# Patient Record
Sex: FEMALE | Race: WHITE | Employment: OTHER | URBAN - METROPOLITAN AREA
[De-identification: names, ages, dates, MRNs, and addresses within clinical notes are randomized per-mention and may not be internally consistent; named-entity substitution may affect disease eponyms.]

---

## 2019-09-29 ENCOUNTER — HOSPITAL ENCOUNTER (EMERGENCY)
Age: 79
Discharge: HOME | End: 2019-09-29
Attending: EMERGENCY MEDICINE
Payer: MEDICARE

## 2019-09-29 ENCOUNTER — APPOINTMENT (OUTPATIENT)
Dept: CT IMAGING | Age: 79
End: 2019-09-29
Attending: EMERGENCY MEDICINE
Payer: MEDICARE

## 2019-09-29 VITALS
HEIGHT: 66 IN | TEMPERATURE: 98.2 F | HEART RATE: 76 BPM | SYSTOLIC BLOOD PRESSURE: 144 MMHG | WEIGHT: 136 LBS | BODY MASS INDEX: 21.86 KG/M2 | DIASTOLIC BLOOD PRESSURE: 85 MMHG | OXYGEN SATURATION: 100 % | RESPIRATION RATE: 14 BRPM

## 2019-09-29 DIAGNOSIS — S00.83XA CONTUSION OF FACE, INITIAL ENCOUNTER: Primary | ICD-10-CM
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
## 2019-09-29 LAB
ATRIAL RATE: 277 BPM
CALCULATED R AXIS, ECG10: -6 DEGREES
CALCULATED T AXIS, ECG11: 33 DEGREES
DIAGNOSIS, 93000: NORMAL
Q-T INTERVAL, ECG07: 394 MS
QRS DURATION, ECG06: 76 MS
QTC CALCULATION (BEZET), ECG08: 460 MS
VENTRICULAR RATE, ECG03: 82 BPM

## 2019-09-29 PROCEDURE — 99283 EMERGENCY DEPT VISIT LOW MDM: CPT

## 2019-09-29 PROCEDURE — 93005 ELECTROCARDIOGRAM TRACING: CPT

## 2019-09-29 PROCEDURE — 70450 CT HEAD/BRAIN W/O DYE: CPT

## 2019-09-29 RX ORDER — FOLIC ACID 1 MG/1
TABLET ORAL DAILY
Status: ON HOLD | COMMUNITY
End: 2022-01-01 | Stop reason: CLARIF

## 2019-09-29 RX ORDER — FUROSEMIDE 20 MG/1
20 TABLET ORAL DAILY
Status: ON HOLD | COMMUNITY
End: 2022-01-01 | Stop reason: SDUPTHER

## 2019-09-29 RX ORDER — CLONAZEPAM 0.5 MG/1
0.5 TABLET ORAL
COMMUNITY

## 2019-09-29 RX ORDER — CLOPIDOGREL BISULFATE 75 MG/1
75 TABLET ORAL DAILY
COMMUNITY

## 2019-09-29 RX ORDER — ATORVASTATIN CALCIUM 80 MG/1
80 TABLET, FILM COATED ORAL
COMMUNITY

## 2019-09-29 RX ORDER — B-COMPLEX WITH VITAMIN C
1 TABLET ORAL DAILY
COMMUNITY
End: 2022-01-01

## 2019-09-29 RX ORDER — ESCITALOPRAM OXALATE 5 MG/1
15 TABLET ORAL DAILY
COMMUNITY

## 2019-09-29 RX ORDER — PANTOPRAZOLE SODIUM 40 MG/1
40 TABLET, DELAYED RELEASE ORAL DAILY
COMMUNITY

## 2019-09-29 NOTE — ED PROVIDER NOTES
53-year-old female history of atrial fibrillation and hypertension presents for evaluation of facial swelling post fall. Patient is here from [place] visiting her son. He heard a fall at around 0500 hrs. this morning. Found her sitting propped up beside the bed. She has bruising and whelped over the right side of the face adjacent to the right eye. No visual complaints at this time. Primary cause for concern is that the patient has chronic atrial fibrillation and is on anticoagulation for same. Past Medical History:   Diagnosis Date    A-fib [facility])     CAD (coronary artery disease)     Heart Failure    Hypertension     Osteoporosis        Past Surgical History:   Procedure Laterality Date    HX BACK SURGERY           History reviewed. No pertinent family history.     Social History     Socioeconomic History    Marital status:      Spouse name: Not on file    Number of children: Not on file    Years of education: Not on file    Highest education level: Not on file   Occupational History    Not on file   Social Needs    Financial resource strain: Not on file    Food insecurity:     Worry: Not on file     Inability: Not on file    Transportation needs:     Medical: Not on file     Non-medical: Not on file   Tobacco Use    Smoking status: Former Smoker    Smokeless tobacco: Never Used   Substance and Sexual Activity    Alcohol use: No    Drug use: No    Sexual activity: Not on file   Lifestyle    Physical activity:     Days per week: Not on file     Minutes per session: Not on file    Stress: Not on file   Relationships    Social connections:     Talks on phone: Not on file     Gets together: Not on file     Attends Buddhism service: Not on file     Active member of club or organization: Not on file     Attends meetings of clubs or organizations: Not on file     Relationship status: Not on file    Intimate partner violence:     Fear of current or ex partner: Not on file Emotionally abused: Not on file     Physically abused: Not on file     Forced sexual activity: Not on file   Other Topics Concern    Not on file   Social History Narrative    Not on file         ALLERGIES: Patient has no known allergies. Review of Systems   Musculoskeletal: Negative for neck pain. Neurological: Positive for headaches. Negative for syncope. All other systems reviewed and are negative. Vitals:    09/29/19 0926 09/29/19 0930 09/29/19 1121   BP:  145/89    Pulse:  86 82   Resp:  18    Temp: 98.2 °F (36.8 °C) 98.2 °F (36.8 °C)    SpO2:  100% 100%   Weight:  61.7 kg (136 lb)    Height:  5' 6\" (1.676 m)             Physical Exam   Constitutional: She is oriented to person, place, and time. She appears well-developed and well-nourished. No distress. HENT:   Head: Normocephalic. Mild soft tissue swelling noted adjacent to the right orbit. There is welt formation over this region. No bony deformity. Mild tenderness to palpation in the lateral orbital wall. Her pupils are midposition extraocular muscles are intact without evidence of entrapment. No epistaxis. No hemotympanum. No mandibular tenderness. Eyes: Pupils are equal, round, and reactive to light. EOM are normal. No scleral icterus. Neck:   No cervical spine tenderness. Cardiovascular: Normal rate. Pulmonary/Chest: Effort normal.   Musculoskeletal:   No thoracic or lumbar spine tenderness. No long bone deformities. No extremity bruising except for old appearing bruise over the right mid tibia. No associated point bony tenderness. Neurological: She is alert and oriented to person, place, and time. Skin: Skin is warm and dry. Psychiatric: She has a normal mood and affect. Nursing note and vitals reviewed. CT HEAD WO CONT   Final Result   IMPRESSION:       No acute intracranial abnormality. EKG per my interpretation at 0938 hrs. reveals atrial fibrillation moderate ventricular response, rate 82. Nonspecific mild ST-T wave changes without ST segment elevation. MDM  Number of Diagnoses or Management Options  Contusion of face, initial encounter:   Fall, initial encounter:   Diagnosis management comments: Impression: Evaluation post fall. Patient has facial contusion with negative CT for intracranial injury. Chronic A. fib. Procedures    Diagnosis:   1. Contusion of face, initial encounter    2. Fall, initial encounter      1. CT scan negative for acute brain injury. 2.  Apply ice to local swelling. 3.  Okay to take Tylenol for discomfort headache. 4.  Follow-up with primary care physician for recheck of any lingering symptoms in 7 to 10 days. 5.  Return to the emergency department for severe headache, acute change in mentation, vomiting, or acute change in vision. 6.  Continue current medications. Disposition: home    Follow-up Information     Follow up With Specialties Details Why Ronnie  EMERGENCY DEPT Emergency Medicine  As needed, If symptoms worsen [id number] [facility]  [phone]          Patient's Medications   Start Taking    No medications on file   Continue Taking    ASPIRIN DELAYED-RELEASE (ECOTRIN LOW STRENGTH) 81 MG TABLET    Take  by mouth daily. ATORVASTATIN (LIPITOR) 80 MG TABLET    Take 80 mg by mouth daily. B-COMPLEX WITH VITAMIN C TABLET    Take 1 Tab by mouth daily. CLONAZEPAM (KLONOPIN) 0.5 MG TABLET    Take  by mouth nightly as needed. CLOPIDOGREL (PLAVIX) 75 MG TAB    Take  by mouth. ESCITALOPRAM OXALATE (LEXAPRO) 5 MG TABLET    Take 15 mg by mouth daily. FAMOTIDINE (PEPCID) 20 MG TABLET    Take 20 mg by mouth two (2) times a day. FOLIC ACID (FOLVITE) 1 MG TABLET    Take  by mouth daily. FUROSEMIDE (LASIX) 80 MG TABLET    Take 80 mg by mouth. 1.5 tabs daily    HYDROMORPHONE (DILAUDID) 4 MG TABLET    Take  by mouth every three (3) hours as needed for Pain.     LISINOPRIL (PRINIVIL, ZESTRIL) 2.5 MG TABLET    Take 5 mg by mouth two (2) times a day. METOPROLOL (LOPRESSOR) 100 MG TABLET    Take 25 mg by mouth three (3) times daily. PANTOPRAZOLE (PROTONIX) 40 MG TABLET    Take 40 mg by mouth daily. TIZANIDINE (ZANAFLEX) 4 MG CAPSULE    Take 4 mg by mouth every twelve (12) hours as needed for Pain. These Medications have changed    No medications on file   Stop Taking    ALPRAZOLAM (XANAX) 0.5 MG TABLET    Take 0.5 mg by mouth. CALCIUM-CHOLECALCIFEROL, D3, 500 MG(1,250MG) -400 UNIT TAB    Take  by mouth.

## 2019-09-29 NOTE — ED TRIAGE NOTES
The pt was brought into the ER this morning by her son. He said around 5am this morning he heard a thump. He went in her room and found her on the floor. She stated that she fell on her face. He deyvi not know if there was LOC, or if she fell out of bed.

## 2022-01-01 ENCOUNTER — APPOINTMENT (OUTPATIENT)
Dept: CT IMAGING | Age: 82
DRG: 291 | End: 2022-01-01
Attending: STUDENT IN AN ORGANIZED HEALTH CARE EDUCATION/TRAINING PROGRAM
Payer: MEDICARE

## 2022-01-01 ENCOUNTER — APPOINTMENT (OUTPATIENT)
Dept: ULTRASOUND IMAGING | Age: 82
DRG: 291 | End: 2022-01-01
Attending: PHYSICIAN ASSISTANT
Payer: MEDICARE

## 2022-01-01 ENCOUNTER — APPOINTMENT (OUTPATIENT)
Dept: GENERAL RADIOLOGY | Age: 82
DRG: 291 | End: 2022-01-01
Attending: STUDENT IN AN ORGANIZED HEALTH CARE EDUCATION/TRAINING PROGRAM
Payer: MEDICARE

## 2022-01-01 ENCOUNTER — APPOINTMENT (OUTPATIENT)
Dept: GENERAL RADIOLOGY | Age: 82
DRG: 291 | End: 2022-01-01
Attending: INTERNAL MEDICINE
Payer: MEDICARE

## 2022-01-01 ENCOUNTER — APPOINTMENT (OUTPATIENT)
Dept: GENERAL RADIOLOGY | Age: 82
DRG: 291 | End: 2022-01-01
Attending: EMERGENCY MEDICINE
Payer: MEDICARE

## 2022-01-01 ENCOUNTER — APPOINTMENT (OUTPATIENT)
Dept: GENERAL RADIOLOGY | Age: 82
DRG: 291 | End: 2022-01-01
Payer: MEDICARE

## 2022-01-01 ENCOUNTER — APPOINTMENT (OUTPATIENT)
Dept: CT IMAGING | Age: 82
DRG: 291 | End: 2022-01-01
Attending: EMERGENCY MEDICINE
Payer: MEDICARE

## 2022-01-01 ENCOUNTER — ANESTHESIA EVENT (OUTPATIENT)
Dept: MEDSURG UNIT | Age: 82
DRG: 291 | End: 2022-01-01
Payer: MEDICARE

## 2022-01-01 ENCOUNTER — OFFICE VISIT (OUTPATIENT)
Dept: VASCULAR SURGERY | Age: 82
End: 2022-01-01
Payer: MEDICARE

## 2022-01-01 ENCOUNTER — HOSPITAL ENCOUNTER (INPATIENT)
Age: 82
LOS: 8 days | DRG: 291 | End: 2022-10-18
Attending: EMERGENCY MEDICINE | Admitting: INTERNAL MEDICINE
Payer: MEDICARE

## 2022-01-01 ENCOUNTER — APPOINTMENT (OUTPATIENT)
Dept: VASCULAR SURGERY | Age: 82
DRG: 291 | End: 2022-01-01
Attending: INTERNAL MEDICINE
Payer: MEDICARE

## 2022-01-01 ENCOUNTER — ANESTHESIA (OUTPATIENT)
Dept: MEDSURG UNIT | Age: 82
DRG: 291 | End: 2022-01-01
Payer: MEDICARE

## 2022-01-01 ENCOUNTER — APPOINTMENT (OUTPATIENT)
Dept: NON INVASIVE DIAGNOSTICS | Age: 82
DRG: 291 | End: 2022-01-01
Payer: MEDICARE

## 2022-01-01 VITALS
TEMPERATURE: 98.2 F | HEIGHT: 63 IN | OXYGEN SATURATION: 84 % | WEIGHT: 124.56 LBS | RESPIRATION RATE: 17 BRPM | BODY MASS INDEX: 22.07 KG/M2

## 2022-01-01 VITALS
WEIGHT: 111.99 LBS | BODY MASS INDEX: 19.84 KG/M2 | DIASTOLIC BLOOD PRESSURE: 80 MMHG | HEIGHT: 63 IN | HEART RATE: 66 BPM | SYSTOLIC BLOOD PRESSURE: 118 MMHG | OXYGEN SATURATION: 97 %

## 2022-01-01 DIAGNOSIS — R65.21 SEPTIC SHOCK (HCC): ICD-10-CM

## 2022-01-01 DIAGNOSIS — I50.43 ACUTE ON CHRONIC SYSTOLIC AND DIASTOLIC HEART FAILURE, NYHA CLASS 4 (HCC): ICD-10-CM

## 2022-01-01 DIAGNOSIS — I50.1 PULMONARY EDEMA CARDIAC CAUSE (HCC): ICD-10-CM

## 2022-01-01 DIAGNOSIS — J81.0 ACUTE PULMONARY EDEMA (HCC): ICD-10-CM

## 2022-01-01 DIAGNOSIS — R57.0 CARDIOGENIC SHOCK (HCC): ICD-10-CM

## 2022-01-01 DIAGNOSIS — I77.9 CAROTID ARTERY DISEASE, UNSPECIFIED LATERALITY, UNSPECIFIED TYPE (HCC): Primary | ICD-10-CM

## 2022-01-01 DIAGNOSIS — N17.9 AKI (ACUTE KIDNEY INJURY) (HCC): ICD-10-CM

## 2022-01-01 DIAGNOSIS — E87.70 HYPERVOLEMIA, UNSPECIFIED HYPERVOLEMIA TYPE: ICD-10-CM

## 2022-01-01 DIAGNOSIS — G93.40 ACUTE ENCEPHALOPATHY: ICD-10-CM

## 2022-01-01 DIAGNOSIS — E87.20 LACTIC ACIDOSIS: ICD-10-CM

## 2022-01-01 DIAGNOSIS — J96.22 ACUTE ON CHRONIC RESPIRATORY FAILURE WITH HYPOXIA AND HYPERCAPNIA (HCC): ICD-10-CM

## 2022-01-01 DIAGNOSIS — I48.0 PAROXYSMAL ATRIAL FIBRILLATION (HCC): ICD-10-CM

## 2022-01-01 DIAGNOSIS — J43.2 CENTRILOBULAR EMPHYSEMA (HCC): ICD-10-CM

## 2022-01-01 DIAGNOSIS — I50.23 ACUTE ON CHRONIC SYSTOLIC CONGESTIVE HEART FAILURE (HCC): ICD-10-CM

## 2022-01-01 DIAGNOSIS — J18.9 COMMUNITY ACQUIRED PNEUMONIA OF RIGHT LOWER LOBE OF LUNG: ICD-10-CM

## 2022-01-01 DIAGNOSIS — R53.81 DEBILITY: ICD-10-CM

## 2022-01-01 DIAGNOSIS — Z71.89 GOALS OF CARE, COUNSELING/DISCUSSION: ICD-10-CM

## 2022-01-01 DIAGNOSIS — R09.02 HYPOXIA: Primary | ICD-10-CM

## 2022-01-01 DIAGNOSIS — N30.01 ACUTE CYSTITIS WITH HEMATURIA: ICD-10-CM

## 2022-01-01 DIAGNOSIS — I48.91 ATRIAL FIBRILLATION WITH RVR (HCC): ICD-10-CM

## 2022-01-01 DIAGNOSIS — A41.9 SEPTIC SHOCK (HCC): ICD-10-CM

## 2022-01-01 DIAGNOSIS — J96.21 ACUTE ON CHRONIC RESPIRATORY FAILURE WITH HYPOXIA AND HYPERCAPNIA (HCC): ICD-10-CM

## 2022-01-01 LAB
ABO + RH BLD: NORMAL
ALBUMIN SERPL-MCNC: 2.3 G/DL (ref 3.4–5)
ALBUMIN SERPL-MCNC: 2.3 G/DL (ref 3.4–5)
ALBUMIN SERPL-MCNC: 2.5 G/DL (ref 3.4–5)
ALBUMIN SERPL-MCNC: 2.8 G/DL (ref 3.4–5)
ALBUMIN SERPL-MCNC: 2.9 G/DL (ref 3.4–5)
ALBUMIN SERPL-MCNC: 3 G/DL (ref 3.4–5)
ALBUMIN SERPL-MCNC: 3.1 G/DL (ref 3.4–5)
ALBUMIN SERPL-MCNC: 3.2 G/DL (ref 3.4–5)
ALBUMIN SERPL-MCNC: 3.3 G/DL (ref 3.4–5)
ALBUMIN SERPL-MCNC: 3.4 G/DL (ref 3.4–5)
ALBUMIN SERPL-MCNC: 3.4 G/DL (ref 3.4–5)
ALBUMIN/GLOB SERPL: 0.8 {RATIO} (ref 0.8–1.7)
ALBUMIN/GLOB SERPL: 1 {RATIO} (ref 0.8–1.7)
ALBUMIN/GLOB SERPL: 1 {RATIO} (ref 0.8–1.7)
ALBUMIN/GLOB SERPL: 1.1 {RATIO} (ref 0.8–1.7)
ALBUMIN/GLOB SERPL: 1.1 {RATIO} (ref 0.8–1.7)
ALP SERPL-CCNC: 134 U/L (ref 45–117)
ALP SERPL-CCNC: 306 U/L (ref 45–117)
ALP SERPL-CCNC: 306 U/L (ref 45–117)
ALP SERPL-CCNC: 370 U/L (ref 45–117)
ALP SERPL-CCNC: 99 U/L (ref 45–117)
ALT SERPL-CCNC: 249 U/L (ref 13–56)
ALT SERPL-CCNC: 251 U/L (ref 13–56)
ALT SERPL-CCNC: 265 U/L (ref 13–56)
ALT SERPL-CCNC: 287 U/L (ref 13–56)
ALT SERPL-CCNC: 34 U/L (ref 13–56)
AMORPH CRY URNS QL MICRO: ABNORMAL
ANION GAP BLD CALC-SCNC: 13 MMOL/L (ref 10–20)
ANION GAP BLD CALC-SCNC: 17 MMOL/L (ref 10–20)
ANION GAP SERPL CALC-SCNC: 10 MMOL/L (ref 3–18)
ANION GAP SERPL CALC-SCNC: 11 MMOL/L (ref 3–18)
ANION GAP SERPL CALC-SCNC: 12 MMOL/L (ref 3–18)
ANION GAP SERPL CALC-SCNC: 13 MMOL/L (ref 3–18)
ANION GAP SERPL CALC-SCNC: 14 MMOL/L (ref 3–18)
ANION GAP SERPL CALC-SCNC: 14 MMOL/L (ref 3–18)
ANION GAP SERPL CALC-SCNC: 17 MMOL/L (ref 3–18)
ANION GAP SERPL CALC-SCNC: 18 MMOL/L (ref 3–18)
ANION GAP SERPL CALC-SCNC: 5 MMOL/L (ref 3–18)
ANION GAP SERPL CALC-SCNC: 6 MMOL/L (ref 3–18)
ANION GAP SERPL CALC-SCNC: 7 MMOL/L (ref 3–18)
ANION GAP SERPL CALC-SCNC: 8 MMOL/L (ref 3–18)
ANION GAP SERPL CALC-SCNC: 9 MMOL/L (ref 3–18)
ANION GAP SERPL CALC-SCNC: 9 MMOL/L (ref 3–18)
APPEARANCE UR: ABNORMAL
APTT PPP: 103.3 SEC (ref 23–36.4)
APTT PPP: 111.4 SEC (ref 23–36.4)
APTT PPP: 120.1 SEC (ref 23–36.4)
APTT PPP: 146.4 SEC (ref 23–36.4)
APTT PPP: 29.9 SEC (ref 23–36.4)
APTT PPP: 38 SEC (ref 23–36.4)
ARTERIAL PATENCY WRIST A: ABNORMAL
ARTERIAL PATENCY WRIST A: NEGATIVE
ARTERIAL PATENCY WRIST A: NORMAL
ARTERIAL PATENCY WRIST A: POSITIVE
AST SERPL-CCNC: 302 U/L (ref 10–38)
AST SERPL-CCNC: 33 U/L (ref 10–38)
AST SERPL-CCNC: 390 U/L (ref 10–38)
AST SERPL-CCNC: 498 U/L (ref 10–38)
AST SERPL-CCNC: 559 U/L (ref 10–38)
ATRIAL RATE: 133 BPM
B PERT DNA SPEC QL NAA+PROBE: NOT DETECTED
BACTERIA SPEC CULT: NORMAL
BACTERIA URNS QL MICRO: ABNORMAL /HPF
BASE DEFICIT BLD-SCNC: 1.6 MMOL/L
BASE DEFICIT BLD-SCNC: 1.8 MMOL/L
BASE DEFICIT BLD-SCNC: 12.3 MMOL/L
BASE DEFICIT BLD-SCNC: 2 MMOL/L
BASE DEFICIT BLD-SCNC: 2.2 MMOL/L
BASE DEFICIT BLD-SCNC: 2.3 MMOL/L
BASE DEFICIT BLD-SCNC: 2.8 MMOL/L
BASE DEFICIT BLD-SCNC: 4 MMOL/L
BASE DEFICIT BLD-SCNC: 4.2 MMOL/L
BASE DEFICIT BLD-SCNC: 4.6 MMOL/L
BASE DEFICIT BLD-SCNC: 4.9 MMOL/L
BASE DEFICIT BLD-SCNC: 5 MMOL/L
BASE DEFICIT BLD-SCNC: 5.2 MMOL/L
BASE DEFICIT BLD-SCNC: 6.4 MMOL/L
BASE DEFICIT BLD-SCNC: 7 MMOL/L
BASOPHILS # BLD: 0 K/UL (ref 0–0.1)
BASOPHILS # BLD: 0.1 K/UL (ref 0–0.1)
BASOPHILS # BLD: 0.1 K/UL (ref 0–0.1)
BASOPHILS NFR BLD: 0 % (ref 0–2)
BASOPHILS NFR BLD: 1 % (ref 0–2)
BDY SITE: ABNORMAL
BDY SITE: NORMAL
BILIRUB DIRECT SERPL-MCNC: 0.7 MG/DL (ref 0–0.2)
BILIRUB SERPL-MCNC: 0.8 MG/DL (ref 0.2–1)
BILIRUB SERPL-MCNC: 1.4 MG/DL (ref 0.2–1)
BILIRUB SERPL-MCNC: 2 MG/DL (ref 0.2–1)
BILIRUB SERPL-MCNC: 2.4 MG/DL (ref 0.2–1)
BILIRUB SERPL-MCNC: 3.7 MG/DL (ref 0.2–1)
BILIRUB UR QL: NEGATIVE
BLD PROD TYP BPU: NORMAL
BLOOD GROUP ANTIBODIES SERPL: NORMAL
BNP SERPL-MCNC: ABNORMAL PG/ML (ref 0–1800)
BORDETELLA PARAPERTUSSIS PCR, BORPAR: NOT DETECTED
BPU ID: NORMAL
BUN SERPL-MCNC: 16 MG/DL (ref 7–18)
BUN SERPL-MCNC: 17 MG/DL (ref 7–18)
BUN SERPL-MCNC: 18 MG/DL (ref 7–18)
BUN SERPL-MCNC: 18 MG/DL (ref 7–18)
BUN SERPL-MCNC: 19 MG/DL (ref 7–18)
BUN SERPL-MCNC: 20 MG/DL (ref 7–18)
BUN SERPL-MCNC: 21 MG/DL (ref 7–18)
BUN SERPL-MCNC: 23 MG/DL (ref 7–18)
BUN SERPL-MCNC: 24 MG/DL (ref 7–18)
BUN SERPL-MCNC: 25 MG/DL (ref 7–18)
BUN SERPL-MCNC: 26 MG/DL (ref 7–18)
BUN SERPL-MCNC: 26 MG/DL (ref 7–18)
BUN SERPL-MCNC: 27 MG/DL (ref 7–18)
BUN SERPL-MCNC: 28 MG/DL (ref 7–18)
BUN SERPL-MCNC: 29 MG/DL (ref 7–18)
BUN SERPL-MCNC: 31 MG/DL (ref 7–18)
BUN SERPL-MCNC: 31 MG/DL (ref 7–18)
BUN SERPL-MCNC: 32 MG/DL (ref 7–18)
BUN SERPL-MCNC: 35 MG/DL (ref 7–18)
BUN SERPL-MCNC: 36 MG/DL (ref 7–18)
BUN/CREAT SERPL: 13 (ref 12–20)
BUN/CREAT SERPL: 14 (ref 12–20)
BUN/CREAT SERPL: 15 (ref 12–20)
BUN/CREAT SERPL: 16 (ref 12–20)
BUN/CREAT SERPL: 16 (ref 12–20)
BUN/CREAT SERPL: 17 (ref 12–20)
BUN/CREAT SERPL: 18 (ref 12–20)
BUN/CREAT SERPL: 18 (ref 12–20)
BUN/CREAT SERPL: 20 (ref 12–20)
BUN/CREAT SERPL: 20 (ref 12–20)
BUN/CREAT SERPL: 21 (ref 12–20)
BUN/CREAT SERPL: 22 (ref 12–20)
BUN/CREAT SERPL: 23 (ref 12–20)
BUN/CREAT SERPL: 24 (ref 12–20)
BUN/CREAT SERPL: 24 (ref 12–20)
BUN/CREAT SERPL: 25 (ref 12–20)
BUN/CREAT SERPL: 26 (ref 12–20)
C PNEUM DNA SPEC QL NAA+PROBE: NOT DETECTED
CA-I BLD-MCNC: 0.97 MMOL/L (ref 1.12–1.32)
CA-I BLD-MCNC: 1.06 MMOL/L (ref 1.12–1.32)
CA-I SERPL-SCNC: 0.95 MMOL/L (ref 1.15–1.33)
CA-I SERPL-SCNC: 1 MMOL/L (ref 1.15–1.33)
CA-I SERPL-SCNC: 1.02 MMOL/L (ref 1.15–1.33)
CA-I SERPL-SCNC: 1.03 MMOL/L (ref 1.15–1.33)
CA-I SERPL-SCNC: 1.04 MMOL/L (ref 1.15–1.33)
CA-I SERPL-SCNC: 1.05 MMOL/L (ref 1.15–1.33)
CA-I SERPL-SCNC: 1.06 MMOL/L (ref 1.15–1.33)
CA-I SERPL-SCNC: 1.07 MMOL/L (ref 1.15–1.33)
CA-I SERPL-SCNC: 1.08 MMOL/L (ref 1.15–1.33)
CA-I SERPL-SCNC: 1.08 MMOL/L (ref 1.15–1.33)
CA-I SERPL-SCNC: 1.1 MMOL/L (ref 1.15–1.33)
CA-I SERPL-SCNC: 1.11 MMOL/L (ref 1.15–1.33)
CA-I SERPL-SCNC: 1.11 MMOL/L (ref 1.15–1.33)
CA-I SERPL-SCNC: 1.12 MMOL/L (ref 1.15–1.33)
CA-I SERPL-SCNC: 1.12 MMOL/L (ref 1.15–1.33)
CA-I SERPL-SCNC: 1.13 MMOL/L (ref 1.15–1.33)
CA-I SERPL-SCNC: 1.14 MMOL/L (ref 1.15–1.33)
CA-I SERPL-SCNC: 1.17 MMOL/L (ref 1.15–1.33)
CA-I SERPL-SCNC: 1.18 MMOL/L (ref 1.15–1.33)
CA-I SERPL-SCNC: 1.19 MMOL/L (ref 1.15–1.33)
CA-I SERPL-SCNC: 1.21 MMOL/L (ref 1.15–1.33)
CA-I SERPL-SCNC: 1.23 MMOL/L (ref 1.15–1.33)
CA-I SERPL-SCNC: 1.25 MMOL/L (ref 1.15–1.33)
CA-I SERPL-SCNC: 1.35 MMOL/L (ref 1.15–1.33)
CALCIUM SERPL-MCNC: 7.4 MG/DL (ref 8.5–10.1)
CALCIUM SERPL-MCNC: 7.6 MG/DL (ref 8.5–10.1)
CALCIUM SERPL-MCNC: 7.8 MG/DL (ref 8.5–10.1)
CALCIUM SERPL-MCNC: 8 MG/DL (ref 8.5–10.1)
CALCIUM SERPL-MCNC: 8 MG/DL (ref 8.5–10.1)
CALCIUM SERPL-MCNC: 8.1 MG/DL (ref 8.5–10.1)
CALCIUM SERPL-MCNC: 8.2 MG/DL (ref 8.5–10.1)
CALCIUM SERPL-MCNC: 8.3 MG/DL (ref 8.5–10.1)
CALCIUM SERPL-MCNC: 8.4 MG/DL (ref 8.5–10.1)
CALCIUM SERPL-MCNC: 8.5 MG/DL (ref 8.5–10.1)
CALCIUM SERPL-MCNC: 8.6 MG/DL (ref 8.5–10.1)
CALCIUM SERPL-MCNC: 8.7 MG/DL (ref 8.5–10.1)
CALCIUM SERPL-MCNC: 8.8 MG/DL (ref 8.5–10.1)
CALCIUM SERPL-MCNC: 9.5 MG/DL (ref 8.5–10.1)
CALCIUM SERPL-MCNC: 9.8 MG/DL (ref 8.5–10.1)
CALCULATED R AXIS, ECG10: -15 DEGREES
CALCULATED R AXIS, ECG10: -25 DEGREES
CALCULATED T AXIS, ECG11: -102 DEGREES
CALCULATED T AXIS, ECG11: 119 DEGREES
CALLED TO:,BCALL1: NORMAL
CHLORIDE BLD-SCNC: 108 MMOL/L (ref 98–107)
CHLORIDE BLD-SCNC: 109 MMOL/L (ref 98–107)
CHLORIDE SERPL-SCNC: 101 MMOL/L (ref 100–111)
CHLORIDE SERPL-SCNC: 103 MMOL/L (ref 100–111)
CHLORIDE SERPL-SCNC: 104 MMOL/L (ref 100–111)
CHLORIDE SERPL-SCNC: 105 MMOL/L (ref 100–111)
CHLORIDE SERPL-SCNC: 106 MMOL/L (ref 100–111)
CHLORIDE SERPL-SCNC: 106 MMOL/L (ref 100–111)
CHLORIDE SERPL-SCNC: 107 MMOL/L (ref 100–111)
CHLORIDE SERPL-SCNC: 108 MMOL/L (ref 100–111)
CHLORIDE SERPL-SCNC: 109 MMOL/L (ref 100–111)
CHLORIDE SERPL-SCNC: 109 MMOL/L (ref 100–111)
CO2 BLD-SCNC: 17 MMOL/L (ref 19–24)
CO2 BLD-SCNC: 21 MMOL/L (ref 19–24)
CO2 SERPL-SCNC: 17 MMOL/L (ref 21–32)
CO2 SERPL-SCNC: 18 MMOL/L (ref 21–32)
CO2 SERPL-SCNC: 19 MMOL/L (ref 21–32)
CO2 SERPL-SCNC: 19 MMOL/L (ref 21–32)
CO2 SERPL-SCNC: 20 MMOL/L (ref 21–32)
CO2 SERPL-SCNC: 21 MMOL/L (ref 21–32)
CO2 SERPL-SCNC: 22 MMOL/L (ref 21–32)
CO2 SERPL-SCNC: 23 MMOL/L (ref 21–32)
CO2 SERPL-SCNC: 24 MMOL/L (ref 21–32)
CO2 SERPL-SCNC: 25 MMOL/L (ref 21–32)
CO2 SERPL-SCNC: 25 MMOL/L (ref 21–32)
CO2 SERPL-SCNC: 26 MMOL/L (ref 21–32)
COLOR UR: YELLOW
CREAT BLD-MCNC: 1.27 MG/DL (ref 0.6–1.3)
CREAT BLD-MCNC: 1.35 MG/DL (ref 0.6–1.3)
CREAT SERPL-MCNC: 1.14 MG/DL (ref 0.6–1.3)
CREAT SERPL-MCNC: 1.14 MG/DL (ref 0.6–1.3)
CREAT SERPL-MCNC: 1.15 MG/DL (ref 0.6–1.3)
CREAT SERPL-MCNC: 1.16 MG/DL (ref 0.6–1.3)
CREAT SERPL-MCNC: 1.16 MG/DL (ref 0.6–1.3)
CREAT SERPL-MCNC: 1.18 MG/DL (ref 0.6–1.3)
CREAT SERPL-MCNC: 1.19 MG/DL (ref 0.6–1.3)
CREAT SERPL-MCNC: 1.23 MG/DL (ref 0.6–1.3)
CREAT SERPL-MCNC: 1.24 MG/DL (ref 0.6–1.3)
CREAT SERPL-MCNC: 1.25 MG/DL (ref 0.6–1.3)
CREAT SERPL-MCNC: 1.26 MG/DL (ref 0.6–1.3)
CREAT SERPL-MCNC: 1.27 MG/DL (ref 0.6–1.3)
CREAT SERPL-MCNC: 1.27 MG/DL (ref 0.6–1.3)
CREAT SERPL-MCNC: 1.28 MG/DL (ref 0.6–1.3)
CREAT SERPL-MCNC: 1.29 MG/DL (ref 0.6–1.3)
CREAT SERPL-MCNC: 1.3 MG/DL (ref 0.6–1.3)
CREAT SERPL-MCNC: 1.3 MG/DL (ref 0.6–1.3)
CREAT SERPL-MCNC: 1.31 MG/DL (ref 0.6–1.3)
CREAT SERPL-MCNC: 1.32 MG/DL (ref 0.6–1.3)
CREAT SERPL-MCNC: 1.35 MG/DL (ref 0.6–1.3)
CREAT SERPL-MCNC: 1.36 MG/DL (ref 0.6–1.3)
CREAT SERPL-MCNC: 1.36 MG/DL (ref 0.6–1.3)
CREAT SERPL-MCNC: 1.37 MG/DL (ref 0.6–1.3)
CREAT SERPL-MCNC: 1.4 MG/DL (ref 0.6–1.3)
CREAT SERPL-MCNC: 1.42 MG/DL (ref 0.6–1.3)
CREAT SERPL-MCNC: 1.42 MG/DL (ref 0.6–1.3)
CREAT SERPL-MCNC: 1.46 MG/DL (ref 0.6–1.3)
CREAT SERPL-MCNC: 1.47 MG/DL (ref 0.6–1.3)
CREAT SERPL-MCNC: 1.48 MG/DL (ref 0.6–1.3)
CREAT SERPL-MCNC: 1.49 MG/DL (ref 0.6–1.3)
CREAT SERPL-MCNC: 1.56 MG/DL (ref 0.6–1.3)
CROSSMATCH RESULT,%XM: NORMAL
D DIMER PPP FEU-MCNC: 6.59 UG/ML(FEU)
DIAGNOSIS, 93000: NORMAL
DIAGNOSIS, 93000: NORMAL
DIFFERENTIAL METHOD BLD: ABNORMAL
ECHO LV FRACTIONAL SHORTENING: 10 % (ref 28–44)
ECHO LV INTERNAL DIMENSION DIASTOLE INDEX: 2.59 CM/M2
ECHO LV INTERNAL DIMENSION DIASTOLIC: 4.1 CM (ref 3.9–5.3)
ECHO LV INTERNAL DIMENSION SYSTOLIC INDEX: 2.34 CM/M2
ECHO LV INTERNAL DIMENSION SYSTOLIC: 3.7 CM
ECHO LV IVSD: 1.1 CM (ref 0.6–0.9)
ECHO LV MASS 2D: 151.3 G (ref 67–162)
ECHO LV MASS INDEX 2D: 95.8 G/M2 (ref 43–95)
ECHO LV POSTERIOR WALL DIASTOLIC: 1.1 CM (ref 0.6–0.9)
ECHO LV RELATIVE WALL THICKNESS RATIO: 0.54
ECHO PULMONARY ARTERY SYSTOLIC PRESSURE (PASP): 53 MMHG
ECHO RV FREE WALL PEAK S': 5 CM/S
ECHO RV TAPSE: 1.3 CM (ref 1.7–?)
ECHO TV REGURGITANT MAX VELOCITY: 3.37 M/S
ECHO TV REGURGITANT PEAK GRADIENT: 45 MMHG
EOSINOPHIL # BLD: 0 K/UL (ref 0–0.4)
EOSINOPHIL # BLD: 0.1 K/UL (ref 0–0.4)
EOSINOPHIL # BLD: 0.1 K/UL (ref 0–0.4)
EOSINOPHIL NFR BLD: 0 % (ref 0–5)
EOSINOPHIL NFR BLD: 1 % (ref 0–5)
EPITH CASTS URNS QL MICRO: ABNORMAL /LPF (ref 0–5)
ERYTHROCYTE [DISTWIDTH] IN BLOOD BY AUTOMATED COUNT: 21 % (ref 11.6–14.5)
ERYTHROCYTE [DISTWIDTH] IN BLOOD BY AUTOMATED COUNT: 21.2 % (ref 11.6–14.5)
ERYTHROCYTE [DISTWIDTH] IN BLOOD BY AUTOMATED COUNT: 21.5 % (ref 11.6–14.5)
ERYTHROCYTE [DISTWIDTH] IN BLOOD BY AUTOMATED COUNT: 21.6 % (ref 11.6–14.5)
ERYTHROCYTE [DISTWIDTH] IN BLOOD BY AUTOMATED COUNT: 21.7 % (ref 11.6–14.5)
ERYTHROCYTE [DISTWIDTH] IN BLOOD BY AUTOMATED COUNT: 21.7 % (ref 11.6–14.5)
ERYTHROCYTE [DISTWIDTH] IN BLOOD BY AUTOMATED COUNT: 22.5 % (ref 11.6–14.5)
ERYTHROCYTE [DISTWIDTH] IN BLOOD BY AUTOMATED COUNT: 24.8 % (ref 11.6–14.5)
EST. AVERAGE GLUCOSE BLD GHB EST-MCNC: 114 MG/DL
FDP BLD QL AGGL: NORMAL UG/ML
FIBRINOGEN PPP-MCNC: 548 MG/DL (ref 210–451)
FIO2 ON VENT: 80 %
FLUAV RNA SPEC QL NAA+PROBE: NOT DETECTED
FLUAV SUBTYP SPEC NAA+PROBE: NOT DETECTED
FLUBV RNA SPEC QL NAA+PROBE: NOT DETECTED
FLUBV RNA SPEC QL NAA+PROBE: NOT DETECTED
GAS FLOW.O2 O2 DELIVERY SYS: ABNORMAL L/MIN
GAS FLOW.O2 O2 DELIVERY SYS: NORMAL L/MIN
GAS FLOW.O2 SETTING OXYMISER: 10 BPM
GAS FLOW.O2 SETTING OXYMISER: 15 BPM
GAS FLOW.O2 SETTING OXYMISER: 15 BPM
GAS FLOW.O2 SETTING OXYMISER: 18 BPM
GAS FLOW.O2 SETTING OXYMISER: 22 BPM
GAS FLOW.O2 SETTING OXYMISER: 24 BPM
GAS FLOW.O2 SETTING OXYMISER: 26 BPM
GAS FLOW.O2 SETTING OXYMISER: 8 BPM
GLOBULIN SER CALC-MCNC: 2.8 G/DL (ref 2–4)
GLOBULIN SER CALC-MCNC: 2.8 G/DL (ref 2–4)
GLOBULIN SER CALC-MCNC: 2.9 G/DL (ref 2–4)
GLOBULIN SER CALC-MCNC: 3.1 G/DL (ref 2–4)
GLOBULIN SER CALC-MCNC: 3.3 G/DL (ref 2–4)
GLUCOSE BLD STRIP.AUTO-MCNC: 108 MG/DL (ref 70–110)
GLUCOSE BLD STRIP.AUTO-MCNC: 109 MG/DL (ref 70–110)
GLUCOSE BLD STRIP.AUTO-MCNC: 110 MG/DL (ref 70–110)
GLUCOSE BLD STRIP.AUTO-MCNC: 129 MG/DL (ref 70–110)
GLUCOSE BLD STRIP.AUTO-MCNC: 138 MG/DL (ref 70–110)
GLUCOSE BLD STRIP.AUTO-MCNC: 142 MG/DL (ref 70–110)
GLUCOSE BLD STRIP.AUTO-MCNC: 150 MG/DL (ref 70–110)
GLUCOSE BLD STRIP.AUTO-MCNC: 151 MG/DL (ref 70–110)
GLUCOSE BLD STRIP.AUTO-MCNC: 153 MG/DL (ref 70–110)
GLUCOSE BLD STRIP.AUTO-MCNC: 155 MG/DL (ref 70–110)
GLUCOSE BLD STRIP.AUTO-MCNC: 155 MG/DL (ref 70–110)
GLUCOSE BLD STRIP.AUTO-MCNC: 157 MG/DL (ref 70–110)
GLUCOSE BLD STRIP.AUTO-MCNC: 158 MG/DL (ref 70–110)
GLUCOSE BLD STRIP.AUTO-MCNC: 159 MG/DL (ref 70–110)
GLUCOSE BLD STRIP.AUTO-MCNC: 161 MG/DL (ref 70–110)
GLUCOSE BLD STRIP.AUTO-MCNC: 164 MG/DL (ref 70–110)
GLUCOSE BLD STRIP.AUTO-MCNC: 169 MG/DL (ref 70–110)
GLUCOSE BLD STRIP.AUTO-MCNC: 173 MG/DL (ref 70–110)
GLUCOSE BLD STRIP.AUTO-MCNC: 186 MG/DL (ref 70–110)
GLUCOSE BLD STRIP.AUTO-MCNC: 191 MG/DL (ref 70–110)
GLUCOSE BLD STRIP.AUTO-MCNC: 197 MG/DL (ref 70–110)
GLUCOSE BLD STRIP.AUTO-MCNC: 223 MG/DL (ref 70–110)
GLUCOSE BLD STRIP.AUTO-MCNC: 44 MG/DL (ref 70–110)
GLUCOSE BLD STRIP.AUTO-MCNC: 55 MG/DL (ref 70–110)
GLUCOSE BLD STRIP.AUTO-MCNC: 79 MG/DL (ref 70–110)
GLUCOSE BLD STRIP.AUTO-MCNC: 87 MG/DL (ref 70–110)
GLUCOSE BLD-MCNC: 161 MG/DL (ref 65–100)
GLUCOSE BLD-MCNC: 181 MG/DL (ref 65–100)
GLUCOSE SERPL-MCNC: 100 MG/DL (ref 74–99)
GLUCOSE SERPL-MCNC: 112 MG/DL (ref 74–99)
GLUCOSE SERPL-MCNC: 123 MG/DL (ref 74–99)
GLUCOSE SERPL-MCNC: 130 MG/DL (ref 74–99)
GLUCOSE SERPL-MCNC: 133 MG/DL (ref 74–99)
GLUCOSE SERPL-MCNC: 133 MG/DL (ref 74–99)
GLUCOSE SERPL-MCNC: 137 MG/DL (ref 74–99)
GLUCOSE SERPL-MCNC: 140 MG/DL (ref 74–99)
GLUCOSE SERPL-MCNC: 146 MG/DL (ref 74–99)
GLUCOSE SERPL-MCNC: 146 MG/DL (ref 74–99)
GLUCOSE SERPL-MCNC: 149 MG/DL (ref 74–99)
GLUCOSE SERPL-MCNC: 150 MG/DL (ref 74–99)
GLUCOSE SERPL-MCNC: 151 MG/DL (ref 74–99)
GLUCOSE SERPL-MCNC: 155 MG/DL (ref 74–99)
GLUCOSE SERPL-MCNC: 155 MG/DL (ref 74–99)
GLUCOSE SERPL-MCNC: 159 MG/DL (ref 74–99)
GLUCOSE SERPL-MCNC: 163 MG/DL (ref 74–99)
GLUCOSE SERPL-MCNC: 165 MG/DL (ref 74–99)
GLUCOSE SERPL-MCNC: 170 MG/DL (ref 74–99)
GLUCOSE SERPL-MCNC: 171 MG/DL (ref 74–99)
GLUCOSE SERPL-MCNC: 174 MG/DL (ref 74–99)
GLUCOSE SERPL-MCNC: 182 MG/DL (ref 74–99)
GLUCOSE SERPL-MCNC: 182 MG/DL (ref 74–99)
GLUCOSE SERPL-MCNC: 188 MG/DL (ref 74–99)
GLUCOSE SERPL-MCNC: 189 MG/DL (ref 74–99)
GLUCOSE SERPL-MCNC: 194 MG/DL (ref 74–99)
GLUCOSE SERPL-MCNC: 196 MG/DL (ref 74–99)
GLUCOSE SERPL-MCNC: 202 MG/DL (ref 74–99)
GLUCOSE SERPL-MCNC: 225 MG/DL (ref 74–99)
GLUCOSE SERPL-MCNC: 51 MG/DL (ref 74–99)
GLUCOSE SERPL-MCNC: 71 MG/DL (ref 74–99)
GLUCOSE SERPL-MCNC: 91 MG/DL (ref 74–99)
GLUCOSE SERPL-MCNC: 96 MG/DL (ref 74–99)
GLUCOSE UR STRIP.AUTO-MCNC: NEGATIVE MG/DL
GRAM STN SPEC: NORMAL
HADV DNA SPEC QL NAA+PROBE: NOT DETECTED
HBA1C MFR BLD: 5.6 % (ref 4.2–5.6)
HBV SURFACE AB SER QL IA: NEGATIVE
HBV SURFACE AB SERPL IA-ACNC: <3.1 MIU/ML
HBV SURFACE AG SER QL: <0.1 INDEX
HBV SURFACE AG SER QL: NEGATIVE
HCO3 BLD-SCNC: 15.3 MMOL/L (ref 22–26)
HCO3 BLD-SCNC: 17 MMOL/L (ref 22–26)
HCO3 BLD-SCNC: 17.4 MMOL/L (ref 22–26)
HCO3 BLD-SCNC: 17.5 MMOL/L (ref 22–26)
HCO3 BLD-SCNC: 18.3 MMOL/L (ref 22–26)
HCO3 BLD-SCNC: 19.4 MMOL/L (ref 22–26)
HCO3 BLD-SCNC: 19.5 MMOL/L (ref 22–26)
HCO3 BLD-SCNC: 19.9 MMOL/L (ref 22–26)
HCO3 BLD-SCNC: 20 MMOL/L (ref 22–26)
HCO3 BLD-SCNC: 20.7 MMOL/L (ref 22–26)
HCO3 BLD-SCNC: 20.8 MMOL/L (ref 22–26)
HCO3 BLD-SCNC: 22.2 MMOL/L (ref 22–26)
HCO3 BLD-SCNC: 23 MMOL/L (ref 22–26)
HCO3 BLD-SCNC: 23.3 MMOL/L (ref 22–26)
HCO3 BLD-SCNC: 23.6 MMOL/L (ref 22–26)
HCOV 229E RNA SPEC QL NAA+PROBE: NOT DETECTED
HCOV HKU1 RNA SPEC QL NAA+PROBE: NOT DETECTED
HCOV NL63 RNA SPEC QL NAA+PROBE: NOT DETECTED
HCOV OC43 RNA SPEC QL NAA+PROBE: NOT DETECTED
HCT VFR BLD AUTO: 19.4 % (ref 35–45)
HCT VFR BLD AUTO: 21.5 % (ref 35–45)
HCT VFR BLD AUTO: 23.1 % (ref 35–45)
HCT VFR BLD AUTO: 23.3 % (ref 35–45)
HCT VFR BLD AUTO: 23.5 % (ref 35–45)
HCT VFR BLD AUTO: 24 % (ref 35–45)
HCT VFR BLD AUTO: 24.6 % (ref 35–45)
HCT VFR BLD AUTO: 25.1 % (ref 35–45)
HCT VFR BLD AUTO: 27 % (ref 35–45)
HCT VFR BLD AUTO: 28 % (ref 35–45)
HCT VFR BLD AUTO: 29.5 % (ref 35–45)
HCT VFR BLD AUTO: 29.6 % (ref 35–45)
HCT VFR BLD AUTO: 30.4 % (ref 35–45)
HCT VFR BLD AUTO: 33.4 % (ref 35–45)
HCT VFR BLD AUTO: 34.3 % (ref 35–45)
HEP BS AB COMMENT,HBSAC: ABNORMAL
HEPARIN AGGREGATION,XHEAGT: NEGATIVE
HGB BLD-MCNC: 11 G/DL (ref 12–16)
HGB BLD-MCNC: 11 G/DL (ref 12–16)
HGB BLD-MCNC: 6.3 G/DL (ref 12–16)
HGB BLD-MCNC: 7.2 G/DL (ref 12–16)
HGB BLD-MCNC: 7.3 G/DL (ref 12–16)
HGB BLD-MCNC: 7.5 G/DL (ref 12–16)
HGB BLD-MCNC: 7.7 G/DL (ref 12–16)
HGB BLD-MCNC: 7.9 G/DL (ref 12–16)
HGB BLD-MCNC: 8.5 G/DL (ref 12–16)
HGB BLD-MCNC: 8.8 G/DL (ref 12–16)
HGB BLD-MCNC: 9.1 G/DL (ref 12–16)
HGB BLD-MCNC: 9.6 G/DL (ref 12–16)
HGB BLD-MCNC: 9.6 G/DL (ref 12–16)
HGB UR QL STRIP: NEGATIVE
HISTORY CHECKED?,CKHIST: NORMAL
HMPV RNA SPEC QL NAA+PROBE: NOT DETECTED
HPIV1 RNA SPEC QL NAA+PROBE: NOT DETECTED
HPIV2 RNA SPEC QL NAA+PROBE: NOT DETECTED
HPIV3 RNA SPEC QL NAA+PROBE: NOT DETECTED
HPIV4 RNA SPEC QL NAA+PROBE: NOT DETECTED
IMM GRANULOCYTES # BLD AUTO: 0 K/UL
IMM GRANULOCYTES # BLD AUTO: 0 K/UL
IMM GRANULOCYTES # BLD AUTO: 0 K/UL (ref 0–0.04)
IMM GRANULOCYTES # BLD AUTO: 0.1 K/UL (ref 0–0.04)
IMM GRANULOCYTES # BLD AUTO: 0.2 K/UL (ref 0–0.04)
IMM GRANULOCYTES NFR BLD AUTO: 0 %
IMM GRANULOCYTES NFR BLD AUTO: 0 %
IMM GRANULOCYTES NFR BLD AUTO: 0 % (ref 0–0.5)
IMM GRANULOCYTES NFR BLD AUTO: 1 % (ref 0–0.5)
INR PPP: 1.1 (ref 0.8–1.2)
INR PPP: 1.2 (ref 0.8–1.2)
INR PPP: 1.8 (ref 0.8–1.2)
INSPIRATION.DURATION SETTING TIME VENT: 0.65 SEC
INSPIRATION.DURATION SETTING TIME VENT: 0.65 SEC
KETONES UR QL STRIP.AUTO: NEGATIVE MG/DL
L PNEUMO AG UR QL IA: NEGATIVE
LACTATE BLD-SCNC: 2.13 MMOL/L (ref 0.4–2)
LACTATE BLD-SCNC: 2.24 MMOL/L (ref 0.4–2)
LACTATE BLD-SCNC: 3.48 MMOL/L (ref 0.4–2)
LACTATE BLD-SCNC: 3.67 MMOL/L (ref 0.4–2)
LACTATE BLD-SCNC: 4.43 MMOL/L (ref 0.4–2)
LACTATE SERPL-SCNC: 1.4 MMOL/L (ref 0.4–2)
LACTATE SERPL-SCNC: 1.6 MMOL/L (ref 0.4–2)
LACTATE SERPL-SCNC: 1.7 MMOL/L (ref 0.4–2)
LACTATE SERPL-SCNC: 1.8 MMOL/L (ref 0.4–2)
LACTATE SERPL-SCNC: 1.8 MMOL/L (ref 0.4–2)
LACTATE SERPL-SCNC: 11.6 MMOL/L (ref 0.4–2)
LACTATE SERPL-SCNC: 2 MMOL/L (ref 0.4–2)
LACTATE SERPL-SCNC: 2 MMOL/L (ref 0.4–2)
LACTATE SERPL-SCNC: 2.1 MMOL/L (ref 0.4–2)
LACTATE SERPL-SCNC: 2.1 MMOL/L (ref 0.4–2)
LACTATE SERPL-SCNC: 2.4 MMOL/L (ref 0.4–2)
LACTATE SERPL-SCNC: 2.4 MMOL/L (ref 0.4–2)
LACTATE SERPL-SCNC: 2.7 MMOL/L (ref 0.4–2)
LACTATE SERPL-SCNC: 2.8 MMOL/L (ref 0.4–2)
LACTATE SERPL-SCNC: 2.9 MMOL/L (ref 0.4–2)
LACTATE SERPL-SCNC: 3 MMOL/L (ref 0.4–2)
LACTATE SERPL-SCNC: 3.3 MMOL/L (ref 0.4–2)
LACTATE SERPL-SCNC: 3.5 MMOL/L (ref 0.4–2)
LACTATE SERPL-SCNC: 3.9 MMOL/L (ref 0.4–2)
LACTATE SERPL-SCNC: 3.9 MMOL/L (ref 0.4–2)
LACTATE SERPL-SCNC: 4.1 MMOL/L (ref 0.4–2)
LACTATE SERPL-SCNC: 4.2 MMOL/L (ref 0.4–2)
LACTATE SERPL-SCNC: 4.7 MMOL/L (ref 0.4–2)
LACTATE SERPL-SCNC: 5 MMOL/L (ref 0.4–2)
LACTATE SERPL-SCNC: 5.5 MMOL/L (ref 0.4–2)
LACTATE SERPL-SCNC: 5.6 MMOL/L (ref 0.4–2)
LACTATE SERPL-SCNC: 8 MMOL/L (ref 0.4–2)
LEUKOCYTE ESTERASE UR QL STRIP.AUTO: ABNORMAL
LYMPHOCYTES # BLD: 0.2 K/UL (ref 0.9–3.6)
LYMPHOCYTES # BLD: 0.4 K/UL (ref 0.9–3.6)
LYMPHOCYTES # BLD: 0.6 K/UL (ref 0.9–3.6)
LYMPHOCYTES # BLD: 0.9 K/UL (ref 0.9–3.6)
LYMPHOCYTES # BLD: 1.4 K/UL (ref 0.9–3.6)
LYMPHOCYTES # BLD: 1.5 K/UL (ref 0.9–3.6)
LYMPHOCYTES # BLD: 1.7 K/UL (ref 0.9–3.6)
LYMPHOCYTES NFR BLD: 1 % (ref 21–52)
LYMPHOCYTES NFR BLD: 10 % (ref 21–52)
LYMPHOCYTES NFR BLD: 16 % (ref 21–52)
LYMPHOCYTES NFR BLD: 2 % (ref 21–52)
LYMPHOCYTES NFR BLD: 3 % (ref 21–52)
LYMPHOCYTES NFR BLD: 3 % (ref 21–52)
LYMPHOCYTES NFR BLD: 4 % (ref 21–52)
LYMPHOCYTES NFR BLD: 5 % (ref 21–52)
LYMPHOCYTES NFR BLD: 8 % (ref 21–52)
LYMPHOCYTES NFR BLD: 9 % (ref 21–52)
LYMPHOCYTES NFR BLD: 9 % (ref 21–52)
M PNEUMO DNA SPEC QL NAA+PROBE: NOT DETECTED
MAGNESIUM SERPL-MCNC: 1.8 MG/DL (ref 1.6–2.6)
MAGNESIUM SERPL-MCNC: 1.8 MG/DL (ref 1.6–2.6)
MAGNESIUM SERPL-MCNC: 2.2 MG/DL (ref 1.6–2.6)
MAGNESIUM SERPL-MCNC: 2.2 MG/DL (ref 1.6–2.6)
MAGNESIUM SERPL-MCNC: 2.3 MG/DL (ref 1.6–2.6)
MAGNESIUM SERPL-MCNC: 2.4 MG/DL (ref 1.6–2.6)
MAGNESIUM SERPL-MCNC: 2.5 MG/DL (ref 1.6–2.6)
MAGNESIUM SERPL-MCNC: 2.6 MG/DL (ref 1.6–2.6)
MAGNESIUM SERPL-MCNC: 2.7 MG/DL (ref 1.6–2.6)
MAGNESIUM SERPL-MCNC: 2.7 MG/DL (ref 1.6–2.6)
MAGNESIUM SERPL-MCNC: 2.8 MG/DL (ref 1.6–2.6)
MAGNESIUM SERPL-MCNC: 2.9 MG/DL (ref 1.6–2.6)
MCH RBC QN AUTO: 31.2 PG (ref 24–34)
MCH RBC QN AUTO: 31.3 PG (ref 24–34)
MCH RBC QN AUTO: 31.3 PG (ref 24–34)
MCH RBC QN AUTO: 32.1 PG (ref 24–34)
MCH RBC QN AUTO: 32.3 PG (ref 24–34)
MCH RBC QN AUTO: 32.7 PG (ref 24–34)
MCH RBC QN AUTO: 32.8 PG (ref 24–34)
MCH RBC QN AUTO: 32.8 PG (ref 24–34)
MCH RBC QN AUTO: 32.9 PG (ref 24–34)
MCH RBC QN AUTO: 32.9 PG (ref 24–34)
MCH RBC QN AUTO: 33 PG (ref 24–34)
MCH RBC QN AUTO: 33.1 PG (ref 24–34)
MCH RBC QN AUTO: 33.2 PG (ref 24–34)
MCHC RBC AUTO-ENTMCNC: 30.7 G/DL (ref 31–37)
MCHC RBC AUTO-ENTMCNC: 30.8 G/DL (ref 31–37)
MCHC RBC AUTO-ENTMCNC: 31.1 G/DL (ref 31–37)
MCHC RBC AUTO-ENTMCNC: 31.3 G/DL (ref 31–37)
MCHC RBC AUTO-ENTMCNC: 31.4 G/DL (ref 31–37)
MCHC RBC AUTO-ENTMCNC: 31.5 G/DL (ref 31–37)
MCHC RBC AUTO-ENTMCNC: 31.6 G/DL (ref 31–37)
MCHC RBC AUTO-ENTMCNC: 32.1 G/DL (ref 31–37)
MCHC RBC AUTO-ENTMCNC: 32.4 G/DL (ref 31–37)
MCHC RBC AUTO-ENTMCNC: 32.5 G/DL (ref 31–37)
MCHC RBC AUTO-ENTMCNC: 32.9 G/DL (ref 31–37)
MCHC RBC AUTO-ENTMCNC: 33 G/DL (ref 31–37)
MCHC RBC AUTO-ENTMCNC: 33.5 G/DL (ref 31–37)
MCV RBC AUTO: 100 FL (ref 78–100)
MCV RBC AUTO: 102.4 FL (ref 78–100)
MCV RBC AUTO: 102.7 FL (ref 78–100)
MCV RBC AUTO: 104 FL (ref 78–100)
MCV RBC AUTO: 104.5 FL (ref 78–100)
MCV RBC AUTO: 104.5 FL (ref 78–100)
MCV RBC AUTO: 104.6 FL (ref 78–100)
MCV RBC AUTO: 104.7 FL (ref 78–100)
MCV RBC AUTO: 105.1 FL (ref 78–100)
MCV RBC AUTO: 106.1 FL (ref 78–100)
MCV RBC AUTO: 93.1 FL (ref 78–100)
MCV RBC AUTO: 94.7 FL (ref 78–100)
MCV RBC AUTO: 96.3 FL (ref 78–100)
METAMYELOCYTES NFR BLD MANUAL: 1 %
MONOCYTES # BLD: 0.2 K/UL (ref 0.05–1.2)
MONOCYTES # BLD: 0.4 K/UL (ref 0.05–1.2)
MONOCYTES # BLD: 0.6 K/UL (ref 0.05–1.2)
MONOCYTES # BLD: 0.6 K/UL (ref 0.05–1.2)
MONOCYTES # BLD: 0.7 K/UL (ref 0.05–1.2)
MONOCYTES # BLD: 0.7 K/UL (ref 0.05–1.2)
MONOCYTES # BLD: 0.8 K/UL (ref 0.05–1.2)
MONOCYTES # BLD: 0.8 K/UL (ref 0.05–1.2)
MONOCYTES # BLD: 0.9 K/UL (ref 0.05–1.2)
MONOCYTES # BLD: 1.1 K/UL (ref 0.05–1.2)
MONOCYTES # BLD: 1.5 K/UL (ref 0.05–1.2)
MONOCYTES NFR BLD: 1 % (ref 3–10)
MONOCYTES NFR BLD: 2 % (ref 3–10)
MONOCYTES NFR BLD: 4 % (ref 3–10)
MONOCYTES NFR BLD: 5 % (ref 3–10)
MONOCYTES NFR BLD: 6 % (ref 3–10)
MONOCYTES NFR BLD: 6 % (ref 3–10)
MONOCYTES NFR BLD: 7 % (ref 3–10)
MONOCYTES NFR BLD: 7 % (ref 3–10)
NEUTS BAND NFR BLD MANUAL: 2 %
NEUTS BAND NFR BLD MANUAL: 2 % (ref 0–5)
NEUTS BAND NFR BLD MANUAL: 5 % (ref 0–5)
NEUTS SEG # BLD: 11.8 K/UL (ref 1.8–8)
NEUTS SEG # BLD: 12.3 K/UL (ref 1.8–8)
NEUTS SEG # BLD: 12.6 K/UL (ref 1.8–8)
NEUTS SEG # BLD: 12.7 K/UL (ref 1.8–8)
NEUTS SEG # BLD: 13.1 K/UL (ref 1.8–8)
NEUTS SEG # BLD: 14.9 K/UL (ref 1.8–8)
NEUTS SEG # BLD: 15.3 K/UL (ref 1.8–8)
NEUTS SEG # BLD: 16.7 K/UL (ref 1.8–8)
NEUTS SEG # BLD: 17.4 K/UL (ref 1.8–8)
NEUTS SEG # BLD: 20 K/UL (ref 1.8–8)
NEUTS SEG # BLD: 6.8 K/UL (ref 1.8–8)
NEUTS SEG NFR BLD: 75 % (ref 40–73)
NEUTS SEG NFR BLD: 85 % (ref 40–73)
NEUTS SEG NFR BLD: 87 % (ref 40–73)
NEUTS SEG NFR BLD: 87 % (ref 40–73)
NEUTS SEG NFR BLD: 90 % (ref 40–73)
NEUTS SEG NFR BLD: 91 % (ref 40–73)
NEUTS SEG NFR BLD: 94 % (ref 40–73)
NITRITE UR QL STRIP.AUTO: NEGATIVE
NRBC # BLD: 0 K/UL (ref 0–0.01)
NRBC # BLD: 0.02 K/UL (ref 0–0.01)
NRBC # BLD: 0.03 K/UL (ref 0–0.01)
NRBC # BLD: 0.14 K/UL (ref 0–0.01)
NRBC # BLD: 0.46 K/UL (ref 0–0.01)
NRBC # BLD: 1.53 K/UL (ref 0–0.01)
NRBC BLD-RTO: 0 PER 100 WBC
NRBC BLD-RTO: 0.1 PER 100 WBC
NRBC BLD-RTO: 0.2 PER 100 WBC
NRBC BLD-RTO: 0.2 PER 100 WBC
NRBC BLD-RTO: 1.1 PER 100 WBC
NRBC BLD-RTO: 3.3 PER 100 WBC
NRBC BLD-RTO: 8.9 PER 100 WBC
O2/TOTAL GAS SETTING VFR VENT: 100 %
O2/TOTAL GAS SETTING VFR VENT: 15 %
O2/TOTAL GAS SETTING VFR VENT: 50 %
O2/TOTAL GAS SETTING VFR VENT: 60 %
O2/TOTAL GAS SETTING VFR VENT: 70 %
O2/TOTAL GAS SETTING VFR VENT: 80 %
O2/TOTAL GAS SETTING VFR VENT: 80 %
O2/TOTAL GAS SETTING VFR VENT: 90 %
PCO2 BLD: 19.8 MMHG (ref 35–45)
PCO2 BLD: 22.3 MMHG (ref 35–45)
PCO2 BLD: 23.5 MMHG (ref 35–45)
PCO2 BLD: 23.6 MMHG (ref 35–45)
PCO2 BLD: 30 MMHG (ref 35–45)
PCO2 BLD: 31.3 MMHG (ref 35–45)
PCO2 BLD: 32.7 MMHG (ref 35–45)
PCO2 BLD: 32.9 MMHG (ref 35–45)
PCO2 BLD: 36.5 MMHG (ref 35–45)
PCO2 BLD: 40.6 MMHG (ref 35–45)
PCO2 BLD: 41.2 MMHG (ref 35–45)
PCO2 BLD: 42.1 MMHG (ref 35–45)
PCO2 BLD: 53.3 MMHG (ref 35–45)
PCO2 BLD: 53.7 MMHG (ref 35–45)
PCO2 BLDV: 29.6 MMHG (ref 41–51)
PEEP RESPIRATORY: 10 CMH2O
PEEP RESPIRATORY: 12 CMH2O
PEEP RESPIRATORY: 12 CMH2O
PEEP RESPIRATORY: 14 CMH2O
PEEP RESPIRATORY: 14 CMH2O
PEEP RESPIRATORY: 8 CMH2O
PEEP RESPIRATORY: 8 CM[H2O]
PH BLD: 7.18 [PH] (ref 7.35–7.45)
PH BLD: 7.25 [PH] (ref 7.35–7.45)
PH BLD: 7.25 [PH] (ref 7.35–7.45)
PH BLD: 7.28 [PH] (ref 7.35–7.45)
PH BLD: 7.35 [PH] (ref 7.35–7.45)
PH BLD: 7.36 [PH] (ref 7.35–7.45)
PH BLD: 7.36 [PH] (ref 7.35–7.45)
PH BLD: 7.38 [PH] (ref 7.35–7.45)
PH BLD: 7.44 [PH] (ref 7.35–7.45)
PH BLD: 7.45 [PH] (ref 7.35–7.45)
PH BLD: 7.48 [PH] (ref 7.35–7.45)
PH BLD: 7.49 [PH] (ref 7.35–7.45)
PH BLD: 7.53 [PH] (ref 7.35–7.45)
PH BLD: 7.58 [PH] (ref 7.35–7.45)
PH BLDV: 7.46 [PH] (ref 7.32–7.42)
PH UR STRIP: 7 [PH] (ref 5–8)
PHOSPHATE SERPL-MCNC: 1.6 MG/DL (ref 2.5–4.9)
PHOSPHATE SERPL-MCNC: 1.7 MG/DL (ref 2.5–4.9)
PHOSPHATE SERPL-MCNC: 1.8 MG/DL (ref 2.5–4.9)
PHOSPHATE SERPL-MCNC: 1.9 MG/DL (ref 2.5–4.9)
PHOSPHATE SERPL-MCNC: 1.9 MG/DL (ref 2.5–4.9)
PHOSPHATE SERPL-MCNC: 2 MG/DL (ref 2.5–4.9)
PHOSPHATE SERPL-MCNC: 2.1 MG/DL (ref 2.5–4.9)
PHOSPHATE SERPL-MCNC: 2.2 MG/DL (ref 2.5–4.9)
PHOSPHATE SERPL-MCNC: 2.3 MG/DL (ref 2.5–4.9)
PHOSPHATE SERPL-MCNC: 2.4 MG/DL (ref 2.5–4.9)
PHOSPHATE SERPL-MCNC: 2.6 MG/DL (ref 2.5–4.9)
PHOSPHATE SERPL-MCNC: 2.7 MG/DL (ref 2.5–4.9)
PHOSPHATE SERPL-MCNC: 2.9 MG/DL (ref 2.5–4.9)
PHOSPHATE SERPL-MCNC: 2.9 MG/DL (ref 2.5–4.9)
PHOSPHATE SERPL-MCNC: 3.2 MG/DL (ref 2.5–4.9)
PHOSPHATE SERPL-MCNC: 3.2 MG/DL (ref 2.5–4.9)
PHOSPHATE SERPL-MCNC: 3.3 MG/DL (ref 2.5–4.9)
PHOSPHATE SERPL-MCNC: 3.4 MG/DL (ref 2.5–4.9)
PHOSPHATE SERPL-MCNC: 3.6 MG/DL (ref 2.5–4.9)
PHOSPHATE SERPL-MCNC: 3.6 MG/DL (ref 2.5–4.9)
PHOSPHATE SERPL-MCNC: 3.7 MG/DL (ref 2.5–4.9)
PHOSPHATE SERPL-MCNC: 3.8 MG/DL (ref 2.5–4.9)
PHOSPHATE SERPL-MCNC: 5.2 MG/DL (ref 2.5–4.9)
PHOSPHATE SERPL-MCNC: 5.6 MG/DL (ref 2.5–4.9)
PIP ISTAT,IPIP: 16
PIP ISTAT,IPIP: 17
PIP ISTAT,IPIP: 18
PIP ISTAT,IPIP: 20
PLATELET # BLD AUTO: 106 K/UL (ref 135–420)
PLATELET # BLD AUTO: 129 K/UL (ref 135–420)
PLATELET # BLD AUTO: 137 K/UL (ref 135–420)
PLATELET # BLD AUTO: 164 K/UL (ref 135–420)
PLATELET # BLD AUTO: 166 K/UL (ref 135–420)
PLATELET # BLD AUTO: 173 K/UL (ref 135–420)
PLATELET # BLD AUTO: 177 K/UL (ref 135–420)
PLATELET # BLD AUTO: 193 K/UL (ref 135–420)
PLATELET # BLD AUTO: 229 K/UL (ref 135–420)
PLATELET # BLD AUTO: 23 K/UL (ref 135–420)
PLATELET # BLD AUTO: 255 K/UL (ref 135–420)
PLATELET # BLD AUTO: 41 K/UL (ref 135–420)
PLATELET # BLD AUTO: 55 K/UL (ref 135–420)
PLATELET COMMENTS,PCOM: ABNORMAL
PLATELET FACTOR 4,XPF4T: NEGATIVE
PMV BLD AUTO: 10.4 FL (ref 9.2–11.8)
PMV BLD AUTO: 10.5 FL (ref 9.2–11.8)
PMV BLD AUTO: 10.6 FL (ref 9.2–11.8)
PMV BLD AUTO: 10.7 FL (ref 9.2–11.8)
PMV BLD AUTO: 10.7 FL (ref 9.2–11.8)
PMV BLD AUTO: 12.5 FL (ref 9.2–11.8)
PMV BLD AUTO: 9.3 FL (ref 9.2–11.8)
PMV BLD AUTO: 9.5 FL (ref 9.2–11.8)
PMV BLD AUTO: 9.5 FL (ref 9.2–11.8)
PMV BLD AUTO: 9.8 FL (ref 9.2–11.8)
PMV BLD AUTO: 9.9 FL (ref 9.2–11.8)
PO2 BLD: 146 MMHG (ref 80–100)
PO2 BLD: 209 MMHG (ref 80–100)
PO2 BLD: 326 MMHG (ref 80–100)
PO2 BLD: 54 MMHG (ref 80–100)
PO2 BLD: 58 MMHG (ref 80–100)
PO2 BLD: 62 MMHG (ref 80–100)
PO2 BLD: 65 MMHG (ref 80–100)
PO2 BLD: 65 MMHG (ref 80–100)
PO2 BLD: 67 MMHG (ref 80–100)
PO2 BLD: 70 MMHG (ref 80–100)
PO2 BLD: 74 MMHG (ref 80–100)
PO2 BLD: 82 MMHG (ref 80–100)
PO2 BLD: 91 MMHG (ref 80–100)
PO2 BLD: 91 MMHG (ref 80–100)
PO2 BLDV: 36 MMHG (ref 25–40)
POTASSIUM BLD-SCNC: 3.1 MMOL/L (ref 3.5–5.1)
POTASSIUM BLD-SCNC: 5.5 MMOL/L (ref 3.5–5.1)
POTASSIUM SERPL-SCNC: 3.5 MMOL/L (ref 3.5–5.5)
POTASSIUM SERPL-SCNC: 3.6 MMOL/L (ref 3.5–5.5)
POTASSIUM SERPL-SCNC: 3.7 MMOL/L (ref 3.5–5.5)
POTASSIUM SERPL-SCNC: 3.9 MMOL/L (ref 3.5–5.5)
POTASSIUM SERPL-SCNC: 4.1 MMOL/L (ref 3.5–5.5)
POTASSIUM SERPL-SCNC: 4.2 MMOL/L (ref 3.5–5.5)
POTASSIUM SERPL-SCNC: 4.3 MMOL/L (ref 3.5–5.5)
POTASSIUM SERPL-SCNC: 4.3 MMOL/L (ref 3.5–5.5)
POTASSIUM SERPL-SCNC: 4.4 MMOL/L (ref 3.5–5.5)
POTASSIUM SERPL-SCNC: 4.5 MMOL/L (ref 3.5–5.5)
POTASSIUM SERPL-SCNC: 4.6 MMOL/L (ref 3.5–5.5)
POTASSIUM SERPL-SCNC: 4.6 MMOL/L (ref 3.5–5.5)
POTASSIUM SERPL-SCNC: 4.7 MMOL/L (ref 3.5–5.5)
POTASSIUM SERPL-SCNC: 4.8 MMOL/L (ref 3.5–5.5)
POTASSIUM SERPL-SCNC: 4.8 MMOL/L (ref 3.5–5.5)
POTASSIUM SERPL-SCNC: 4.9 MMOL/L (ref 3.5–5.5)
POTASSIUM SERPL-SCNC: 4.9 MMOL/L (ref 3.5–5.5)
POTASSIUM SERPL-SCNC: 5 MMOL/L (ref 3.5–5.5)
POTASSIUM SERPL-SCNC: 5 MMOL/L (ref 3.5–5.5)
POTASSIUM SERPL-SCNC: 5.3 MMOL/L (ref 3.5–5.5)
PRESSURE SUPPORT SETTING VENT: 6 CMH2O
PROCALCITONIN SERPL-MCNC: 11.15 NG/ML
PROCALCITONIN SERPL-MCNC: 14.14 NG/ML
PROCALCITONIN SERPL-MCNC: 18.63 NG/ML
PROCALCITONIN SERPL-MCNC: 6.46 NG/ML
PROT SERPL-MCNC: 5.6 G/DL (ref 6.4–8.2)
PROT SERPL-MCNC: 5.8 G/DL (ref 6.4–8.2)
PROT SERPL-MCNC: 6.1 G/DL (ref 6.4–8.2)
PROT SERPL-MCNC: 6.1 G/DL (ref 6.4–8.2)
PROT SERPL-MCNC: 6.2 G/DL (ref 6.4–8.2)
PROT UR STRIP-MCNC: NEGATIVE MG/DL
PROTHROMBIN TIME: 14.7 SEC (ref 11.5–15.2)
PROTHROMBIN TIME: 15.4 SEC (ref 11.5–15.2)
PROTHROMBIN TIME: 15.7 SEC (ref 11.5–15.2)
PROTHROMBIN TIME: 15.9 SEC (ref 11.5–15.2)
PROTHROMBIN TIME: 21.3 SEC (ref 11.5–15.2)
Q-T INTERVAL, ECG07: 380 MS
Q-T INTERVAL, ECG07: 388 MS
QRS DURATION, ECG06: 68 MS
QRS DURATION, ECG06: 70 MS
QTC CALCULATION (BEZET), ECG08: 529 MS
QTC CALCULATION (BEZET), ECG08: 534 MS
RBC # BLD AUTO: 2.26 M/UL (ref 4.2–5.3)
RBC # BLD AUTO: 2.31 M/UL (ref 4.2–5.3)
RBC # BLD AUTO: 2.35 M/UL (ref 4.2–5.3)
RBC # BLD AUTO: 2.4 M/UL (ref 4.2–5.3)
RBC # BLD AUTO: 2.4 M/UL (ref 4.2–5.3)
RBC # BLD AUTO: 2.46 M/UL (ref 4.2–5.3)
RBC # BLD AUTO: 2.57 M/UL (ref 4.2–5.3)
RBC # BLD AUTO: 2.68 M/UL (ref 4.2–5.3)
RBC # BLD AUTO: 2.78 M/UL (ref 4.2–5.3)
RBC # BLD AUTO: 2.89 M/UL (ref 4.2–5.3)
RBC # BLD AUTO: 2.91 M/UL (ref 4.2–5.3)
RBC # BLD AUTO: 3.34 M/UL (ref 4.2–5.3)
RBC # BLD AUTO: 3.34 M/UL (ref 4.2–5.3)
RBC #/AREA URNS HPF: ABNORMAL /HPF (ref 0–5)
RBC MORPH BLD: ABNORMAL
RSV RNA SPEC QL NAA+PROBE: NOT DETECTED
RV+EV RNA SPEC QL NAA+PROBE: NOT DETECTED
S PNEUM AG UR QL: NEGATIVE
SAO2 % BLD: 73 %
SAO2 % BLD: 86.6 % (ref 92–97)
SAO2 % BLD: 87.7 % (ref 92–97)
SAO2 % BLD: 92.7 % (ref 92–97)
SAO2 % BLD: 93.7 % (ref 92–97)
SAO2 % BLD: 94 %
SAO2 % BLD: 95.1 % (ref 92–97)
SAO2 % BLD: 95.3 % (ref 92–97)
SAO2 % BLD: 95.4 % (ref 92–97)
SAO2 % BLD: 95.6 % (ref 92–97)
SAO2 % BLD: 96.6 % (ref 92–97)
SAO2 % BLD: 97 % (ref 92–97)
SAO2 % BLD: 99.1 % (ref 92–97)
SAO2 % BLD: 99.6 % (ref 92–97)
SAO2 % BLD: 99.9 % (ref 92–97)
SARS-COV-2 PCR, COVPCR: NOT DETECTED
SARS-COV-2, COV2: NOT DETECTED
SERVICE CMNT-IMP: ABNORMAL
SERVICE CMNT-IMP: NORMAL
SODIUM BLD-SCNC: 141 MMOL/L (ref 136–145)
SODIUM BLD-SCNC: 142 MMOL/L (ref 136–145)
SODIUM SERPL-SCNC: 134 MMOL/L (ref 136–145)
SODIUM SERPL-SCNC: 135 MMOL/L (ref 136–145)
SODIUM SERPL-SCNC: 136 MMOL/L (ref 136–145)
SODIUM SERPL-SCNC: 137 MMOL/L (ref 136–145)
SODIUM SERPL-SCNC: 138 MMOL/L (ref 136–145)
SODIUM SERPL-SCNC: 139 MMOL/L (ref 136–145)
SODIUM SERPL-SCNC: 140 MMOL/L (ref 136–145)
SODIUM SERPL-SCNC: 142 MMOL/L (ref 136–145)
SODIUM SERPL-SCNC: 143 MMOL/L (ref 136–145)
SP GR UR REFRACTOMETRY: 1.01 (ref 1–1.03)
SPECIMEN EXP DATE BLD: NORMAL
SPECIMEN SITE: ABNORMAL
SPECIMEN SITE: ABNORMAL
SPECIMEN TYPE: ABNORMAL
SPECIMEN TYPE: NORMAL
STATUS OF UNIT,%ST: NORMAL
TOTAL RESP. RATE, ITRR: 17
TOTAL RESP. RATE, ITRR: 18
TOTAL RESP. RATE, ITRR: 22
TOTAL RESP. RATE, ITRR: 26
TOTAL RESP. RATE, ITRR: 26
TOTAL RESP. RATE, ITRR: 33
TOTAL RESP. RATE, ITRR: 50
TROPONIN-HIGH SENSITIVITY: 14 NG/L (ref 0–54)
TROPONIN-HIGH SENSITIVITY: 18 NG/L (ref 0–54)
TROPONIN-HIGH SENSITIVITY: 32 NG/L (ref 0–54)
TROPONIN-HIGH SENSITIVITY: 44 NG/L (ref 0–54)
UNIT DIVISION, %UDIV: 0
UROBILINOGEN UR QL STRIP.AUTO: 2 EU/DL (ref 0.2–1)
VANCOMYCIN SERPL-MCNC: 12.1 UG/ML (ref 5–40)
VANCOMYCIN SERPL-MCNC: 14 UG/ML (ref 5–40)
VANCOMYCIN SERPL-MCNC: 19 UG/ML (ref 5–40)
VANCOMYCIN SERPL-MCNC: 21.1 UG/ML (ref 5–40)
VANCOMYCIN SERPL-MCNC: 7.4 UG/ML (ref 5–40)
VENTILATION MODE VENT: ABNORMAL
VENTILATION MODE VENT: NORMAL
VENTRICULAR RATE, ECG03: 112 BPM
VENTRICULAR RATE, ECG03: 119 BPM
VT SETTING VENT: 400 ML
VT SETTING VENT: 690 ML
WBC # BLD AUTO: 11.6 K/UL (ref 4.6–13.2)
WBC # BLD AUTO: 13.1 K/UL (ref 4.6–13.2)
WBC # BLD AUTO: 13.7 K/UL (ref 4.6–13.2)
WBC # BLD AUTO: 13.9 K/UL (ref 4.6–13.2)
WBC # BLD AUTO: 14.9 K/UL (ref 4.6–13.2)
WBC # BLD AUTO: 14.9 K/UL (ref 4.6–13.2)
WBC # BLD AUTO: 15.5 K/UL (ref 4.6–13.2)
WBC # BLD AUTO: 17.2 K/UL (ref 4.6–13.2)
WBC # BLD AUTO: 17.5 K/UL (ref 4.6–13.2)
WBC # BLD AUTO: 17.6 K/UL (ref 4.6–13.2)
WBC # BLD AUTO: 19.3 K/UL (ref 4.6–13.2)
WBC # BLD AUTO: 21.7 K/UL (ref 4.6–13.2)
WBC # BLD AUTO: 9 K/UL (ref 4.6–13.2)
WBC URNS QL MICRO: ABNORMAL /HPF (ref 0–4)

## 2022-01-01 PROCEDURE — 82962 GLUCOSE BLOOD TEST: CPT

## 2022-01-01 PROCEDURE — 83605 ASSAY OF LACTIC ACID: CPT

## 2022-01-01 PROCEDURE — 94660 CPAP INITIATION&MGMT: CPT

## 2022-01-01 PROCEDURE — 03HY32Z INSERTION OF MONITORING DEVICE INTO UPPER ARTERY, PERCUTANEOUS APPROACH: ICD-10-PCS | Performed by: PHYSICIAN ASSISTANT

## 2022-01-01 PROCEDURE — 74011000258 HC RX REV CODE- 258: Performed by: NURSE PRACTITIONER

## 2022-01-01 PROCEDURE — 80069 RENAL FUNCTION PANEL: CPT

## 2022-01-01 PROCEDURE — 99291 CRITICAL CARE FIRST HOUR: CPT | Performed by: INTERNAL MEDICINE

## 2022-01-01 PROCEDURE — 85610 PROTHROMBIN TIME: CPT

## 2022-01-01 PROCEDURE — 94762 N-INVAS EAR/PLS OXIMTRY CONT: CPT

## 2022-01-01 PROCEDURE — 82330 ASSAY OF CALCIUM: CPT

## 2022-01-01 PROCEDURE — 36415 COLL VENOUS BLD VENIPUNCTURE: CPT

## 2022-01-01 PROCEDURE — 74011000250 HC RX REV CODE- 250: Performed by: INTERNAL MEDICINE

## 2022-01-01 PROCEDURE — 2709999900 HC NON-CHARGEABLE SUPPLY

## 2022-01-01 PROCEDURE — 36556 INSERT NON-TUNNEL CV CATH: CPT

## 2022-01-01 PROCEDURE — 99233 SBSQ HOSP IP/OBS HIGH 50: CPT | Performed by: INTERNAL MEDICINE

## 2022-01-01 PROCEDURE — 77030009814 HC LDWRE ECG PHIL -B

## 2022-01-01 PROCEDURE — 82947 ASSAY GLUCOSE BLOOD QUANT: CPT

## 2022-01-01 PROCEDURE — 80048 BASIC METABOLIC PNL TOTAL CA: CPT

## 2022-01-01 PROCEDURE — 74011250636 HC RX REV CODE- 250/636

## 2022-01-01 PROCEDURE — 77030018842 HC SOL IRR SOD CL 9% BAXT -A

## 2022-01-01 PROCEDURE — 74011250636 HC RX REV CODE- 250/636: Performed by: INTERNAL MEDICINE

## 2022-01-01 PROCEDURE — C9113 INJ PANTOPRAZOLE SODIUM, VIA: HCPCS | Performed by: NURSE PRACTITIONER

## 2022-01-01 PROCEDURE — 71045 X-RAY EXAM CHEST 1 VIEW: CPT

## 2022-01-01 PROCEDURE — 94003 VENT MGMT INPAT SUBQ DAY: CPT

## 2022-01-01 PROCEDURE — 74011000258 HC RX REV CODE- 258: Performed by: PHYSICIAN ASSISTANT

## 2022-01-01 PROCEDURE — 83735 ASSAY OF MAGNESIUM: CPT

## 2022-01-01 PROCEDURE — 90945 DIALYSIS ONE EVALUATION: CPT

## 2022-01-01 PROCEDURE — 74011000250 HC RX REV CODE- 250: Performed by: NURSE PRACTITIONER

## 2022-01-01 PROCEDURE — 30233N1 TRANSFUSION OF NONAUTOLOGOUS RED BLOOD CELLS INTO PERIPHERAL VEIN, PERCUTANEOUS APPROACH: ICD-10-PCS | Performed by: INTERNAL MEDICINE

## 2022-01-01 PROCEDURE — 74011250637 HC RX REV CODE- 250/637: Performed by: HOSPITALIST

## 2022-01-01 PROCEDURE — 74011000258 HC RX REV CODE- 258: Performed by: EMERGENCY MEDICINE

## 2022-01-01 PROCEDURE — 36600 WITHDRAWAL OF ARTERIAL BLOOD: CPT

## 2022-01-01 PROCEDURE — 87636 SARSCOV2 & INF A&B AMP PRB: CPT

## 2022-01-01 PROCEDURE — 74011000250 HC RX REV CODE- 250: Performed by: HOSPITALIST

## 2022-01-01 PROCEDURE — 83036 HEMOGLOBIN GLYCOSYLATED A1C: CPT

## 2022-01-01 PROCEDURE — 74011250637 HC RX REV CODE- 250/637: Performed by: INTERNAL MEDICINE

## 2022-01-01 PROCEDURE — 93005 ELECTROCARDIOGRAM TRACING: CPT

## 2022-01-01 PROCEDURE — 5A1D90Z PERFORMANCE OF URINARY FILTRATION, CONTINUOUS, GREATER THAN 18 HOURS PER DAY: ICD-10-PCS | Performed by: INTERNAL MEDICINE

## 2022-01-01 PROCEDURE — 77030040922 HC BLNKT HYPOTHRM STRY -A

## 2022-01-01 PROCEDURE — 82803 BLOOD GASES ANY COMBINATION: CPT

## 2022-01-01 PROCEDURE — 87040 BLOOD CULTURE FOR BACTERIA: CPT

## 2022-01-01 PROCEDURE — 74011636637 HC RX REV CODE- 636/637

## 2022-01-01 PROCEDURE — 85025 COMPLETE CBC W/AUTO DIFF WBC: CPT

## 2022-01-01 PROCEDURE — 74011000258 HC RX REV CODE- 258: Performed by: HOSPITALIST

## 2022-01-01 PROCEDURE — 87070 CULTURE OTHR SPECIMN AEROBIC: CPT

## 2022-01-01 PROCEDURE — 93970 EXTREMITY STUDY: CPT

## 2022-01-01 PROCEDURE — 74011636637 HC RX REV CODE- 636/637: Performed by: PHYSICIAN ASSISTANT

## 2022-01-01 PROCEDURE — 74011250637 HC RX REV CODE- 250/637: Performed by: PHYSICIAN ASSISTANT

## 2022-01-01 PROCEDURE — 65610000006 HC RM INTENSIVE CARE

## 2022-01-01 PROCEDURE — 1101F PT FALLS ASSESS-DOCD LE1/YR: CPT | Performed by: SURGERY

## 2022-01-01 PROCEDURE — 76705 ECHO EXAM OF ABDOMEN: CPT

## 2022-01-01 PROCEDURE — 74011000258 HC RX REV CODE- 258: Performed by: INTERNAL MEDICINE

## 2022-01-01 PROCEDURE — 84484 ASSAY OF TROPONIN QUANT: CPT

## 2022-01-01 PROCEDURE — 99238 HOSP IP/OBS DSCHRG MGMT 30/<: CPT | Performed by: PHYSICIAN ASSISTANT

## 2022-01-01 PROCEDURE — 74018 RADEX ABDOMEN 1 VIEW: CPT

## 2022-01-01 PROCEDURE — 74011000250 HC RX REV CODE- 250

## 2022-01-01 PROCEDURE — 74011250636 HC RX REV CODE- 250/636: Performed by: PHYSICIAN ASSISTANT

## 2022-01-01 PROCEDURE — 36592 COLLECT BLOOD FROM PICC: CPT

## 2022-01-01 PROCEDURE — 74011000250 HC RX REV CODE- 250: Performed by: PHYSICIAN ASSISTANT

## 2022-01-01 PROCEDURE — 85730 THROMBOPLASTIN TIME PARTIAL: CPT

## 2022-01-01 PROCEDURE — 80053 COMPREHEN METABOLIC PANEL: CPT

## 2022-01-01 PROCEDURE — 77030040830 HC CATH URETH FOL MDII -A

## 2022-01-01 PROCEDURE — 94640 AIRWAY INHALATION TREATMENT: CPT

## 2022-01-01 PROCEDURE — P9016 RBC LEUKOCYTES REDUCED: HCPCS

## 2022-01-01 PROCEDURE — 87086 URINE CULTURE/COLONY COUNT: CPT

## 2022-01-01 PROCEDURE — 80202 ASSAY OF VANCOMYCIN: CPT

## 2022-01-01 PROCEDURE — 70450 CT HEAD/BRAIN W/O DYE: CPT

## 2022-01-01 PROCEDURE — 99204 OFFICE O/P NEW MOD 45 MIN: CPT | Performed by: SURGERY

## 2022-01-01 PROCEDURE — G8400 PT W/DXA NO RESULTS DOC: HCPCS | Performed by: SURGERY

## 2022-01-01 PROCEDURE — 87449 NOS EACH ORGANISM AG IA: CPT

## 2022-01-01 PROCEDURE — 80076 HEPATIC FUNCTION PANEL: CPT

## 2022-01-01 PROCEDURE — 74011250636 HC RX REV CODE- 250/636: Performed by: NURSE PRACTITIONER

## 2022-01-01 PROCEDURE — 77030018798 HC PMP KT ENTRL FED COVD -A

## 2022-01-01 PROCEDURE — G8510 SCR DEP NEG, NO PLAN REQD: HCPCS | Performed by: SURGERY

## 2022-01-01 PROCEDURE — P9047 ALBUMIN (HUMAN), 25%, 50ML: HCPCS | Performed by: PHYSICIAN ASSISTANT

## 2022-01-01 PROCEDURE — G8420 CALC BMI NORM PARAMETERS: HCPCS | Performed by: SURGERY

## 2022-01-01 PROCEDURE — 36430 TRANSFUSION BLD/BLD COMPNT: CPT

## 2022-01-01 PROCEDURE — 99292 CRITICAL CARE ADDL 30 MIN: CPT | Performed by: INTERNAL MEDICINE

## 2022-01-01 PROCEDURE — 0202U NFCT DS 22 TRGT SARS-COV-2: CPT

## 2022-01-01 PROCEDURE — 99223 1ST HOSP IP/OBS HIGH 75: CPT | Performed by: HOSPITALIST

## 2022-01-01 PROCEDURE — G8427 DOCREV CUR MEDS BY ELIG CLIN: HCPCS | Performed by: SURGERY

## 2022-01-01 PROCEDURE — 5A09357 ASSISTANCE WITH RESPIRATORY VENTILATION, LESS THAN 24 CONSECUTIVE HOURS, CONTINUOUS POSITIVE AIRWAY PRESSURE: ICD-10-PCS | Performed by: HOSPITALIST

## 2022-01-01 PROCEDURE — 96375 TX/PRO/DX INJ NEW DRUG ADDON: CPT

## 2022-01-01 PROCEDURE — 77030038269 HC DRN EXT URIN PURWCK BARD -A

## 2022-01-01 PROCEDURE — 74011250636 HC RX REV CODE- 250/636: Performed by: HOSPITALIST

## 2022-01-01 PROCEDURE — 85362 FIBRIN DEGRADATION PRODUCTS: CPT

## 2022-01-01 PROCEDURE — 74011250636 HC RX REV CODE- 250/636: Performed by: NURSE ANESTHETIST, CERTIFIED REGISTERED

## 2022-01-01 PROCEDURE — 74011250637 HC RX REV CODE- 250/637: Performed by: NURSE PRACTITIONER

## 2022-01-01 PROCEDURE — 85018 HEMOGLOBIN: CPT

## 2022-01-01 PROCEDURE — 81001 URINALYSIS AUTO W/SCOPE: CPT

## 2022-01-01 PROCEDURE — P9047 ALBUMIN (HUMAN), 25%, 50ML: HCPCS | Performed by: INTERNAL MEDICINE

## 2022-01-01 PROCEDURE — 85027 COMPLETE CBC AUTOMATED: CPT

## 2022-01-01 PROCEDURE — G8536 NO DOC ELDER MAL SCRN: HCPCS | Performed by: SURGERY

## 2022-01-01 PROCEDURE — 36620 INSERTION CATHETER ARTERY: CPT

## 2022-01-01 PROCEDURE — 96374 THER/PROPH/DIAG INJ IV PUSH: CPT

## 2022-01-01 PROCEDURE — 02HV33Z INSERTION OF INFUSION DEVICE INTO SUPERIOR VENA CAVA, PERCUTANEOUS APPROACH: ICD-10-PCS | Performed by: INTERNAL MEDICINE

## 2022-01-01 PROCEDURE — 94002 VENT MGMT INPAT INIT DAY: CPT

## 2022-01-01 PROCEDURE — APPSS30 APP SPLIT SHARED TIME 16-30 MINUTES: Performed by: PHYSICIAN ASSISTANT

## 2022-01-01 PROCEDURE — 84145 PROCALCITONIN (PCT): CPT

## 2022-01-01 PROCEDURE — 74011250636 HC RX REV CODE- 250/636: Performed by: EMERGENCY MEDICINE

## 2022-01-01 PROCEDURE — 87340 HEPATITIS B SURFACE AG IA: CPT

## 2022-01-01 PROCEDURE — 84100 ASSAY OF PHOSPHORUS: CPT

## 2022-01-01 PROCEDURE — 5A1955Z RESPIRATORY VENTILATION, GREATER THAN 96 CONSECUTIVE HOURS: ICD-10-PCS | Performed by: HOSPITALIST

## 2022-01-01 PROCEDURE — 83880 ASSAY OF NATRIURETIC PEPTIDE: CPT

## 2022-01-01 PROCEDURE — 93308 TTE F-UP OR LMTD: CPT

## 2022-01-01 PROCEDURE — 85379 FIBRIN DEGRADATION QUANT: CPT

## 2022-01-01 PROCEDURE — 86022 PLATELET ANTIBODIES: CPT

## 2022-01-01 PROCEDURE — 74011250637 HC RX REV CODE- 250/637: Performed by: EMERGENCY MEDICINE

## 2022-01-01 PROCEDURE — 31500 INSERT EMERGENCY AIRWAY: CPT

## 2022-01-01 PROCEDURE — 65270000046 HC RM TELEMETRY

## 2022-01-01 PROCEDURE — APPNB30 APP NON BILLABLE TIME 0-30 MINS: Performed by: PHYSICIAN ASSISTANT

## 2022-01-01 PROCEDURE — 74011000250 HC RX REV CODE- 250: Performed by: EMERGENCY MEDICINE

## 2022-01-01 PROCEDURE — 86706 HEP B SURFACE ANTIBODY: CPT

## 2022-01-01 PROCEDURE — 99222 1ST HOSP IP/OBS MODERATE 55: CPT | Performed by: INTERNAL MEDICINE

## 2022-01-01 PROCEDURE — 77010033678 HC OXYGEN DAILY

## 2022-01-01 PROCEDURE — 65660000004 HC RM CVT STEPDOWN

## 2022-01-01 PROCEDURE — 0BH17EZ INSERTION OF ENDOTRACHEAL AIRWAY INTO TRACHEA, VIA NATURAL OR ARTIFICIAL OPENING: ICD-10-PCS | Performed by: HOSPITALIST

## 2022-01-01 PROCEDURE — 86923 COMPATIBILITY TEST ELECTRIC: CPT

## 2022-01-01 PROCEDURE — 36556 INSERT NON-TUNNEL CV CATH: CPT | Performed by: INTERNAL MEDICINE

## 2022-01-01 PROCEDURE — 99285 EMERGENCY DEPT VISIT HI MDM: CPT

## 2022-01-01 PROCEDURE — 85384 FIBRINOGEN ACTIVITY: CPT

## 2022-01-01 PROCEDURE — 71250 CT THORAX DX C-: CPT

## 2022-01-01 PROCEDURE — 74011000250 HC RX REV CODE- 250: Performed by: NURSE ANESTHETIST, CERTIFIED REGISTERED

## 2022-01-01 PROCEDURE — 86900 BLOOD TYPING SEROLOGIC ABO: CPT

## 2022-01-01 PROCEDURE — P9047 ALBUMIN (HUMAN), 25%, 50ML: HCPCS

## 2022-01-01 PROCEDURE — 1090F PRES/ABSN URINE INCON ASSESS: CPT | Performed by: SURGERY

## 2022-01-01 PROCEDURE — 99222 1ST HOSP IP/OBS MODERATE 55: CPT | Performed by: NURSE PRACTITIONER

## 2022-01-01 PROCEDURE — 1123F ACP DISCUSS/DSCN MKR DOCD: CPT | Performed by: SURGERY

## 2022-01-01 PROCEDURE — 76937 US GUIDE VASCULAR ACCESS: CPT | Performed by: INTERNAL MEDICINE

## 2022-01-01 RX ORDER — ESCITALOPRAM OXALATE 10 MG/1
15 TABLET ORAL DAILY
Status: DISCONTINUED | OUTPATIENT
Start: 2022-01-01 | End: 2022-01-01 | Stop reason: HOSPADM

## 2022-01-01 RX ORDER — GUAIFENESIN 100 MG/5ML
81 LIQUID (ML) ORAL DAILY
Status: DISCONTINUED | OUTPATIENT
Start: 2022-01-01 | End: 2022-01-01

## 2022-01-01 RX ORDER — MAGNESIUM SULFATE HEPTAHYDRATE 40 MG/ML
2 INJECTION, SOLUTION INTRAVENOUS ONCE
Status: COMPLETED | OUTPATIENT
Start: 2022-01-01 | End: 2022-01-01

## 2022-01-01 RX ORDER — CALCIUM GLUCONATE 20 MG/ML
1 INJECTION, SOLUTION INTRAVENOUS
Status: DISPENSED | OUTPATIENT
Start: 2022-01-01 | End: 2022-01-01

## 2022-01-01 RX ORDER — ALBUMIN HUMAN 250 G/1000ML
12.5 SOLUTION INTRAVENOUS ONCE
Status: COMPLETED | OUTPATIENT
Start: 2022-01-01 | End: 2022-01-01

## 2022-01-01 RX ORDER — LANOLIN ALCOHOL/MO/W.PET/CERES
1000 CREAM (GRAM) TOPICAL DAILY
Status: DISCONTINUED | OUTPATIENT
Start: 2022-01-01 | End: 2022-01-01 | Stop reason: HOSPADM

## 2022-01-01 RX ORDER — MIDAZOLAM HYDROCHLORIDE 1 MG/ML
INJECTION, SOLUTION INTRAMUSCULAR; INTRAVENOUS
Status: COMPLETED
Start: 2022-01-01 | End: 2022-01-01

## 2022-01-01 RX ORDER — BUMETANIDE 0.25 MG/ML
1.5 INJECTION INTRAMUSCULAR; INTRAVENOUS ONCE
Status: COMPLETED | OUTPATIENT
Start: 2022-01-01 | End: 2022-01-01

## 2022-01-01 RX ORDER — METOPROLOL TARTRATE 5 MG/5ML
5 INJECTION INTRAVENOUS ONCE
Status: COMPLETED | OUTPATIENT
Start: 2022-01-01 | End: 2022-01-01

## 2022-01-01 RX ORDER — INSULIN LISPRO 100 [IU]/ML
INJECTION, SOLUTION INTRAVENOUS; SUBCUTANEOUS EVERY 6 HOURS
Status: DISCONTINUED | OUTPATIENT
Start: 2022-01-01 | End: 2022-01-01 | Stop reason: HOSPADM

## 2022-01-01 RX ORDER — HEPARIN SODIUM 10000 [USP'U]/100ML
18-36 INJECTION, SOLUTION INTRAVENOUS
Status: DISCONTINUED | OUTPATIENT
Start: 2022-01-01 | End: 2022-01-01

## 2022-01-01 RX ORDER — ALBUMIN HUMAN 250 G/1000ML
SOLUTION INTRAVENOUS
Status: COMPLETED
Start: 2022-01-01 | End: 2022-01-01

## 2022-01-01 RX ORDER — PHENYLEPHRINE HCL IN 0.9% NACL 1 MG/10 ML
100-400 SYRINGE (ML) INTRAVENOUS ONCE
Status: COMPLETED | OUTPATIENT
Start: 2022-01-01 | End: 2022-01-01

## 2022-01-01 RX ORDER — MILRINONE LACTATE 0.2 MG/ML
0-.75 INJECTION, SOLUTION INTRAVENOUS CONTINUOUS
Status: DISCONTINUED | OUTPATIENT
Start: 2022-01-01 | End: 2022-01-01 | Stop reason: HOSPADM

## 2022-01-01 RX ORDER — MAGNESIUM SULFATE 100 %
4 CRYSTALS MISCELLANEOUS AS NEEDED
Status: DISCONTINUED | OUTPATIENT
Start: 2022-01-01 | End: 2022-01-01 | Stop reason: HOSPADM

## 2022-01-01 RX ORDER — LANOLIN ALCOHOL/MO/W.PET/CERES
325 CREAM (GRAM) TOPICAL
Status: DISCONTINUED | OUTPATIENT
Start: 2022-01-01 | End: 2022-01-01 | Stop reason: HOSPADM

## 2022-01-01 RX ORDER — CALCIUM GLUCONATE 20 MG/ML
1 INJECTION, SOLUTION INTRAVENOUS
Status: COMPLETED | OUTPATIENT
Start: 2022-01-01 | End: 2022-01-01

## 2022-01-01 RX ORDER — IPRATROPIUM BROMIDE AND ALBUTEROL SULFATE 2.5; .5 MG/3ML; MG/3ML
SOLUTION RESPIRATORY (INHALATION)
Status: DISPENSED
Start: 2022-01-01 | End: 2022-01-01

## 2022-01-01 RX ORDER — DIGOXIN 0.25 MG/ML
0.06 INJECTION INTRAMUSCULAR; INTRAVENOUS
Status: DISCONTINUED | OUTPATIENT
Start: 2022-01-01 | End: 2022-01-01 | Stop reason: HOSPADM

## 2022-01-01 RX ORDER — HEPARIN SODIUM 5000 [USP'U]/ML
5000 INJECTION, SOLUTION INTRAVENOUS; SUBCUTANEOUS EVERY 8 HOURS
Status: DISCONTINUED | OUTPATIENT
Start: 2022-01-01 | End: 2022-01-01 | Stop reason: HOSPADM

## 2022-01-01 RX ORDER — SODIUM CHLORIDE 0.9 % (FLUSH) 0.9 %
5-40 SYRINGE (ML) INJECTION AS NEEDED
Status: DISCONTINUED | OUTPATIENT
Start: 2022-01-01 | End: 2022-01-01 | Stop reason: HOSPADM

## 2022-01-01 RX ORDER — INSULIN GLARGINE 100 [IU]/ML
6 INJECTION, SOLUTION SUBCUTANEOUS DAILY
Status: DISCONTINUED | OUTPATIENT
Start: 2022-01-01 | End: 2022-01-01 | Stop reason: HOSPADM

## 2022-01-01 RX ORDER — IPRATROPIUM BROMIDE AND ALBUTEROL SULFATE 2.5; .5 MG/3ML; MG/3ML
3 SOLUTION RESPIRATORY (INHALATION)
Status: DISCONTINUED | OUTPATIENT
Start: 2022-01-01 | End: 2022-01-01 | Stop reason: HOSPADM

## 2022-01-01 RX ORDER — GUAIFENESIN 100 MG/5ML
81 LIQUID (ML) ORAL DAILY
Status: DISCONTINUED | OUTPATIENT
Start: 2022-01-01 | End: 2022-01-01 | Stop reason: HOSPADM

## 2022-01-01 RX ORDER — METOPROLOL TARTRATE 25 MG/1
25 TABLET, FILM COATED ORAL EVERY 8 HOURS
Status: DISCONTINUED | OUTPATIENT
Start: 2022-01-01 | End: 2022-01-01

## 2022-01-01 RX ORDER — ALBUMIN HUMAN 250 G/1000ML
25 SOLUTION INTRAVENOUS ONCE
Status: COMPLETED | OUTPATIENT
Start: 2022-01-01 | End: 2022-01-01

## 2022-01-01 RX ORDER — CALCIUM GLUCONATE 20 MG/ML
1 INJECTION, SOLUTION INTRAVENOUS ONCE
Status: COMPLETED | OUTPATIENT
Start: 2022-01-01 | End: 2022-01-01

## 2022-01-01 RX ORDER — DIGOXIN 0.25 MG/ML
INJECTION INTRAMUSCULAR; INTRAVENOUS
Status: COMPLETED
Start: 2022-01-01 | End: 2022-01-01

## 2022-01-01 RX ORDER — FUROSEMIDE 10 MG/ML
40 INJECTION INTRAMUSCULAR; INTRAVENOUS ONCE
Status: COMPLETED | OUTPATIENT
Start: 2022-01-01 | End: 2022-01-01

## 2022-01-01 RX ORDER — HYDROCORTISONE SODIUM SUCCINATE 100 MG/2ML
25 INJECTION, POWDER, FOR SOLUTION INTRAMUSCULAR; INTRAVENOUS EVERY 6 HOURS
Status: DISCONTINUED | OUTPATIENT
Start: 2022-01-01 | End: 2022-01-01 | Stop reason: HOSPADM

## 2022-01-01 RX ORDER — METOPROLOL TARTRATE 25 MG/1
25 TABLET, FILM COATED ORAL 3 TIMES DAILY
Status: DISCONTINUED | OUTPATIENT
Start: 2022-01-01 | End: 2022-01-01 | Stop reason: HOSPADM

## 2022-01-01 RX ORDER — ATORVASTATIN CALCIUM 40 MG/1
80 TABLET, FILM COATED ORAL
Status: DISCONTINUED | OUTPATIENT
Start: 2022-01-01 | End: 2022-01-01

## 2022-01-01 RX ORDER — DOCUSATE SODIUM 50 MG/5ML
100 LIQUID ORAL DAILY
Status: DISCONTINUED | OUTPATIENT
Start: 2022-01-01 | End: 2022-01-01 | Stop reason: HOSPADM

## 2022-01-01 RX ORDER — FENTANYL CITRATE 50 UG/ML
50 INJECTION, SOLUTION INTRAMUSCULAR; INTRAVENOUS ONCE
Status: COMPLETED | OUTPATIENT
Start: 2022-01-01 | End: 2022-01-01

## 2022-01-01 RX ORDER — SODIUM BICARBONATE 1 MEQ/ML
100 SYRINGE (ML) INTRAVENOUS ONCE
Status: COMPLETED | OUTPATIENT
Start: 2022-01-01 | End: 2022-01-01

## 2022-01-01 RX ORDER — EPINEPHRINE 1 MG/ML
INJECTION, SOLUTION, CONCENTRATE INTRAVENOUS
Status: DISCONTINUED
Start: 2022-01-01 | End: 2022-01-01 | Stop reason: HOSPADM

## 2022-01-01 RX ORDER — HYDROCORTISONE SODIUM SUCCINATE 100 MG/2ML
50 INJECTION, POWDER, FOR SOLUTION INTRAMUSCULAR; INTRAVENOUS EVERY 6 HOURS
Status: DISCONTINUED | OUTPATIENT
Start: 2022-01-01 | End: 2022-01-01

## 2022-01-01 RX ORDER — PANTOPRAZOLE SODIUM 40 MG/1
40 TABLET, DELAYED RELEASE ORAL DAILY
Status: DISCONTINUED | OUTPATIENT
Start: 2022-01-01 | End: 2022-01-01

## 2022-01-01 RX ORDER — MAGNESIUM SULFATE HEPTAHYDRATE 40 MG/ML
2 INJECTION, SOLUTION INTRAVENOUS ONCE
Status: DISCONTINUED | OUTPATIENT
Start: 2022-01-01 | End: 2022-01-01

## 2022-01-01 RX ORDER — DIGOXIN 0.25 MG/ML
INJECTION INTRAMUSCULAR; INTRAVENOUS
Status: DISPENSED
Start: 2022-01-01 | End: 2022-01-01

## 2022-01-01 RX ORDER — LORAZEPAM 2 MG/ML
0.5 INJECTION, SOLUTION INTRAMUSCULAR; INTRAVENOUS ONCE
Status: COMPLETED | OUTPATIENT
Start: 2022-01-01 | End: 2022-01-01

## 2022-01-01 RX ORDER — ETOMIDATE 2 MG/ML
INJECTION INTRAVENOUS AS NEEDED
Status: SHIPPED | OUTPATIENT
Start: 2022-01-01

## 2022-01-01 RX ORDER — MIDAZOLAM HYDROCHLORIDE 1 MG/ML
2 INJECTION, SOLUTION INTRAMUSCULAR; INTRAVENOUS ONCE
Status: COMPLETED | OUTPATIENT
Start: 2022-01-01 | End: 2022-01-01

## 2022-01-01 RX ORDER — PHENYLEPHRINE HCL IN 0.9% NACL 1 MG/10 ML
SYRINGE (ML) INTRAVENOUS
Status: COMPLETED
Start: 2022-01-01 | End: 2022-01-01

## 2022-01-01 RX ORDER — SUCCINYLCHOLINE CHLORIDE 100 MG/5ML
SYRINGE (ML) INTRAVENOUS AS NEEDED
Status: SHIPPED | OUTPATIENT
Start: 2022-01-01

## 2022-01-01 RX ORDER — POLYETHYLENE GLYCOL 3350 17 G/17G
17 POWDER, FOR SOLUTION ORAL
Status: DISCONTINUED | OUTPATIENT
Start: 2022-01-01 | End: 2022-01-01 | Stop reason: HOSPADM

## 2022-01-01 RX ORDER — LISINOPRIL 5 MG/1
5 TABLET ORAL DAILY
Status: DISCONTINUED | OUTPATIENT
Start: 2022-01-01 | End: 2022-01-01 | Stop reason: HOSPADM

## 2022-01-01 RX ORDER — CLOPIDOGREL BISULFATE 75 MG/1
75 TABLET ORAL DAILY
Status: DISCONTINUED | OUTPATIENT
Start: 2022-01-01 | End: 2022-01-01

## 2022-01-01 RX ORDER — DOCUSATE SODIUM 100 MG/1
100 CAPSULE, LIQUID FILLED ORAL DAILY
Status: DISCONTINUED | OUTPATIENT
Start: 2022-01-01 | End: 2022-01-01

## 2022-01-01 RX ORDER — MEMANTINE HYDROCHLORIDE 10 MG/1
10 TABLET ORAL 2 TIMES DAILY
Status: DISCONTINUED | OUTPATIENT
Start: 2022-01-01 | End: 2022-01-01

## 2022-01-01 RX ORDER — ACETAMINOPHEN 650 MG/1
650 SUPPOSITORY RECTAL
Status: DISCONTINUED | OUTPATIENT
Start: 2022-01-01 | End: 2022-01-01

## 2022-01-01 RX ORDER — CLOPIDOGREL BISULFATE 75 MG/1
75 TABLET ORAL DAILY
Status: DISCONTINUED | OUTPATIENT
Start: 2022-01-01 | End: 2022-01-01 | Stop reason: HOSPADM

## 2022-01-01 RX ORDER — ASPIRIN 81 MG/1
81 TABLET ORAL DAILY
Status: DISCONTINUED | OUTPATIENT
Start: 2022-01-01 | End: 2022-01-01

## 2022-01-01 RX ORDER — ATORVASTATIN CALCIUM 40 MG/1
80 TABLET, FILM COATED ORAL
Status: DISCONTINUED | OUTPATIENT
Start: 2022-01-01 | End: 2022-01-01 | Stop reason: HOSPADM

## 2022-01-01 RX ORDER — FUROSEMIDE 20 MG/1
40 TABLET ORAL DAILY
Status: DISCONTINUED | OUTPATIENT
Start: 2022-01-01 | End: 2022-01-01

## 2022-01-01 RX ORDER — SODIUM CHLORIDE 0.9 % (FLUSH) 0.9 %
5-40 SYRINGE (ML) INJECTION EVERY 8 HOURS
Status: DISCONTINUED | OUTPATIENT
Start: 2022-01-01 | End: 2022-01-01

## 2022-01-01 RX ORDER — DIGOXIN 0.25 MG/ML
250 INJECTION INTRAMUSCULAR; INTRAVENOUS EVERY 6 HOURS
Status: COMPLETED | OUTPATIENT
Start: 2022-01-01 | End: 2022-01-01

## 2022-01-01 RX ORDER — FENTANYL CITRATE 50 UG/ML
50 INJECTION, SOLUTION INTRAMUSCULAR; INTRAVENOUS CONTINUOUS
Status: DISCONTINUED | OUTPATIENT
Start: 2022-01-01 | End: 2022-01-01

## 2022-01-01 RX ORDER — FENTANYL CITRATE 50 UG/ML
INJECTION, SOLUTION INTRAMUSCULAR; INTRAVENOUS
Status: COMPLETED
Start: 2022-01-01 | End: 2022-01-01

## 2022-01-01 RX ORDER — ALBUMIN HUMAN 250 G/1000ML
12.5 SOLUTION INTRAVENOUS
Status: DISPENSED | OUTPATIENT
Start: 2022-01-01 | End: 2022-01-01

## 2022-01-01 RX ORDER — ACETAMINOPHEN 325 MG/1
650 TABLET ORAL ONCE
Status: COMPLETED | OUTPATIENT
Start: 2022-01-01 | End: 2022-01-01

## 2022-01-01 RX ORDER — MIDAZOLAM HYDROCHLORIDE 1 MG/ML
1 INJECTION, SOLUTION INTRAMUSCULAR; INTRAVENOUS
Status: DISCONTINUED | OUTPATIENT
Start: 2022-01-01 | End: 2022-01-01 | Stop reason: HOSPADM

## 2022-01-01 RX ORDER — ACETAMINOPHEN 325 MG/1
650 TABLET ORAL
Status: DISCONTINUED | OUTPATIENT
Start: 2022-01-01 | End: 2022-01-01 | Stop reason: HOSPADM

## 2022-01-01 RX ORDER — MIDODRINE HYDROCHLORIDE 2.5 MG/1
2.5 TABLET ORAL 2 TIMES DAILY
Status: DISCONTINUED | OUTPATIENT
Start: 2022-01-01 | End: 2022-01-01

## 2022-01-01 RX ORDER — CHLORHEXIDINE GLUCONATE 1.2 MG/ML
10 RINSE ORAL EVERY 12 HOURS
Status: DISCONTINUED | OUTPATIENT
Start: 2022-01-01 | End: 2022-01-01 | Stop reason: HOSPADM

## 2022-01-01 RX ORDER — SODIUM BICARBONATE 1 MEQ/ML
50 SYRINGE (ML) INTRAVENOUS ONCE
Status: COMPLETED | OUTPATIENT
Start: 2022-01-01 | End: 2022-01-01

## 2022-01-01 RX ORDER — FENTANYL CITRATE 50 UG/ML
100 INJECTION, SOLUTION INTRAMUSCULAR; INTRAVENOUS
Status: DISCONTINUED | OUTPATIENT
Start: 2022-01-01 | End: 2022-01-01 | Stop reason: HOSPADM

## 2022-01-01 RX ORDER — CLONAZEPAM 0.5 MG/1
0.5 TABLET ORAL
Status: DISCONTINUED | OUTPATIENT
Start: 2022-01-01 | End: 2022-01-01

## 2022-01-01 RX ORDER — RAMIPRIL 1.25 MG/1
1.25 CAPSULE ORAL DAILY
Status: DISCONTINUED | OUTPATIENT
Start: 2022-01-01 | End: 2022-01-01 | Stop reason: CLARIF

## 2022-01-01 RX ORDER — DEXMEDETOMIDINE HYDROCHLORIDE 4 UG/ML
.1-1.5 INJECTION, SOLUTION INTRAVENOUS
Status: DISCONTINUED | OUTPATIENT
Start: 2022-01-01 | End: 2022-01-01 | Stop reason: HOSPADM

## 2022-01-01 RX ORDER — SODIUM CHLORIDE 9 MG/ML
250 INJECTION, SOLUTION INTRAVENOUS AS NEEDED
Status: DISCONTINUED | OUTPATIENT
Start: 2022-01-01 | End: 2022-01-01 | Stop reason: HOSPADM

## 2022-01-01 RX ORDER — BUMETANIDE 0.25 MG/ML
2 INJECTION INTRAMUSCULAR; INTRAVENOUS ONCE
Status: COMPLETED | OUTPATIENT
Start: 2022-01-01 | End: 2022-01-01

## 2022-01-01 RX ORDER — HEPARIN 100 UNIT/ML
300 SYRINGE INTRAVENOUS AS NEEDED
Status: DISCONTINUED | OUTPATIENT
Start: 2022-01-01 | End: 2022-01-01

## 2022-01-01 RX ORDER — HEPARIN SODIUM (PORCINE) LOCK FLUSH IV SOLN 100 UNIT/ML 100 UNIT/ML
300 SOLUTION INTRAVENOUS AS NEEDED
Status: DISCONTINUED | OUTPATIENT
Start: 2022-01-01 | End: 2022-01-01 | Stop reason: HOSPADM

## 2022-01-01 RX ORDER — DEXTROSE MONOHYDRATE 100 MG/ML
0-250 INJECTION, SOLUTION INTRAVENOUS AS NEEDED
Status: DISCONTINUED | OUTPATIENT
Start: 2022-01-01 | End: 2022-01-01 | Stop reason: HOSPADM

## 2022-01-01 RX ORDER — INSULIN GLARGINE 100 [IU]/ML
2 INJECTION, SOLUTION SUBCUTANEOUS ONCE
Status: COMPLETED | OUTPATIENT
Start: 2022-01-01 | End: 2022-01-01

## 2022-01-01 RX ORDER — MEMANTINE HYDROCHLORIDE 10 MG/1
10 TABLET ORAL 2 TIMES DAILY
Status: DISCONTINUED | OUTPATIENT
Start: 2022-01-01 | End: 2022-01-01 | Stop reason: HOSPADM

## 2022-01-01 RX ORDER — MIDODRINE HYDROCHLORIDE 5 MG/1
10 TABLET ORAL 3 TIMES DAILY
Status: DISCONTINUED | OUTPATIENT
Start: 2022-01-01 | End: 2022-01-01 | Stop reason: HOSPADM

## 2022-01-01 RX ORDER — POTASSIUM CHLORIDE 7.45 MG/ML
10 INJECTION INTRAVENOUS
Status: COMPLETED | OUTPATIENT
Start: 2022-01-01 | End: 2022-01-01

## 2022-01-01 RX ORDER — LORAZEPAM 1 MG/1
0.5 TABLET ORAL
Status: COMPLETED | OUTPATIENT
Start: 2022-01-01 | End: 2022-01-01

## 2022-01-01 RX ORDER — INSULIN GLARGINE 100 [IU]/ML
4 INJECTION, SOLUTION SUBCUTANEOUS DAILY
Status: DISCONTINUED | OUTPATIENT
Start: 2022-01-01 | End: 2022-01-01

## 2022-01-01 RX ORDER — ESCITALOPRAM OXALATE 10 MG/1
15 TABLET ORAL DAILY
Status: DISCONTINUED | OUTPATIENT
Start: 2022-01-01 | End: 2022-01-01

## 2022-01-01 RX ORDER — LORAZEPAM 2 MG/ML
0.5 INJECTION INTRAMUSCULAR
Status: DISCONTINUED | OUTPATIENT
Start: 2022-01-01 | End: 2022-01-01

## 2022-01-01 RX ORDER — VANCOMYCIN HYDROCHLORIDE
1250 ONCE
Status: COMPLETED | OUTPATIENT
Start: 2022-01-01 | End: 2022-01-01

## 2022-01-01 RX ORDER — DIGOXIN 0.25 MG/ML
250 INJECTION INTRAMUSCULAR; INTRAVENOUS
Status: COMPLETED | OUTPATIENT
Start: 2022-01-01 | End: 2022-01-01

## 2022-01-01 RX ORDER — HEPARIN SODIUM 5000 [USP'U]/ML
5000 INJECTION, SOLUTION INTRAVENOUS; SUBCUTANEOUS EVERY 8 HOURS
Status: DISCONTINUED | OUTPATIENT
Start: 2022-01-01 | End: 2022-01-01

## 2022-01-01 RX ORDER — FUROSEMIDE 10 MG/ML
20 INJECTION INTRAMUSCULAR; INTRAVENOUS ONCE
Status: COMPLETED | OUTPATIENT
Start: 2022-01-01 | End: 2022-01-01

## 2022-01-01 RX ADMIN — IPRATROPIUM BROMIDE AND ALBUTEROL SULFATE 3 ML: 2.5; .5 SOLUTION RESPIRATORY (INHALATION) at 02:17

## 2022-01-01 RX ADMIN — IPRATROPIUM BROMIDE AND ALBUTEROL SULFATE 3 ML: 2.5; .5 SOLUTION RESPIRATORY (INHALATION) at 07:56

## 2022-01-01 RX ADMIN — IPRATROPIUM BROMIDE AND ALBUTEROL SULFATE 3 ML: 2.5; .5 SOLUTION RESPIRATORY (INHALATION) at 03:01

## 2022-01-01 RX ADMIN — ALBUMIN (HUMAN) 12.5 G: 0.25 INJECTION, SOLUTION INTRAVENOUS at 23:31

## 2022-01-01 RX ADMIN — IPRATROPIUM BROMIDE AND ALBUTEROL SULFATE 3 ML: 2.5; .5 SOLUTION RESPIRATORY (INHALATION) at 19:21

## 2022-01-01 RX ADMIN — MILRINONE LACTATE IN DEXTROSE 0.38 MCG/KG/MIN: 200 INJECTION, SOLUTION INTRAVENOUS at 04:43

## 2022-01-01 RX ADMIN — DIGOXIN 250 MCG: 0.25 INJECTION INTRAMUSCULAR; INTRAVENOUS at 02:38

## 2022-01-01 RX ADMIN — MIDODRINE HYDROCHLORIDE 2.5 MG: 2.5 TABLET ORAL at 17:09

## 2022-01-01 RX ADMIN — DEXMEDETOMIDINE HYDROCHLORIDE 1.2 MCG/KG/HR: 4 INJECTION, SOLUTION INTRAVENOUS at 13:27

## 2022-01-01 RX ADMIN — SODIUM CHLORIDE, PRESERVATIVE FREE 40 MG: 5 INJECTION INTRAVENOUS at 09:14

## 2022-01-01 RX ADMIN — HEPARIN SODIUM (PORCINE) LOCK FLUSH IV SOLN 100 UNIT/ML 300 UNITS: 100 SOLUTION at 02:57

## 2022-01-01 RX ADMIN — CALCIUM CHLORIDE, MAGNESIUM CHLORIDE, DEXTROSE MONOHYDRATE, LACTIC ACID, SODIUM CHLORIDE, SODIUM BICARBONATE AND POTASSIUM CHLORIDE: 3.68; 3.05; 22; 5.4; 6.46; 3.09; .314 INJECTION INTRAVENOUS at 20:25

## 2022-01-01 RX ADMIN — ASPIRIN 81 MG: 81 TABLET, COATED ORAL at 08:36

## 2022-01-01 RX ADMIN — PIPERACILLIN AND TAZOBACTAM 3.38 G: 3; .375 INJECTION, POWDER, FOR SOLUTION INTRAVENOUS at 17:56

## 2022-01-01 RX ADMIN — WATER 2 G: 1 INJECTION INTRAMUSCULAR; INTRAVENOUS; SUBCUTANEOUS at 18:43

## 2022-01-01 RX ADMIN — Medication 2 UNITS: at 13:00

## 2022-01-01 RX ADMIN — VASOPRESSIN 0.03 UNITS/MIN: 20 INJECTION INTRAVENOUS at 11:18

## 2022-01-01 RX ADMIN — CALCIUM CHLORIDE, MAGNESIUM CHLORIDE, DEXTROSE MONOHYDRATE, LACTIC ACID, SODIUM CHLORIDE, SODIUM BICARBONATE AND POTASSIUM CHLORIDE: 3.68; 3.05; 22; 5.4; 6.46; 3.09; .314 INJECTION INTRAVENOUS at 02:17

## 2022-01-01 RX ADMIN — DEXMEDETOMIDINE HYDROCHLORIDE 1.2 MCG/KG/HR: 4 INJECTION, SOLUTION INTRAVENOUS at 19:59

## 2022-01-01 RX ADMIN — MEMANTINE HYDROCHLORIDE 10 MG: 10 TABLET ORAL at 17:07

## 2022-01-01 RX ADMIN — POTASSIUM CHLORIDE 10 MEQ: 7.46 INJECTION, SOLUTION INTRAVENOUS at 19:20

## 2022-01-01 RX ADMIN — CALCIUM CHLORIDE, MAGNESIUM CHLORIDE, DEXTROSE MONOHYDRATE, LACTIC ACID, SODIUM CHLORIDE, SODIUM BICARBONATE AND POTASSIUM CHLORIDE: 3.68; 3.05; 22; 5.4; 6.46; 3.09; .314 INJECTION INTRAVENOUS at 20:37

## 2022-01-01 RX ADMIN — Medication 2 UNITS: at 13:40

## 2022-01-01 RX ADMIN — IPRATROPIUM BROMIDE AND ALBUTEROL SULFATE 3 ML: 2.5; .5 SOLUTION RESPIRATORY (INHALATION) at 19:36

## 2022-01-01 RX ADMIN — CALCIUM GLUCONATE 1000 MG: 20 INJECTION, SOLUTION INTRAVENOUS at 01:23

## 2022-01-01 RX ADMIN — IPRATROPIUM BROMIDE AND ALBUTEROL SULFATE 3 ML: 2.5; .5 SOLUTION RESPIRATORY (INHALATION) at 08:02

## 2022-01-01 RX ADMIN — ASPIRIN 81 MG 81 MG: 81 TABLET ORAL at 11:12

## 2022-01-01 RX ADMIN — FENTANYL CITRATE 150 MCG/HR: 0.05 INJECTION, SOLUTION INTRAMUSCULAR; INTRAVENOUS at 12:34

## 2022-01-01 RX ADMIN — FENTANYL CITRATE 150 MCG/HR: 0.05 INJECTION, SOLUTION INTRAMUSCULAR; INTRAVENOUS at 06:55

## 2022-01-01 RX ADMIN — IPRATROPIUM BROMIDE AND ALBUTEROL SULFATE 3 ML: 2.5; .5 SOLUTION RESPIRATORY (INHALATION) at 07:23

## 2022-01-01 RX ADMIN — IPRATROPIUM BROMIDE AND ALBUTEROL SULFATE 3 ML: 2.5; .5 SOLUTION RESPIRATORY (INHALATION) at 19:15

## 2022-01-01 RX ADMIN — SODIUM PHOSPHATE, MONOBASIC, MONOHYDRATE AND SODIUM PHOSPHATE, DIBASIC, ANHYDROUS: 276; 142 INJECTION, SOLUTION INTRAVENOUS at 02:53

## 2022-01-01 RX ADMIN — MIDAZOLAM HYDROCHLORIDE 2 MG: 1 INJECTION, SOLUTION INTRAMUSCULAR; INTRAVENOUS at 03:43

## 2022-01-01 RX ADMIN — PIPERACILLIN AND TAZOBACTAM 3.38 G: 3; .375 INJECTION, POWDER, FOR SOLUTION INTRAVENOUS at 09:56

## 2022-01-01 RX ADMIN — FENTANYL CITRATE 150 MCG/HR: 0.05 INJECTION, SOLUTION INTRAMUSCULAR; INTRAVENOUS at 08:15

## 2022-01-01 RX ADMIN — AMIODARONE HYDROCHLORIDE 1 MG/MIN: 1.8 INJECTION, SOLUTION INTRAVENOUS at 09:51

## 2022-01-01 RX ADMIN — CALCIUM CHLORIDE, MAGNESIUM CHLORIDE, DEXTROSE MONOHYDRATE, LACTIC ACID, SODIUM CHLORIDE, SODIUM BICARBONATE AND POTASSIUM CHLORIDE: 3.68; 3.05; 22; 5.4; 6.46; 3.09; .314 INJECTION INTRAVENOUS at 02:55

## 2022-01-01 RX ADMIN — FUROSEMIDE 40 MG: 10 INJECTION, SOLUTION INTRAMUSCULAR; INTRAVENOUS at 12:11

## 2022-01-01 RX ADMIN — MIDODRINE HYDROCHLORIDE 10 MG: 5 TABLET ORAL at 09:14

## 2022-01-01 RX ADMIN — HYDROCORTISONE SODIUM SUCCINATE 25 MG: 100 INJECTION, POWDER, FOR SOLUTION INTRAMUSCULAR; INTRAVENOUS at 11:12

## 2022-01-01 RX ADMIN — ATORVASTATIN CALCIUM 80 MG: 40 TABLET, FILM COATED ORAL at 21:10

## 2022-01-01 RX ADMIN — Medication 4 UNITS: at 11:03

## 2022-01-01 RX ADMIN — CALCIUM GLUCONATE 4 G: 98 INJECTION, SOLUTION INTRAVENOUS at 09:52

## 2022-01-01 RX ADMIN — VANCOMYCIN HYDROCHLORIDE 750 MG: 750 INJECTION, POWDER, LYOPHILIZED, FOR SOLUTION INTRAVENOUS at 05:30

## 2022-01-01 RX ADMIN — DEXTROSE MONOHYDRATE 250 ML: 100 INJECTION, SOLUTION INTRAVENOUS at 05:13

## 2022-01-01 RX ADMIN — PIPERACILLIN AND TAZOBACTAM 4.5 G: 4; .5 INJECTION, POWDER, FOR SOLUTION INTRAVENOUS at 21:14

## 2022-01-01 RX ADMIN — Medication 6 UNITS: at 09:06

## 2022-01-01 RX ADMIN — LORAZEPAM 0.5 MG: 2 INJECTION INTRAMUSCULAR; INTRAVENOUS at 04:16

## 2022-01-01 RX ADMIN — CALCIUM CHLORIDE, MAGNESIUM CHLORIDE, DEXTROSE MONOHYDRATE, LACTIC ACID, SODIUM CHLORIDE, SODIUM BICARBONATE AND POTASSIUM CHLORIDE: 3.68; 3.05; 22; 5.4; 6.46; 3.09; .314 INJECTION INTRAVENOUS at 08:00

## 2022-01-01 RX ADMIN — FENTANYL CITRATE 150 MCG/HR: 0.05 INJECTION, SOLUTION INTRAMUSCULAR; INTRAVENOUS at 21:09

## 2022-01-01 RX ADMIN — DEXMEDETOMIDINE HYDROCHLORIDE 1.2 MCG/KG/HR: 4 INJECTION, SOLUTION INTRAVENOUS at 22:55

## 2022-01-01 RX ADMIN — ATORVASTATIN CALCIUM 80 MG: 40 TABLET, FILM COATED ORAL at 21:25

## 2022-01-01 RX ADMIN — FUROSEMIDE 40 MG: 10 INJECTION INTRAMUSCULAR; INTRAVENOUS at 18:36

## 2022-01-01 RX ADMIN — Medication 4 UNITS: at 09:13

## 2022-01-01 RX ADMIN — FERROUS SULFATE TAB 325 MG (65 MG ELEMENTAL FE) 325 MG: 325 (65 FE) TAB at 08:24

## 2022-01-01 RX ADMIN — MEMANTINE HYDROCHLORIDE 10 MG: 10 TABLET ORAL at 11:54

## 2022-01-01 RX ADMIN — MIDODRINE HYDROCHLORIDE 10 MG: 5 TABLET ORAL at 09:29

## 2022-01-01 RX ADMIN — PIPERACILLIN AND TAZOBACTAM 3.38 G: 3; .375 INJECTION, POWDER, FOR SOLUTION INTRAVENOUS at 02:00

## 2022-01-01 RX ADMIN — CALCIUM GLUCONATE 1000 MG: 20 INJECTION, SOLUTION INTRAVENOUS at 03:06

## 2022-01-01 RX ADMIN — PIPERACILLIN AND TAZOBACTAM 3.38 G: 3; .375 INJECTION, POWDER, FOR SOLUTION INTRAVENOUS at 09:14

## 2022-01-01 RX ADMIN — MEMANTINE HYDROCHLORIDE 10 MG: 10 TABLET ORAL at 17:46

## 2022-01-01 RX ADMIN — IPRATROPIUM BROMIDE AND ALBUTEROL SULFATE 3 ML: 2.5; .5 SOLUTION RESPIRATORY (INHALATION) at 17:49

## 2022-01-01 RX ADMIN — DEXMEDETOMIDINE HYDROCHLORIDE 1.2 MCG/KG/HR: 4 INJECTION, SOLUTION INTRAVENOUS at 06:49

## 2022-01-01 RX ADMIN — CYANOCOBALAMIN TAB 1000 MCG 1000 MCG: 1000 TAB at 09:14

## 2022-01-01 RX ADMIN — METOPROLOL TARTRATE 25 MG: 25 TABLET, FILM COATED ORAL at 17:07

## 2022-01-01 RX ADMIN — HYDROCORTISONE SODIUM SUCCINATE 25 MG: 100 INJECTION, POWDER, FOR SOLUTION INTRAMUSCULAR; INTRAVENOUS at 23:07

## 2022-01-01 RX ADMIN — Medication 2 UNITS: at 17:57

## 2022-01-01 RX ADMIN — LORAZEPAM 0.5 MG: 2 INJECTION, SOLUTION INTRAMUSCULAR; INTRAVENOUS at 19:14

## 2022-01-01 RX ADMIN — DEXMEDETOMIDINE HYDROCHLORIDE 0.3 MCG/KG/HR: 4 INJECTION, SOLUTION INTRAVENOUS at 14:06

## 2022-01-01 RX ADMIN — FERROUS SULFATE TAB 325 MG (65 MG ELEMENTAL FE) 325 MG: 325 (65 FE) TAB at 08:36

## 2022-01-01 RX ADMIN — SODIUM CHLORIDE, PRESERVATIVE FREE 40 MG: 5 INJECTION INTRAVENOUS at 09:29

## 2022-01-01 RX ADMIN — Medication 2 UNITS: at 19:23

## 2022-01-01 RX ADMIN — DEXMEDETOMIDINE HYDROCHLORIDE 1.2 MCG/KG/HR: 4 INJECTION, SOLUTION INTRAVENOUS at 10:47

## 2022-01-01 RX ADMIN — ATORVASTATIN CALCIUM 80 MG: 40 TABLET, FILM COATED ORAL at 22:00

## 2022-01-01 RX ADMIN — VASOPRESSIN 0.03 UNITS/MIN: 20 INJECTION INTRAVENOUS at 08:35

## 2022-01-01 RX ADMIN — DOXYCYCLINE 100 MG: 100 INJECTION, POWDER, LYOPHILIZED, FOR SOLUTION INTRAVENOUS at 09:21

## 2022-01-01 RX ADMIN — SODIUM CHLORIDE 100 MG: 9 INJECTION, SOLUTION INTRAVENOUS at 12:36

## 2022-01-01 RX ADMIN — CALCIUM CHLORIDE, MAGNESIUM CHLORIDE, DEXTROSE MONOHYDRATE, LACTIC ACID, SODIUM CHLORIDE, SODIUM BICARBONATE AND POTASSIUM CHLORIDE: 3.68; 3.05; 22; 5.4; 6.46; 3.09; .314 INJECTION INTRAVENOUS at 06:53

## 2022-01-01 RX ADMIN — VANCOMYCIN HYDROCHLORIDE 500 MG: 500 INJECTION, POWDER, LYOPHILIZED, FOR SOLUTION INTRAVENOUS at 18:01

## 2022-01-01 RX ADMIN — Medication 2 UNITS: at 12:00

## 2022-01-01 RX ADMIN — CALCIUM GLUCONATE 1000 MG: 20 INJECTION, SOLUTION INTRAVENOUS at 02:56

## 2022-01-01 RX ADMIN — DEXMEDETOMIDINE HYDROCHLORIDE 0.3 MCG/KG/HR: 4 INJECTION, SOLUTION INTRAVENOUS at 04:37

## 2022-01-01 RX ADMIN — IPRATROPIUM BROMIDE AND ALBUTEROL SULFATE 3 ML: 2.5; .5 SOLUTION RESPIRATORY (INHALATION) at 12:00

## 2022-01-01 RX ADMIN — CALCIUM CHLORIDE, MAGNESIUM CHLORIDE, DEXTROSE MONOHYDRATE, LACTIC ACID, SODIUM CHLORIDE, SODIUM BICARBONATE AND POTASSIUM CHLORIDE: 3.68; 3.05; 22; 5.4; 6.46; 3.09; .314 INJECTION INTRAVENOUS at 22:36

## 2022-01-01 RX ADMIN — CALCIUM CHLORIDE, MAGNESIUM CHLORIDE, DEXTROSE MONOHYDRATE, LACTIC ACID, SODIUM CHLORIDE, SODIUM BICARBONATE AND POTASSIUM CHLORIDE: 3.68; 3.05; 22; 5.4; 6.46; 3.09; .314 INJECTION INTRAVENOUS at 10:20

## 2022-01-01 RX ADMIN — IPRATROPIUM BROMIDE AND ALBUTEROL SULFATE 3 ML: 2.5; .5 SOLUTION RESPIRATORY (INHALATION) at 20:16

## 2022-01-01 RX ADMIN — MIDODRINE HYDROCHLORIDE 10 MG: 5 TABLET ORAL at 17:27

## 2022-01-01 RX ADMIN — CHLORHEXIDINE GLUCONATE 0.12% ORAL RINSE 10 ML: 1.2 LIQUID ORAL at 21:10

## 2022-01-01 RX ADMIN — MIDODRINE HYDROCHLORIDE 10 MG: 5 TABLET ORAL at 08:36

## 2022-01-01 RX ADMIN — FUROSEMIDE 20 MG: 10 INJECTION, SOLUTION INTRAMUSCULAR; INTRAVENOUS at 18:49

## 2022-01-01 RX ADMIN — VASOPRESSIN 0.03 UNITS/MIN: 20 INJECTION INTRAVENOUS at 07:36

## 2022-01-01 RX ADMIN — CALCIUM GLUCONATE 2 G: 98 INJECTION, SOLUTION INTRAVENOUS at 20:08

## 2022-01-01 RX ADMIN — DEXMEDETOMIDINE HYDROCHLORIDE 1.2 MCG/KG/HR: 4 INJECTION, SOLUTION INTRAVENOUS at 09:15

## 2022-01-01 RX ADMIN — CALCIUM GLUCONATE 4 G: 98 INJECTION, SOLUTION INTRAVENOUS at 20:00

## 2022-01-01 RX ADMIN — ALBUMIN (HUMAN) 25 G: 0.25 INJECTION, SOLUTION INTRAVENOUS at 11:30

## 2022-01-01 RX ADMIN — Medication 2 UNITS: at 18:16

## 2022-01-01 RX ADMIN — CHLORHEXIDINE GLUCONATE 0.12% ORAL RINSE 10 ML: 1.2 LIQUID ORAL at 21:19

## 2022-01-01 RX ADMIN — DEXMEDETOMIDINE HYDROCHLORIDE 1.4 MCG/KG/HR: 4 INJECTION, SOLUTION INTRAVENOUS at 02:39

## 2022-01-01 RX ADMIN — FENTANYL CITRATE 50 MCG/HR: 0.05 INJECTION, SOLUTION INTRAMUSCULAR; INTRAVENOUS at 13:41

## 2022-01-01 RX ADMIN — ASPIRIN 81 MG CHEWABLE TABLET 81 MG: 81 TABLET CHEWABLE at 09:26

## 2022-01-01 RX ADMIN — IPRATROPIUM BROMIDE AND ALBUTEROL SULFATE 3 ML: 2.5; .5 SOLUTION RESPIRATORY (INHALATION) at 13:21

## 2022-01-01 RX ADMIN — IPRATROPIUM BROMIDE AND ALBUTEROL SULFATE 3 ML: 2.5; .5 SOLUTION RESPIRATORY (INHALATION) at 12:21

## 2022-01-01 RX ADMIN — CHLORHEXIDINE GLUCONATE 0.12% ORAL RINSE 10 ML: 1.2 LIQUID ORAL at 20:01

## 2022-01-01 RX ADMIN — IPRATROPIUM BROMIDE AND ALBUTEROL SULFATE 3 ML: 2.5; .5 SOLUTION RESPIRATORY (INHALATION) at 15:51

## 2022-01-01 RX ADMIN — NOREPINEPHRINE BITARTRATE 4 MCG/MIN: 1 INJECTION, SOLUTION, CONCENTRATE INTRAVENOUS at 18:17

## 2022-01-01 RX ADMIN — ATORVASTATIN CALCIUM 80 MG: 40 TABLET, FILM COATED ORAL at 06:14

## 2022-01-01 RX ADMIN — ACETAMINOPHEN 650 MG: 325 TABLET, FILM COATED ORAL at 05:24

## 2022-01-01 RX ADMIN — CALCIUM GLUCONATE 2 G: 98 INJECTION, SOLUTION INTRAVENOUS at 19:19

## 2022-01-01 RX ADMIN — SODIUM CHLORIDE 5 MG/HR: 900 INJECTION, SOLUTION INTRAVENOUS at 18:56

## 2022-01-01 RX ADMIN — MILRINONE LACTATE IN DEXTROSE 0.38 MCG/KG/MIN: 200 INJECTION, SOLUTION INTRAVENOUS at 16:06

## 2022-01-01 RX ADMIN — IPRATROPIUM BROMIDE AND ALBUTEROL SULFATE 3 ML: 2.5; .5 SOLUTION RESPIRATORY (INHALATION) at 05:37

## 2022-01-01 RX ADMIN — FENTANYL CITRATE 200 MCG/HR: 0.05 INJECTION, SOLUTION INTRAMUSCULAR; INTRAVENOUS at 19:52

## 2022-01-01 RX ADMIN — MEMANTINE HYDROCHLORIDE 10 MG: 10 TABLET ORAL at 17:56

## 2022-01-01 RX ADMIN — DEXMEDETOMIDINE HYDROCHLORIDE 1.4 MCG/KG/HR: 4 INJECTION, SOLUTION INTRAVENOUS at 02:36

## 2022-01-01 RX ADMIN — MAGNESIUM SULFATE HEPTAHYDRATE 2 G: 40 INJECTION, SOLUTION INTRAVENOUS at 12:16

## 2022-01-01 RX ADMIN — DIGOXIN 250 MCG: 0.25 INJECTION INTRAMUSCULAR; INTRAVENOUS at 05:32

## 2022-01-01 RX ADMIN — HYDROCORTISONE SODIUM SUCCINATE 50 MG: 100 INJECTION, POWDER, FOR SOLUTION INTRAMUSCULAR; INTRAVENOUS at 13:55

## 2022-01-01 RX ADMIN — CALCIUM CHLORIDE, MAGNESIUM CHLORIDE, DEXTROSE MONOHYDRATE, LACTIC ACID, SODIUM CHLORIDE, SODIUM BICARBONATE AND POTASSIUM CHLORIDE: 3.68; 3.05; 22; 5.4; 6.46; 3.09; .314 INJECTION INTRAVENOUS at 01:40

## 2022-01-01 RX ADMIN — AMIODARONE HYDROCHLORIDE 1 MG/MIN: 1.8 INJECTION, SOLUTION INTRAVENOUS at 04:37

## 2022-01-01 RX ADMIN — FERROUS SULFATE TAB 325 MG (65 MG ELEMENTAL FE) 325 MG: 325 (65 FE) TAB at 06:31

## 2022-01-01 RX ADMIN — HYDROCORTISONE SODIUM SUCCINATE 50 MG: 100 INJECTION, POWDER, FOR SOLUTION INTRAMUSCULAR; INTRAVENOUS at 16:04

## 2022-01-01 RX ADMIN — DOXYCYCLINE 100 MG: 100 INJECTION, POWDER, LYOPHILIZED, FOR SOLUTION INTRAVENOUS at 18:58

## 2022-01-01 RX ADMIN — ALBUMIN (HUMAN) 25 G: 0.25 INJECTION, SOLUTION INTRAVENOUS at 14:27

## 2022-01-01 RX ADMIN — CALCIUM CHLORIDE, MAGNESIUM CHLORIDE, DEXTROSE MONOHYDRATE, LACTIC ACID, SODIUM CHLORIDE, SODIUM BICARBONATE AND POTASSIUM CHLORIDE: 3.68; 3.05; 22; 5.4; 6.46; 3.09; .314 INJECTION INTRAVENOUS at 15:30

## 2022-01-01 RX ADMIN — Medication 2 UNITS: at 00:00

## 2022-01-01 RX ADMIN — AMIODARONE HYDROCHLORIDE 0.5 MG/MIN: 1.8 INJECTION, SOLUTION INTRAVENOUS at 01:32

## 2022-01-01 RX ADMIN — VANCOMYCIN HYDROCHLORIDE 750 MG: 750 INJECTION, POWDER, LYOPHILIZED, FOR SOLUTION INTRAVENOUS at 12:11

## 2022-01-01 RX ADMIN — IPRATROPIUM BROMIDE AND ALBUTEROL SULFATE 3 ML: 2.5; .5 SOLUTION RESPIRATORY (INHALATION) at 00:12

## 2022-01-01 RX ADMIN — FENTANYL CITRATE 150 MCG/HR: 0.05 INJECTION, SOLUTION INTRAMUSCULAR; INTRAVENOUS at 11:01

## 2022-01-01 RX ADMIN — IPRATROPIUM BROMIDE AND ALBUTEROL SULFATE 3 ML: 2.5; .5 SOLUTION RESPIRATORY (INHALATION) at 03:52

## 2022-01-01 RX ADMIN — Medication 80 MG: at 07:38

## 2022-01-01 RX ADMIN — Medication 2 UNITS: at 06:19

## 2022-01-01 RX ADMIN — MIDODRINE HYDROCHLORIDE 10 MG: 5 TABLET ORAL at 17:55

## 2022-01-01 RX ADMIN — MEMANTINE HYDROCHLORIDE 10 MG: 10 TABLET ORAL at 08:36

## 2022-01-01 RX ADMIN — FENTANYL CITRATE 50 MCG: 50 INJECTION, SOLUTION INTRAMUSCULAR; INTRAVENOUS at 08:16

## 2022-01-01 RX ADMIN — HYDROCORTISONE SODIUM SUCCINATE 50 MG: 100 INJECTION, POWDER, FOR SOLUTION INTRAMUSCULAR; INTRAVENOUS at 05:32

## 2022-01-01 RX ADMIN — Medication 2 UNITS: at 11:04

## 2022-01-01 RX ADMIN — CALCIUM GLUCONATE 1000 MG: 20 INJECTION, SOLUTION INTRAVENOUS at 00:16

## 2022-01-01 RX ADMIN — NOREPINEPHRINE BITARTRATE 14 MCG/MIN: 1 SOLUTION INTRAVENOUS at 12:19

## 2022-01-01 RX ADMIN — DIGOXIN 250 MCG: 0.25 INJECTION INTRAMUSCULAR; INTRAVENOUS at 23:22

## 2022-01-01 RX ADMIN — CALCIUM GLUCONATE 1000 MG: 20 INJECTION, SOLUTION INTRAVENOUS at 06:29

## 2022-01-01 RX ADMIN — METOPROLOL TARTRATE 25 MG: 25 TABLET, FILM COATED ORAL at 14:36

## 2022-01-01 RX ADMIN — LORAZEPAM 0.5 MG: 1 TABLET ORAL at 05:23

## 2022-01-01 RX ADMIN — VANCOMYCIN HYDROCHLORIDE 1250 MG: 10 INJECTION, POWDER, LYOPHILIZED, FOR SOLUTION INTRAVENOUS at 12:00

## 2022-01-01 RX ADMIN — CALCIUM CHLORIDE, MAGNESIUM CHLORIDE, DEXTROSE MONOHYDRATE, LACTIC ACID, SODIUM CHLORIDE, SODIUM BICARBONATE AND POTASSIUM CHLORIDE: 3.68; 3.05; 22; 5.4; 6.46; 3.09; .314 INJECTION INTRAVENOUS at 05:10

## 2022-01-01 RX ADMIN — ALBUMIN (HUMAN) 12.5 G: 0.25 INJECTION, SOLUTION INTRAVENOUS at 11:02

## 2022-01-01 RX ADMIN — SODIUM CHLORIDE 2 G: 900 INJECTION, SOLUTION INTRAVENOUS at 20:05

## 2022-01-01 RX ADMIN — POLYVINYL ALCOHOL, POVIDONE 1 DROP: 14; 6 SOLUTION/ DROPS OPHTHALMIC at 21:20

## 2022-01-01 RX ADMIN — SODIUM CHLORIDE, POTASSIUM CHLORIDE, SODIUM LACTATE AND CALCIUM CHLORIDE 500 ML: 600; 310; 30; 20 INJECTION, SOLUTION INTRAVENOUS at 15:27

## 2022-01-01 RX ADMIN — HYDROCORTISONE SODIUM SUCCINATE 50 MG: 100 INJECTION, POWDER, FOR SOLUTION INTRAMUSCULAR; INTRAVENOUS at 23:46

## 2022-01-01 RX ADMIN — SODIUM CHLORIDE 2 G: 900 INJECTION, SOLUTION INTRAVENOUS at 22:07

## 2022-01-01 RX ADMIN — MEMANTINE HYDROCHLORIDE 10 MG: 10 TABLET ORAL at 17:27

## 2022-01-01 RX ADMIN — DEXMEDETOMIDINE HYDROCHLORIDE 1.2 MCG/KG/HR: 4 INJECTION, SOLUTION INTRAVENOUS at 18:09

## 2022-01-01 RX ADMIN — IPRATROPIUM BROMIDE AND ALBUTEROL SULFATE 3 ML: 2.5; .5 SOLUTION RESPIRATORY (INHALATION) at 03:18

## 2022-01-01 RX ADMIN — PANTOPRAZOLE SODIUM 40 MG: 40 TABLET, DELAYED RELEASE ORAL at 09:27

## 2022-01-01 RX ADMIN — MIDAZOLAM HYDROCHLORIDE 1 MG: 2 INJECTION, SOLUTION INTRAMUSCULAR; INTRAVENOUS at 05:37

## 2022-01-01 RX ADMIN — POLYVINYL ALCOHOL, POVIDONE 1 DROP: 14; 6 SOLUTION/ DROPS OPHTHALMIC at 05:30

## 2022-01-01 RX ADMIN — MEMANTINE HYDROCHLORIDE 10 MG: 10 TABLET ORAL at 09:14

## 2022-01-01 RX ADMIN — NOREPINEPHRINE BITARTRATE 10 MCG/MIN: 1 SOLUTION INTRAVENOUS at 05:30

## 2022-01-01 RX ADMIN — CALCIUM CHLORIDE, MAGNESIUM CHLORIDE, DEXTROSE MONOHYDRATE, LACTIC ACID, SODIUM CHLORIDE, SODIUM BICARBONATE AND POTASSIUM CHLORIDE: 3.68; 3.05; 22; 5.4; 6.46; 3.09; .314 INJECTION INTRAVENOUS at 23:51

## 2022-01-01 RX ADMIN — ACETAMINOPHEN 650 MG: 325 SUPPOSITORY RECTAL at 22:05

## 2022-01-01 RX ADMIN — CHLORHEXIDINE GLUCONATE 0.12% ORAL RINSE 10 ML: 1.2 LIQUID ORAL at 11:54

## 2022-01-01 RX ADMIN — SODIUM CHLORIDE 2 G: 900 INJECTION, SOLUTION INTRAVENOUS at 23:31

## 2022-01-01 RX ADMIN — DEXMEDETOMIDINE HYDROCHLORIDE 1.3 MCG/KG/HR: 4 INJECTION, SOLUTION INTRAVENOUS at 19:57

## 2022-01-01 RX ADMIN — CALCIUM GLUCONATE 1000 MG: 20 INJECTION, SOLUTION INTRAVENOUS at 11:35

## 2022-01-01 RX ADMIN — PIPERACILLIN AND TAZOBACTAM 3.38 G: 3; .375 INJECTION, POWDER, FOR SOLUTION INTRAVENOUS at 02:19

## 2022-01-01 RX ADMIN — MIDAZOLAM HYDROCHLORIDE 1 MG: 2 INJECTION, SOLUTION INTRAMUSCULAR; INTRAVENOUS at 14:24

## 2022-01-01 RX ADMIN — DEXMEDETOMIDINE HYDROCHLORIDE 0.2 MCG/KG/HR: 4 INJECTION, SOLUTION INTRAVENOUS at 10:13

## 2022-01-01 RX ADMIN — CYANOCOBALAMIN TAB 1000 MCG 1000 MCG: 1000 TAB at 08:36

## 2022-01-01 RX ADMIN — SODIUM PHOSPHATE, MONOBASIC, MONOHYDRATE AND SODIUM PHOSPHATE, DIBASIC, ANHYDROUS: 276; 142 INJECTION, SOLUTION INTRAVENOUS at 21:50

## 2022-01-01 RX ADMIN — MEMANTINE HYDROCHLORIDE 10 MG: 10 TABLET ORAL at 08:24

## 2022-01-01 RX ADMIN — POTASSIUM PHOSPHATE, MONOBASIC AND POTASSIUM PHOSPHATE, DIBASIC: 224; 236 INJECTION, SOLUTION, CONCENTRATE INTRAVENOUS at 09:31

## 2022-01-01 RX ADMIN — POLYVINYL ALCOHOL, POVIDONE 1 DROP: 14; 6 SOLUTION/ DROPS OPHTHALMIC at 02:40

## 2022-01-01 RX ADMIN — CALCIUM GLUCONATE 1000 MG: 20 INJECTION, SOLUTION INTRAVENOUS at 07:00

## 2022-01-01 RX ADMIN — CALCIUM CHLORIDE, MAGNESIUM CHLORIDE, DEXTROSE MONOHYDRATE, LACTIC ACID, SODIUM CHLORIDE, SODIUM BICARBONATE AND POTASSIUM CHLORIDE: 3.68; 3.05; 22; 5.4; 6.46; 3.09; .314 INJECTION INTRAVENOUS at 07:28

## 2022-01-01 RX ADMIN — DEXMEDETOMIDINE HYDROCHLORIDE 1.2 MCG/KG/HR: 4 INJECTION, SOLUTION INTRAVENOUS at 18:24

## 2022-01-01 RX ADMIN — DIGOXIN 250 MCG: 0.25 INJECTION INTRAMUSCULAR; INTRAVENOUS at 17:45

## 2022-01-01 RX ADMIN — PIPERACILLIN AND TAZOBACTAM 3.38 G: 3; .375 INJECTION, POWDER, FOR SOLUTION INTRAVENOUS at 01:39

## 2022-01-01 RX ADMIN — FENTANYL CITRATE 50 MCG/HR: 0.05 INJECTION, SOLUTION INTRAMUSCULAR; INTRAVENOUS at 14:33

## 2022-01-01 RX ADMIN — HEPARIN SODIUM 18 UNITS/KG/HR: 10000 INJECTION, SOLUTION INTRAVENOUS at 15:07

## 2022-01-01 RX ADMIN — AMIODARONE HYDROCHLORIDE 1 MG/MIN: 1.8 INJECTION, SOLUTION INTRAVENOUS at 23:51

## 2022-01-01 RX ADMIN — NOREPINEPHRINE BITARTRATE 27 MCG/MIN: 1 SOLUTION INTRAVENOUS at 20:15

## 2022-01-01 RX ADMIN — CALCIUM GLUCONATE 1000 MG: 20 INJECTION, SOLUTION INTRAVENOUS at 02:33

## 2022-01-01 RX ADMIN — VASOPRESSIN 0.03 UNITS/MIN: 20 INJECTION INTRAVENOUS at 09:12

## 2022-01-01 RX ADMIN — VASOPRESSIN 0.03 UNITS/MIN: 20 INJECTION INTRAVENOUS at 19:59

## 2022-01-01 RX ADMIN — SODIUM CHLORIDE 2 G: 900 INJECTION, SOLUTION INTRAVENOUS at 10:08

## 2022-01-01 RX ADMIN — CYANOCOBALAMIN TAB 1000 MCG 1000 MCG: 1000 TAB at 09:15

## 2022-01-01 RX ADMIN — DIGOXIN 62.5 MCG: 0.25 INJECTION INTRAMUSCULAR; INTRAVENOUS at 16:36

## 2022-01-01 RX ADMIN — Medication 2 UNITS: at 00:17

## 2022-01-01 RX ADMIN — FENTANYL CITRATE 200 MCG/HR: 0.05 INJECTION, SOLUTION INTRAMUSCULAR; INTRAVENOUS at 03:10

## 2022-01-01 RX ADMIN — CHLORHEXIDINE GLUCONATE 0.12% ORAL RINSE 10 ML: 1.2 LIQUID ORAL at 08:22

## 2022-01-01 RX ADMIN — MIDODRINE HYDROCHLORIDE 10 MG: 5 TABLET ORAL at 16:24

## 2022-01-01 RX ADMIN — METOPROLOL TARTRATE 25 MG: 25 TABLET, FILM COATED ORAL at 06:14

## 2022-01-01 RX ADMIN — HYDROCORTISONE SODIUM SUCCINATE 50 MG: 100 INJECTION, POWDER, FOR SOLUTION INTRAMUSCULAR; INTRAVENOUS at 06:23

## 2022-01-01 RX ADMIN — IPRATROPIUM BROMIDE AND ALBUTEROL SULFATE 3 ML: 2.5; .5 SOLUTION RESPIRATORY (INHALATION) at 08:27

## 2022-01-01 RX ADMIN — DEXMEDETOMIDINE HYDROCHLORIDE 1.2 MCG/KG/HR: 4 INJECTION, SOLUTION INTRAVENOUS at 00:43

## 2022-01-01 RX ADMIN — CHLORHEXIDINE GLUCONATE 0.12% ORAL RINSE 10 ML: 1.2 LIQUID ORAL at 08:36

## 2022-01-01 RX ADMIN — DEXMEDETOMIDINE HYDROCHLORIDE 1.2 MCG/KG/HR: 4 INJECTION, SOLUTION INTRAVENOUS at 08:22

## 2022-01-01 RX ADMIN — WATER 2 G: 1 INJECTION INTRAMUSCULAR; INTRAVENOUS; SUBCUTANEOUS at 17:11

## 2022-01-01 RX ADMIN — MILRINONE LACTATE IN DEXTROSE 0.38 MCG/KG/MIN: 200 INJECTION, SOLUTION INTRAVENOUS at 05:22

## 2022-01-01 RX ADMIN — IPRATROPIUM BROMIDE AND ALBUTEROL SULFATE 3 ML: 2.5; .5 SOLUTION RESPIRATORY (INHALATION) at 15:59

## 2022-01-01 RX ADMIN — ATORVASTATIN CALCIUM 80 MG: 40 TABLET, FILM COATED ORAL at 21:30

## 2022-01-01 RX ADMIN — FUROSEMIDE 40 MG: 40 TABLET ORAL at 09:25

## 2022-01-01 RX ADMIN — NOREPINEPHRINE BITARTRATE 13 MCG/MIN: 1 SOLUTION INTRAVENOUS at 12:15

## 2022-01-01 RX ADMIN — CYANOCOBALAMIN TAB 1000 MCG 1000 MCG: 1000 TAB at 11:54

## 2022-01-01 RX ADMIN — MIDODRINE HYDROCHLORIDE 10 MG: 5 TABLET ORAL at 18:02

## 2022-01-01 RX ADMIN — HYDROCORTISONE SODIUM SUCCINATE 50 MG: 100 INJECTION, POWDER, FOR SOLUTION INTRAMUSCULAR; INTRAVENOUS at 17:04

## 2022-01-01 RX ADMIN — CYANOCOBALAMIN TAB 1000 MCG 1000 MCG: 1000 TAB at 09:29

## 2022-01-01 RX ADMIN — VANCOMYCIN HYDROCHLORIDE 750 MG: 750 INJECTION, POWDER, LYOPHILIZED, FOR SOLUTION INTRAVENOUS at 06:13

## 2022-01-01 RX ADMIN — CALCIUM CHLORIDE, MAGNESIUM CHLORIDE, DEXTROSE MONOHYDRATE, LACTIC ACID, SODIUM CHLORIDE, SODIUM BICARBONATE AND POTASSIUM CHLORIDE: 3.68; 3.05; 22; 5.4; 6.46; 3.09; .314 INJECTION INTRAVENOUS at 03:06

## 2022-01-01 RX ADMIN — CLONAZEPAM 0.5 MG: 0.5 TABLET ORAL at 17:00

## 2022-01-01 RX ADMIN — SODIUM CHLORIDE 2 G: 900 INJECTION, SOLUTION INTRAVENOUS at 09:31

## 2022-01-01 RX ADMIN — MIDAZOLAM HYDROCHLORIDE 1 MG: 2 INJECTION, SOLUTION INTRAMUSCULAR; INTRAVENOUS at 09:59

## 2022-01-01 RX ADMIN — Medication 4 UNITS: at 09:17

## 2022-01-01 RX ADMIN — ALBUMIN (HUMAN) 25 G: 0.25 INJECTION, SOLUTION INTRAVENOUS at 23:45

## 2022-01-01 RX ADMIN — CALCIUM CHLORIDE, MAGNESIUM CHLORIDE, DEXTROSE MONOHYDRATE, LACTIC ACID, SODIUM CHLORIDE, SODIUM BICARBONATE AND POTASSIUM CHLORIDE 500 ML/HR: 3.68; 3.05; 22; 5.4; 6.46; 3.09; .314 INJECTION INTRAVENOUS at 17:45

## 2022-01-01 RX ADMIN — FENTANYL CITRATE 150 MCG/HR: 0.05 INJECTION, SOLUTION INTRAMUSCULAR; INTRAVENOUS at 01:55

## 2022-01-01 RX ADMIN — Medication 100 MCG: at 02:00

## 2022-01-01 RX ADMIN — CALCIUM CHLORIDE, MAGNESIUM CHLORIDE, DEXTROSE MONOHYDRATE, LACTIC ACID, SODIUM CHLORIDE, SODIUM BICARBONATE AND POTASSIUM CHLORIDE: 3.68; 3.05; 22; 5.4; 6.46; 3.09; .314 INJECTION INTRAVENOUS at 21:45

## 2022-01-01 RX ADMIN — POLYVINYL ALCOHOL, POVIDONE 1 DROP: 14; 6 SOLUTION/ DROPS OPHTHALMIC at 16:37

## 2022-01-01 RX ADMIN — MEMANTINE HYDROCHLORIDE 10 MG: 10 TABLET ORAL at 18:02

## 2022-01-01 RX ADMIN — SODIUM CHLORIDE, PRESERVATIVE FREE 40 MG: 5 INJECTION INTRAVENOUS at 08:36

## 2022-01-01 RX ADMIN — CALCIUM CHLORIDE, MAGNESIUM CHLORIDE, DEXTROSE MONOHYDRATE, LACTIC ACID, SODIUM CHLORIDE, SODIUM BICARBONATE AND POTASSIUM CHLORIDE: 3.68; 3.05; 22; 5.4; 6.46; 3.09; .314 INJECTION INTRAVENOUS at 00:06

## 2022-01-01 RX ADMIN — POTASSIUM CHLORIDE 10 MEQ: 7.46 INJECTION, SOLUTION INTRAVENOUS at 20:23

## 2022-01-01 RX ADMIN — Medication 6 UNITS: at 09:30

## 2022-01-01 RX ADMIN — SODIUM CHLORIDE, PRESERVATIVE FREE 40 MG: 5 INJECTION INTRAVENOUS at 09:13

## 2022-01-01 RX ADMIN — DEXTROSE MONOHYDRATE 250 ML: 100 INJECTION, SOLUTION INTRAVENOUS at 02:12

## 2022-01-01 RX ADMIN — ASPIRIN 81 MG: 81 TABLET, COATED ORAL at 09:14

## 2022-01-01 RX ADMIN — ARFORMOTEROL TARTRATE: 15 SOLUTION RESPIRATORY (INHALATION) at 19:10

## 2022-01-01 RX ADMIN — IPRATROPIUM BROMIDE AND ALBUTEROL SULFATE 3 ML: 2.5; .5 SOLUTION RESPIRATORY (INHALATION) at 19:11

## 2022-01-01 RX ADMIN — IPRATROPIUM BROMIDE AND ALBUTEROL SULFATE 3 ML: 2.5; .5 SOLUTION RESPIRATORY (INHALATION) at 08:10

## 2022-01-01 RX ADMIN — SODIUM CHLORIDE 100 MG: 9 INJECTION, SOLUTION INTRAVENOUS at 13:29

## 2022-01-01 RX ADMIN — Medication 2 UNITS: at 06:05

## 2022-01-01 RX ADMIN — IPRATROPIUM BROMIDE AND ALBUTEROL SULFATE 3 ML: 2.5; .5 SOLUTION RESPIRATORY (INHALATION) at 23:14

## 2022-01-01 RX ADMIN — MIDODRINE HYDROCHLORIDE 10 MG: 5 TABLET ORAL at 22:00

## 2022-01-01 RX ADMIN — AMIODARONE HYDROCHLORIDE 150 MG: 1.5 INJECTION, SOLUTION INTRAVENOUS at 23:36

## 2022-01-01 RX ADMIN — Medication 2 UNITS: at 23:33

## 2022-01-01 RX ADMIN — MIDODRINE HYDROCHLORIDE 10 MG: 5 TABLET ORAL at 08:23

## 2022-01-01 RX ADMIN — FERROUS SULFATE TAB 325 MG (65 MG ELEMENTAL FE) 325 MG: 325 (65 FE) TAB at 09:29

## 2022-01-01 RX ADMIN — SODIUM CHLORIDE, PRESERVATIVE FREE 40 MG: 5 INJECTION INTRAVENOUS at 08:22

## 2022-01-01 RX ADMIN — SODIUM BICARBONATE 50 MEQ: 84 INJECTION INTRAVENOUS at 06:29

## 2022-01-01 RX ADMIN — PIPERACILLIN AND TAZOBACTAM 3.38 G: 3; .375 INJECTION, POWDER, FOR SOLUTION INTRAVENOUS at 18:47

## 2022-01-01 RX ADMIN — CHLORHEXIDINE GLUCONATE 0.12% ORAL RINSE 10 ML: 1.2 LIQUID ORAL at 21:30

## 2022-01-01 RX ADMIN — ASPIRIN 81 MG: 81 TABLET, COATED ORAL at 09:29

## 2022-01-01 RX ADMIN — POLYVINYL ALCOHOL, POVIDONE 1 DROP: 14; 6 SOLUTION/ DROPS OPHTHALMIC at 09:29

## 2022-01-01 RX ADMIN — MIDODRINE HYDROCHLORIDE 10 MG: 5 TABLET ORAL at 21:01

## 2022-01-01 RX ADMIN — MEMANTINE HYDROCHLORIDE 10 MG: 10 TABLET ORAL at 17:04

## 2022-01-01 RX ADMIN — IPRATROPIUM BROMIDE AND ALBUTEROL SULFATE 3 ML: 2.5; .5 SOLUTION RESPIRATORY (INHALATION) at 15:23

## 2022-01-01 RX ADMIN — BUMETANIDE 2 MG: 0.25 INJECTION, SOLUTION INTRAMUSCULAR; INTRAVENOUS at 00:00

## 2022-01-01 RX ADMIN — IPRATROPIUM BROMIDE AND ALBUTEROL SULFATE 3 ML: 2.5; .5 SOLUTION RESPIRATORY (INHALATION) at 13:56

## 2022-01-01 RX ADMIN — MIDODRINE HYDROCHLORIDE 10 MG: 5 TABLET ORAL at 21:30

## 2022-01-01 RX ADMIN — CHLORHEXIDINE GLUCONATE 0.12% ORAL RINSE 10 ML: 1.2 LIQUID ORAL at 09:14

## 2022-01-01 RX ADMIN — CALCIUM GLUCONATE 1000 MG: 20 INJECTION, SOLUTION INTRAVENOUS at 22:18

## 2022-01-01 RX ADMIN — ATORVASTATIN CALCIUM 80 MG: 40 TABLET, FILM COATED ORAL at 21:20

## 2022-01-01 RX ADMIN — ASPIRIN 81 MG: 81 TABLET, COATED ORAL at 11:54

## 2022-01-01 RX ADMIN — CALCIUM CHLORIDE, MAGNESIUM CHLORIDE, DEXTROSE MONOHYDRATE, LACTIC ACID, SODIUM CHLORIDE, SODIUM BICARBONATE AND POTASSIUM CHLORIDE: 3.68; 3.05; 22; 5.4; 6.46; 3.09; .314 INJECTION INTRAVENOUS at 10:00

## 2022-01-01 RX ADMIN — MIDODRINE HYDROCHLORIDE 10 MG: 5 TABLET ORAL at 21:10

## 2022-01-01 RX ADMIN — MEMANTINE HYDROCHLORIDE 10 MG: 10 TABLET ORAL at 09:29

## 2022-01-01 RX ADMIN — CALCIUM GLUCONATE 1000 MG: 20 INJECTION, SOLUTION INTRAVENOUS at 18:02

## 2022-01-01 RX ADMIN — SODIUM CHLORIDE 200 MG: 9 INJECTION, SOLUTION INTRAVENOUS at 14:36

## 2022-01-01 RX ADMIN — CALCIUM GLUCONATE 1000 MG: 20 INJECTION, SOLUTION INTRAVENOUS at 08:16

## 2022-01-01 RX ADMIN — MIDODRINE HYDROCHLORIDE 10 MG: 5 TABLET ORAL at 21:20

## 2022-01-01 RX ADMIN — POLYETHYLENE GLYCOL 3350 17 G: 17 POWDER, FOR SOLUTION ORAL at 21:20

## 2022-01-01 RX ADMIN — POTASSIUM CHLORIDE 10 MEQ: 7.46 INJECTION, SOLUTION INTRAVENOUS at 16:50

## 2022-01-01 RX ADMIN — DOCUSATE SODIUM LIQUID 100 MG: 100 LIQUID ORAL at 11:12

## 2022-01-01 RX ADMIN — CALCIUM GLUCONATE 1000 MG: 20 INJECTION, SOLUTION INTRAVENOUS at 16:39

## 2022-01-01 RX ADMIN — FENTANYL CITRATE 200 MCG/HR: 0.05 INJECTION, SOLUTION INTRAMUSCULAR; INTRAVENOUS at 12:30

## 2022-01-01 RX ADMIN — SODIUM CHLORIDE 2 G: 9 INJECTION, SOLUTION INTRAMUSCULAR; INTRAVENOUS; SUBCUTANEOUS at 14:27

## 2022-01-01 RX ADMIN — FERROUS SULFATE TAB 325 MG (65 MG ELEMENTAL FE) 325 MG: 325 (65 FE) TAB at 09:14

## 2022-01-01 RX ADMIN — VANCOMYCIN HYDROCHLORIDE 1000 MG: 1 INJECTION, POWDER, LYOPHILIZED, FOR SOLUTION INTRAVENOUS at 21:52

## 2022-01-01 RX ADMIN — BUMETANIDE 1 MG/HR: 0.25 INJECTION INTRAMUSCULAR; INTRAVENOUS at 02:58

## 2022-01-01 RX ADMIN — CALCIUM CHLORIDE, MAGNESIUM CHLORIDE, DEXTROSE MONOHYDRATE, LACTIC ACID, SODIUM CHLORIDE, SODIUM BICARBONATE AND POTASSIUM CHLORIDE: 3.68; 3.05; 22; 5.4; 6.46; 3.09; .314 INJECTION INTRAVENOUS at 22:43

## 2022-01-01 RX ADMIN — CHLORHEXIDINE GLUCONATE 0.12% ORAL RINSE 10 ML: 1.2 LIQUID ORAL at 21:25

## 2022-01-01 RX ADMIN — MIDODRINE HYDROCHLORIDE 10 MG: 5 TABLET ORAL at 17:10

## 2022-01-01 RX ADMIN — AMIODARONE HYDROCHLORIDE 0.5 MG/MIN: 1.8 INJECTION, SOLUTION INTRAVENOUS at 22:56

## 2022-01-01 RX ADMIN — IPRATROPIUM BROMIDE AND ALBUTEROL SULFATE 3 ML: 2.5; .5 SOLUTION RESPIRATORY (INHALATION) at 23:18

## 2022-01-01 RX ADMIN — EPINEPHRINE 10 MCG/MIN: 1 INJECTION INTRAMUSCULAR; INTRAVENOUS; SUBCUTANEOUS at 01:18

## 2022-01-01 RX ADMIN — PIPERACILLIN AND TAZOBACTAM 3.38 G: 3; .375 INJECTION, POWDER, FOR SOLUTION INTRAVENOUS at 09:33

## 2022-01-01 RX ADMIN — MEMANTINE HYDROCHLORIDE 10 MG: 10 TABLET ORAL at 09:15

## 2022-01-01 RX ADMIN — CALCIUM CHLORIDE, MAGNESIUM CHLORIDE, DEXTROSE MONOHYDRATE, LACTIC ACID, SODIUM CHLORIDE, SODIUM BICARBONATE AND POTASSIUM CHLORIDE: 3.68; 3.05; 22; 5.4; 6.46; 3.09; .314 INJECTION INTRAVENOUS at 04:56

## 2022-01-01 RX ADMIN — DEXMEDETOMIDINE HYDROCHLORIDE 1.4 MCG/KG/HR: 4 INJECTION, SOLUTION INTRAVENOUS at 07:34

## 2022-01-01 RX ADMIN — MILRINONE LACTATE IN DEXTROSE 0.38 MCG/KG/MIN: 200 INJECTION, SOLUTION INTRAVENOUS at 19:59

## 2022-01-01 RX ADMIN — NOREPINEPHRINE BITARTRATE 4 MCG/MIN: 1 INJECTION, SOLUTION, CONCENTRATE INTRAVENOUS at 13:51

## 2022-01-01 RX ADMIN — PIPERACILLIN AND TAZOBACTAM 3.38 G: 3; .375 INJECTION, POWDER, FOR SOLUTION INTRAVENOUS at 18:03

## 2022-01-01 RX ADMIN — CALCIUM GLUCONATE 1000 MG: 20 INJECTION, SOLUTION INTRAVENOUS at 02:18

## 2022-01-01 RX ADMIN — MIDODRINE HYDROCHLORIDE 10 MG: 5 TABLET ORAL at 11:54

## 2022-01-01 RX ADMIN — IPRATROPIUM BROMIDE AND ALBUTEROL SULFATE 3 ML: 2.5; .5 SOLUTION RESPIRATORY (INHALATION) at 19:16

## 2022-01-01 RX ADMIN — CALCIUM CHLORIDE, MAGNESIUM CHLORIDE, DEXTROSE MONOHYDRATE, LACTIC ACID, SODIUM CHLORIDE, SODIUM BICARBONATE AND POTASSIUM CHLORIDE: 3.68; 3.05; 22; 5.4; 6.46; 3.09; .314 INJECTION INTRAVENOUS at 16:44

## 2022-01-01 RX ADMIN — HYDROCORTISONE SODIUM SUCCINATE 50 MG: 100 INJECTION, POWDER, FOR SOLUTION INTRAMUSCULAR; INTRAVENOUS at 23:22

## 2022-01-01 RX ADMIN — IPRATROPIUM BROMIDE AND ALBUTEROL SULFATE 3 ML: 2.5; .5 SOLUTION RESPIRATORY (INHALATION) at 13:14

## 2022-01-01 RX ADMIN — MEMANTINE HYDROCHLORIDE 10 MG: 10 TABLET ORAL at 11:33

## 2022-01-01 RX ADMIN — INSULIN GLARGINE 2 UNITS: 100 INJECTION, SOLUTION SUBCUTANEOUS at 11:03

## 2022-01-01 RX ADMIN — METOPROLOL TARTRATE 5 MG: 5 INJECTION INTRAVENOUS at 16:52

## 2022-01-01 RX ADMIN — FENTANYL CITRATE 150 MCG/HR: 0.05 INJECTION, SOLUTION INTRAMUSCULAR; INTRAVENOUS at 22:10

## 2022-01-01 RX ADMIN — IPRATROPIUM BROMIDE AND ALBUTEROL SULFATE 3 ML: 2.5; .5 SOLUTION RESPIRATORY (INHALATION) at 23:57

## 2022-01-01 RX ADMIN — SODIUM BICARBONATE 100 MEQ: 84 INJECTION INTRAVENOUS at 02:48

## 2022-01-01 RX ADMIN — CALCIUM GLUCONATE 1000 MG: 20 INJECTION, SOLUTION INTRAVENOUS at 21:45

## 2022-01-01 RX ADMIN — AMIODARONE HYDROCHLORIDE 0.5 MG/MIN: 1.8 INJECTION, SOLUTION INTRAVENOUS at 09:15

## 2022-01-01 RX ADMIN — HYDROCORTISONE SODIUM SUCCINATE 25 MG: 100 INJECTION, POWDER, FOR SOLUTION INTRAMUSCULAR; INTRAVENOUS at 05:13

## 2022-01-01 RX ADMIN — VASOPRESSIN 0.03 UNITS/MIN: 20 INJECTION INTRAVENOUS at 20:05

## 2022-01-01 RX ADMIN — CYANOCOBALAMIN TAB 1000 MCG 1000 MCG: 1000 TAB at 08:24

## 2022-01-01 RX ADMIN — NOREPINEPHRINE BITARTRATE 6 MCG/MIN: 1 INJECTION, SOLUTION, CONCENTRATE INTRAVENOUS at 15:18

## 2022-01-01 RX ADMIN — POTASSIUM BICARBONATE 40 MEQ: 782 TABLET, EFFERVESCENT ORAL at 06:13

## 2022-01-01 RX ADMIN — POLYVINYL ALCOHOL, POVIDONE 1 DROP: 14; 6 SOLUTION/ DROPS OPHTHALMIC at 18:19

## 2022-01-01 RX ADMIN — ESCITALOPRAM OXALATE 15 MG: 10 TABLET ORAL at 11:55

## 2022-01-01 RX ADMIN — IPRATROPIUM BROMIDE AND ALBUTEROL SULFATE 3 ML: 2.5; .5 SOLUTION RESPIRATORY (INHALATION) at 08:08

## 2022-01-01 RX ADMIN — MILRINONE LACTATE IN DEXTROSE 0.38 MCG/KG/MIN: 200 INJECTION, SOLUTION INTRAVENOUS at 12:24

## 2022-01-01 RX ADMIN — IPRATROPIUM BROMIDE AND ALBUTEROL SULFATE 3 ML: 2.5; .5 SOLUTION RESPIRATORY (INHALATION) at 11:46

## 2022-01-01 RX ADMIN — PIPERACILLIN AND TAZOBACTAM 3.38 G: 3; .375 INJECTION, POWDER, FOR SOLUTION INTRAVENOUS at 10:17

## 2022-01-01 RX ADMIN — DEXMEDETOMIDINE HYDROCHLORIDE 1.1 MCG/KG/HR: 4 INJECTION, SOLUTION INTRAVENOUS at 02:34

## 2022-01-01 RX ADMIN — BUMETANIDE 1.5 MG: 0.25 INJECTION, SOLUTION INTRAMUSCULAR; INTRAVENOUS at 18:10

## 2022-01-01 RX ADMIN — ESCITALOPRAM OXALATE 15 MG: 10 TABLET ORAL at 11:33

## 2022-01-01 RX ADMIN — Medication 6 UNITS: at 08:24

## 2022-01-01 RX ADMIN — MIDODRINE HYDROCHLORIDE 10 MG: 5 TABLET ORAL at 16:47

## 2022-01-01 RX ADMIN — MEMANTINE HYDROCHLORIDE 10 MG: 10 TABLET ORAL at 17:10

## 2022-01-01 RX ADMIN — IPRATROPIUM BROMIDE AND ALBUTEROL SULFATE 3 ML: 2.5; .5 SOLUTION RESPIRATORY (INHALATION) at 16:08

## 2022-01-01 RX ADMIN — CLOPIDOGREL 75 MG: 75 TABLET, FILM COATED ORAL at 09:27

## 2022-01-01 RX ADMIN — BUMETANIDE 0.75 MG/HR: 0.25 INJECTION INTRAMUSCULAR; INTRAVENOUS at 18:11

## 2022-01-01 RX ADMIN — Medication 2 UNITS: at 05:48

## 2022-01-01 RX ADMIN — ETOMIDATE 10 MG: 2 INJECTION, SOLUTION INTRAVENOUS at 07:38

## 2022-01-01 RX ADMIN — HYDROCORTISONE SODIUM SUCCINATE 25 MG: 100 INJECTION, POWDER, FOR SOLUTION INTRAMUSCULAR; INTRAVENOUS at 17:27

## 2022-01-01 RX ADMIN — MIDAZOLAM HYDROCHLORIDE 1 MG: 2 INJECTION, SOLUTION INTRAMUSCULAR; INTRAVENOUS at 02:28

## 2022-01-01 RX ADMIN — CHLORHEXIDINE GLUCONATE 0.12% ORAL RINSE 10 ML: 1.2 LIQUID ORAL at 09:29

## 2022-01-01 RX ADMIN — FENTANYL CITRATE 200 MCG/HR: 0.05 INJECTION, SOLUTION INTRAMUSCULAR; INTRAVENOUS at 05:32

## 2022-01-01 RX ADMIN — POTASSIUM CHLORIDE 10 MEQ: 7.46 INJECTION, SOLUTION INTRAVENOUS at 18:08

## 2022-01-01 RX ADMIN — MIDODRINE HYDROCHLORIDE 10 MG: 5 TABLET ORAL at 21:25

## 2022-01-01 RX ADMIN — FENTANYL CITRATE 100 MCG: 50 INJECTION, SOLUTION INTRAMUSCULAR; INTRAVENOUS at 19:56

## 2022-01-01 RX ADMIN — ARFORMOTEROL TARTRATE: 15 SOLUTION RESPIRATORY (INHALATION) at 07:22

## 2022-01-01 RX ADMIN — NOREPINEPHRINE BITARTRATE 50 MCG/MIN: 1 SOLUTION INTRAVENOUS at 07:33

## 2022-01-01 RX ADMIN — CHLORHEXIDINE GLUCONATE 0.12% ORAL RINSE 10 ML: 1.2 LIQUID ORAL at 09:17

## 2022-01-01 RX ADMIN — ATORVASTATIN CALCIUM 80 MG: 40 TABLET, FILM COATED ORAL at 21:01

## 2022-01-01 RX ADMIN — DEXMEDETOMIDINE HYDROCHLORIDE 1.2 MCG/KG/HR: 4 INJECTION, SOLUTION INTRAVENOUS at 04:59

## 2022-01-01 RX ADMIN — FENTANYL CITRATE 150 MCG/HR: 0.05 INJECTION, SOLUTION INTRAMUSCULAR; INTRAVENOUS at 16:07

## 2022-01-01 RX ADMIN — HYDROCORTISONE SODIUM SUCCINATE 50 MG: 100 INJECTION, POWDER, FOR SOLUTION INTRAMUSCULAR; INTRAVENOUS at 17:46

## 2022-01-01 RX ADMIN — FERROUS SULFATE TAB 325 MG (65 MG ELEMENTAL FE) 325 MG: 325 (65 FE) TAB at 11:54

## 2022-01-01 RX ADMIN — VASOPRESSIN 0.03 UNITS/MIN: 20 INJECTION INTRAVENOUS at 20:36

## 2022-01-01 RX ADMIN — CALCIUM CHLORIDE, MAGNESIUM CHLORIDE, DEXTROSE MONOHYDRATE, LACTIC ACID, SODIUM CHLORIDE, SODIUM BICARBONATE AND POTASSIUM CHLORIDE 1000 ML/HR: 3.68; 3.05; 22; 5.4; 6.46; 3.09; .314 INJECTION INTRAVENOUS at 12:00

## 2022-06-02 PROBLEM — I50.9 CHF (CONGESTIVE HEART FAILURE) (HCC): Status: ACTIVE | Noted: 2022-01-01

## 2022-07-17 PROBLEM — I50.43 ACUTE ON CHRONIC SYSTOLIC AND DIASTOLIC HEART FAILURE, NYHA CLASS 4 (HCC): Status: ACTIVE | Noted: 2022-01-01

## 2022-07-18 PROBLEM — E87.70 FLUID OVERLOAD: Status: ACTIVE | Noted: 2022-01-01

## 2022-09-29 PROBLEM — I77.9 CAROTID ARTERY DISEASE (HCC): Status: ACTIVE | Noted: 2022-01-01

## 2022-09-29 NOTE — PROGRESS NOTES
1. Have you been to the ER, urgent care clinic since your last visit? Yes July      Hospitalized since your last visit? No     2. Have you seen or consulted any other health care providers outside of the [address] [place] [address] since your last visit? Include any pap smears or colon screening.   No

## 2022-09-29 NOTE — PROGRESS NOTES
Vascular Surgery Office Visit    Jalen Vazquez  Chief Complaint   Patient presents with    New Patient    Carotid Artery Stenosis       History:  77 y/o F with Alzheimer dementia, brought by her daughter to clinic to discuss recent carotid imaging results. The pt and her daughter recently relocated to [place] from [place]. Her daughter has been her mother's caretaker for quite some time. During the process of establishing care here in [place], the pt underwent carotid imaging, which demonstrated R ICA occlusion. Per her daughter, this has been a chronic finding since the pt experienced a stroke several years ago. No new symptoms of CVA/TIA/AF. The patient has some residual aphasia from the stroke, and her daughter answers most of the interview questions. Past Medical History:   Diagnosis Date    A-fib [facility])     CAD (coronary artery disease)     Heart Failure    Hypertension     MI (myocardial infarction) ([facility] [address]     Osteoporosis     Stroke [facility])      Patient Active Problem List   Diagnosis Code    CHF (congestive heart failure) ([facility]) I50.9    Acute on chronic systolic and diastolic heart failure, NYHA class 4 ([facility]) I50.43    Fluid overload E87.70     Past Surgical History:   Procedure Laterality Date    HX BACK SURGERY      [place] CARDIAC SURG PROCEDURE UNLIST      stents placed at [facility]     Current Outpatient Medications   Medication Sig Dispense Refill    ramipriL (ALTACE) 1.25 mg capsule Take 1 Capsule by mouth in the morning. 30 Capsule 0    furosemide (LASIX) 40 mg tablet Take 1 Tablet by mouth two (2) times a day. Please dispense 40 mg BID 60 tablet for 30 days 60 Tablet 0    midodrine (PROAMATINE) 2.5 mg tablet Take 2.5 mg by mouth two (2) times a day. memantine (NAMENDA) 10 mg tablet Take 10 mg by mouth two (2) times a day. ascorbic acid, vitamin C, (VITAMIN C) 250 mg tablet Take 250 mg by mouth daily. fluticasone furoate-vilanteroL (Breo Ellipta) 100-25 mcg/dose inhaler Take 1 Puff by inhalation daily. calcium-cholecalciferol, d3, 600-125 mg-unit tab Take  by mouth.      cyanocobalamin 1,000 mcg tablet Take 1,000 mcg by mouth daily. ferrous sulfate 325 mg (65 mg iron) tablet Take 325 mg by mouth Daily (before breakfast). atorvastatin (LIPITOR) 80 mg tablet Take 80 mg by mouth nightly. clonazePAM (KLONOPIN) 0.5 mg tablet Take 0.5 mg by mouth nightly as needed. clopidogrel (PLAVIX) 75 mg tab Take 75 mg by mouth daily. pantoprazole (PROTONIX) 40 mg tablet Take 40 mg by mouth daily. escitalopram oxalate (LEXAPRO) 5 mg tablet Take 15 mg by mouth daily. aspirin delayed-release 81 mg tablet Take  by mouth daily. metoprolol (LOPRESSOR) 100 mg tablet Take 25 mg by mouth three (3) times daily. No Known Allergies   Social History     Tobacco Use    Smoking status: Former    Smokeless tobacco: Never   Substance Use Topics    Alcohol use: No    Drug use: No     History reviewed. No pertinent family history. Review of Systems    General: negative for fever   Eyes: negative for vision loss   HENT: negative for cold symptoms   Respiratory negative for shortness of breath   Cardiac: negative for chest pain   Vascular negative for foot pain at night    Gastrointestinal: negative for abdominal pain   Genitourinary: negative for dysuria    Endocrine: negative for excessive thirst   Skin: negative for rash   Neurological: negative for paralysis   Psychiatric: negative for depression        Physical Exam:    Visit Vitals  /80 (BP 1 Location: Right arm, BP Patient Position: Sitting)   Pulse 66   Ht 5' 3\" (1.6 m)   Wt 111 lb 15.9 oz (50.8 kg)   SpO2 97%   BMI 19.84 kg/m²        Constitutional:  Patient is well developed, well nourished, and not distressed. HEENT: Atraumatic, normocephalic, wearing a mask. Eyes:   Cunjunctivae clear, no scleral icterus  Cardiovascular:  Normal rate, regular rhythm, normal heart sounds.   Pulmonary/Chest: Effort normal and breath sounds normal.  she has no wheezes or no rales. Abdominal:   Soft, non-distended. No tenderness to palpation. Extremities:   BLE warm. Neurological:  she  is alert and oriented x3. Motor & sensory grossly intact in all 4 limbs. Psych: Appropriate mood and affect. Imaging/Studies:   Reports reviewed from [facility] carotid artery duplex 9/28/22: Occlusion of the R ICA. Plaque present of the L ICA, with < 50% stenosis. Normal flow of the L VA. Unable to visualize the R VA. Impression:  -R internal carotid artery occlusion, chronic per the pt's daughter.     -Mild disease of the L ICA. -Prior history of CVA      Plan:  Surveillance carotid duplex in 6 months  Continue plavix and atorvastatin  RTC in 6 months      Earle Claire MD  Vascular Surgeon      I spent approximately 50 minutes on this patient encounter, which includes but is not limited to performing a history and physical exam, reviewing primary care and consultant documentation, reviewing imaging/studies, and speaking with the patient and her daughter regarding my impression and plan.

## 2022-10-10 PROBLEM — J18.9 PNEUMONIA: Status: ACTIVE | Noted: 2022-01-01

## 2022-10-10 NOTE — ED NOTES
10/10/22  6:08 PM   Patient care will be transferred to Dr. Pati Cesar from Dr. Brijesh Davidson. Discussed available diagnostic results and care plan at length. Patient poor historian and arrives by EMS without any family with her. History and exam that is available seems consistent with CHF exacerbation. Patient does meet sepsis criteria but with history of CHF the 30 cc/kg bolus is contraindicated at this time. Will cover with broad-spectrum antibiotics for suspected pulmonary source. 7:32 PM  Called [facility] to check on status of radiology reads. They do not have them in their system and are working to access them now. 8:04 PM  Updated patient and her son on all results and plan. All questions answered. CONSULT NOTE:   8:18 PM  Dr. Dipak Pagan spoke with Dr. Shira Waters   Specialty: Hospitalist  Discussed pt's hx, disposition, and available diagnostic and imaging results over the telephone. Reviewed care plans. Accepts to telemetry.

## 2022-10-10 NOTE — ED PROVIDER NOTES
EMERGENCY DEPARTMENT HISTORY AND PHYSICAL EXAM      Date: 10/10/2022  Patient Name: Michael Morfin      History of Presenting Illness     Chief Complaint   Patient presents with    Shortness of Breath              Location/Duration/Severity/Modifying factors   Chief Complaint   Patient presents with    Shortness of Breath              HPI:  Michael Morfin is a 78 y.o. female with history as listed presents with a concern of shortness of breath. Patient arrives via EMS. Has a history of CHF. Patient states that she doesn't feel well. I attempted to use a  service for history but the patient was not able to give further history. She just kept pointer to her chest and intermittently saying \"mama\" and \" dont feel good\". We attempted multiple times to redirect the patient, but she was unable to do so. /    Location: unable to specify  Duration: cannot specify   Quality: cannot specify  Severity: moderate  Modifying Factors: cannot specify  Associated Symptoms:see ROS      There are no other complaints, changes, or physical findings at this time.     PCP: Farrukh Carrington MD    Current Facility-Administered Medications   Medication Dose Route Frequency Provider Last Rate Last Admin    clopidogreL (PLAVIX) tablet 75 mg  75 mg Oral DAILY Ferny Scott MD        memantine (NAMENDA) tablet 10 mg  10 mg Oral BID Ferny Scott MD        furosemide (LASIX) tablet 40 mg  40 mg Oral DAILY Ferny Scott MD        atorvastatin (LIPITOR) tablet 80 mg  80 mg Oral QHS Robyn Scott MD   80 mg at 10/11/22 0339    pantoprazole (PROTONIX) tablet 40 mg  40 mg Oral DAILY Ferny Scott MD        metoprolol tartrate (LOPRESSOR) tablet 25 mg  25 mg Oral Q8H Traci Scott MD   25 mg at 10/11/22 3128    escitalopram oxalate (LEXAPRO) tablet 15 mg  15 mg Oral DAILY Ferny Scott MD        aspirin chewable tablet 81 mg  81 mg Oral DAILY Ferny Scott MD        cefTRIAXone (ROCEPHIN) 2 g in sterile water (preservative free) 20 mL IV syringe  2 g IntraVENous Q24H Meghna Scott MD   2 g at 10/10/22 1843    doxycycline (VIBRAMYCIN) 100 mg in 0.9% sodium chloride (MBP/ADV) 100 mL MBP  100 mg IntraVENous Q12H Robyn Scott  mL/hr at 10/10/22 1858 100 mg at 10/10/22 1858     Current Outpatient Medications   Medication Sig Dispense Refill    ramipriL (ALTACE) 1.25 mg capsule Take 1 Capsule by mouth in the morning. 30 Capsule 0    furosemide (LASIX) 40 mg tablet Take 1 Tablet by mouth two (2) times a day. Please dispense 40 mg BID 60 tablet for 30 days 60 Tablet 0    midodrine (PROAMATINE) 2.5 mg tablet Take 2.5 mg by mouth two (2) times a day. memantine (NAMENDA) 10 mg tablet Take 10 mg by mouth two (2) times a day. ascorbic acid, vitamin C, (VITAMIN C) 250 mg tablet Take 250 mg by mouth daily. fluticasone furoate-vilanteroL (Breo Ellipta) 100-25 mcg/dose inhaler Take 1 Puff by inhalation daily. calcium-cholecalciferol, d3, 600-125 mg-unit tab Take  by mouth.      cyanocobalamin 1,000 mcg tablet Take 1,000 mcg by mouth daily. ferrous sulfate 325 mg (65 mg iron) tablet Take 325 mg by mouth Daily (before breakfast). atorvastatin (LIPITOR) 80 mg tablet Take 80 mg by mouth nightly. clonazePAM (KLONOPIN) 0.5 mg tablet Take 0.5 mg by mouth nightly as needed. clopidogrel (PLAVIX) 75 mg tab Take 75 mg by mouth daily. pantoprazole (PROTONIX) 40 mg tablet Take 40 mg by mouth daily. escitalopram oxalate (LEXAPRO) 5 mg tablet Take 15 mg by mouth daily. aspirin delayed-release 81 mg tablet Take  by mouth daily. metoprolol (LOPRESSOR) 100 mg tablet Take 25 mg by mouth three (3) times daily.          Past History     Past Medical History:  Past Medical History:   Diagnosis Date    A-fib [facility])     CAD (coronary artery disease)     Heart Failure    Hypertension     MI (myocardial infarction) ([facility] [address]     Osteoporosis     Stroke [facility])        Past Surgical History:  Past Surgical History: Procedure Laterality Date    HX BACK SURGERY      CO CARDIAC SURG PROCEDURE UNLIST      stents placed at [facility]       Family History:  No family history on file. Social History:  Social History     Tobacco Use    Smoking status: Former    Smokeless tobacco: Never   Substance Use Topics    Alcohol use: No    Drug use: No       Allergies:  No Known Allergies      Review of Systems     Review of Systems   Unable to perform ROS: Mental status change     Physical Exam     Physical Exam  Constitutional:       Appearance: Normal appearance. HENT:      Head: Normocephalic and atraumatic. Right Ear: External ear normal.      Left Ear: External ear normal.      Nose: No congestion or rhinorrhea. Mouth/Throat:      Pharynx: No oropharyngeal exudate or posterior oropharyngeal erythema. Cardiovascular:      Rate and Rhythm: Normal rate and regular rhythm. Pulmonary:      Effort: Pulmonary effort is normal. Tachypnea present. Breath sounds: Examination of the right-lower field reveals rhonchi. Examination of the left-lower field reveals rhonchi. Rhonchi present. Chest:      Chest wall: No mass or deformity. Abdominal:      General: Abdomen is flat. Palpations: Abdomen is soft. Musculoskeletal:         General: No swelling or tenderness. Normal range of motion. Cervical back: Normal range of motion and neck supple. Right lower leg: Edema present. Left lower leg: Edema present. Comments: Less edema of the bilateral lower extremities. Skin:     Coloration: Skin is not pale. Neurological:      Mental Status: She is alert. Mental status is at baseline. She is disoriented. Cranial Nerves: No cranial nerve deficit. Motor: No weakness. Psychiatric:         Mood and Affect: Mood is anxious.        Lab and Diagnostic Study Results     Labs -  Recent Results (from the past 24 hour(s))   EKG, 12 LEAD, INITIAL    Collection Time: 10/10/22  5:37 PM   Result Value Ref Range Ventricular Rate 112 BPM    Atrial Rate 133 BPM    QRS Duration 68 ms    Q-T Interval 388 ms    QTC Calculation (Bezet) 529 ms    Calculated R Axis -25 degrees    Calculated T Axis -102 degrees    Diagnosis       Atrial fibrillation with rapid ventricular response  Low voltage QRS  Cannot rule out Anterior infarct , age undetermined  ST & T wave abnormality, consider inferolateral ischemia or digitalis effect  Abnormal ECG  When compared with ECG of 29-SEP-2019 09:37,  No significant change was found     CULTURE, BLOOD    Collection Time: 10/10/22  5:38 PM    Specimen: Blood   Result Value Ref Range    Special Requests: NO SPECIAL REQUESTS      Culture result: NO GROWTH AFTER 11 HOURS     CBC WITH AUTOMATED DIFF    Collection Time: 10/10/22  5:41 PM   Result Value Ref Range    WBC 9.0 4.6 - 13.2 K/uL    RBC 3.34 (L) 4.20 - 5.30 M/uL    HGB 11.0 (L) 12.0 - 16.0 g/dL    HCT 33.4 (L) 35.0 - 45.0 %    .0 78.0 - 100.0 FL    MCH 32.9 24.0 - 34.0 PG    MCHC 32.9 31.0 - 37.0 g/dL    RDW 21.0 (H) 11.6 - 14.5 %    PLATELET 884 278 - 444 K/uL    MPV 9.5 9.2 - 11.8 FL    NRBC 0.0 0  WBC    ABSOLUTE NRBC 0.00 0.00 - 0.01 K/uL    NEUTROPHILS 75 (H) 40 - 73 %    LYMPHOCYTES 16 (L) 21 - 52 %    MONOCYTES 7 3 - 10 %    EOSINOPHILS 1 0 - 5 %    BASOPHILS 1 0 - 2 %    IMMATURE GRANULOCYTES 0 0.0 - 0.5 %    ABS. NEUTROPHILS 6.8 1.8 - 8.0 K/UL    ABS. LYMPHOCYTES 1.4 0.9 - 3.6 K/UL    ABS. MONOCYTES 0.6 0.05 - 1.2 K/UL    ABS. EOSINOPHILS 0.1 0.0 - 0.4 K/UL    ABS. BASOPHILS 0.1 0.0 - 0.1 K/UL    ABS. IMM.  GRANS. 0.0 0.00 - 0.04 K/UL    DF AUTOMATED      PLATELET COMMENTS ADEQUATE PLATELETS      RBC COMMENTS ANISOCYTOSIS  2+        RBC COMMENTS MILKA CELLS  2+        RBC COMMENTS POIKILOCYTOSIS  2+       METABOLIC PANEL, COMPREHENSIVE    Collection Time: 10/10/22  5:41 PM   Result Value Ref Range    Sodium 140 136 - 145 mmol/L    Potassium 4.5 3.5 - 5.5 mmol/L    Chloride 108 100 - 111 mmol/L    CO2 22 21 - 32 mmol/L Anion gap 10 3.0 - 18 mmol/L    Glucose 194 (H) 74 - 99 mg/dL    BUN 29 (H) 7.0 - 18 MG/DL    Creatinine 1.30 0.6 - 1.3 MG/DL    BUN/Creatinine ratio 22 (H) 12 - 20      eGFR 41 (L) >60 ml/min/1.73m2    Calcium 8.1 (L) 8.5 - 10.1 MG/DL    Bilirubin, total 0.8 0.2 - 1.0 MG/DL    ALT (SGPT) 34 13 - 56 U/L    AST (SGOT) 33 10 - 38 U/L    Alk.  phosphatase 99 45 - 117 U/L    Protein, total 6.2 (L) 6.4 - 8.2 g/dL    Albumin 3.1 (L) 3.4 - 5.0 g/dL    Globulin 3.1 2.0 - 4.0 g/dL    A-G Ratio 1.0 0.8 - 1.7     NT-PRO BNP    Collection Time: 10/10/22  5:41 PM   Result Value Ref Range    NT pro-BNP 13,609 (H) 0 - 1,800 PG/ML   TROPONIN-HIGH SENSITIVITY    Collection Time: 10/10/22  5:41 PM   Result Value Ref Range    Troponin-High Sensitivity 14 0 - 54 ng/L   CULTURE, BLOOD    Collection Time: 10/10/22  5:41 PM    Specimen: Blood   Result Value Ref Range    Special Requests: NO SPECIAL REQUESTS      Culture result: NO GROWTH AFTER 11 HOURS     MAGNESIUM    Collection Time: 10/10/22  5:41 PM   Result Value Ref Range    Magnesium 1.8 1.6 - 2.6 mg/dL   BLOOD GAS,CHEM8,LACTIC ACID POC    Collection Time: 10/10/22  5:49 PM   Result Value Ref Range    Calcium, ionized (POC) 0.97 (L) 1.12 - 1.32 mmol/L    Base deficit (POC) 2.2 mmol/L    HCO3 (POC) 20.8 (L) 22 - 26 MMOL/L    CO2, POC 21 19 - 24 MMOL/L    O2 SAT 73 %    Sample source VENOUS BLOOD      Performed by Dhaval Garcia     Sodium (POC) 141 136 - 145 mmol/L    Potassium (POC) 5.5 (H) 3.5 - 5.1 mmol/L    Glucose (POC) 181 (H) 65 - 100 mg/dL    Creatinine (POC) 1.27 0.6 - 1.3 mg/dL    Lactic Acid (POC) 3.48 (HH) 0.40 - 2.00 mmol/L    Chloride (POC) 108 (H) 98 - 107 mmol/L    Anion gap, POC 13 10 - 20      pH, venous (POC) 7.46 (H) 7.32 - 7.42      pCO2, venous (POC) 29.6 (L) 41 - 51 MMHG    pO2, venous (POC) 36 25 - 40 mmHg   BLOOD GAS, ARTERIAL POC    Collection Time: 10/10/22  5:56 PM   Result Value Ref Range    pH (POC) 7.58 (HH) 7.35 - 7.45      pCO2 (POC) 19.8 (L) 35.0 - 45.0 MMHG    pO2 (POC) 70 (L) 80 - 100 MMHG    HCO3 (POC) 18.3 (L) 22 - 26 MMOL/L    sO2 (POC) 96.6 92 - 97 %    Base deficit (POC) 2.0 mmol/L    Specimen type (POC) ARTERIAL      Performed by Nathalia Traore W/ RFLX MICROSCOPIC    Collection Time: 10/10/22  7:23 PM   Result Value Ref Range    Color YELLOW      Appearance CLOUDY      Specific gravity 1.010 1.005 - 1.030      pH (UA) 7.0 5.0 - 8.0      Protein Negative NEG mg/dL    Glucose Negative NEG mg/dL    Ketone Negative NEG mg/dL    Bilirubin Negative NEG      Blood Negative NEG      Urobilinogen 2.0 (H) 0.2 - 1.0 EU/dL    Nitrites Negative NEG      Leukocyte Esterase TRACE (A) NEG     URINE MICROSCOPIC ONLY    Collection Time: 10/10/22  7:23 PM   Result Value Ref Range    WBC 0 to 3 0 - 4 /hpf    RBC NONE 0 - 5 /hpf    Epithelial cells FEW 0 - 5 /lpf    Bacteria 2+ (A) NEG /hpf    Amorphous Crystals 1+ (A) NEG   POC LACTIC ACID    Collection Time: 10/10/22  7:46 PM   Result Value Ref Range    Lactic Acid (POC) 2.13 (HH) 0.40 - 2.00 mmol/L   COVID-19 WITH INFLUENZA A/B    Collection Time: 10/10/22  9:03 PM   Result Value Ref Range    SARS-CoV-2 by PCR Not detected NOTD      Influenza A by PCR Not detected NOTD      Influenza B by PCR Not detected NOTD           Radiologic Studies -   CT HEAD WO CONT   Final Result      No acute finding. Atrophy. Chronic left watershed and lacunar infarcts      XR CHEST PORT   Final Result      Right sided infiltrates. Small right effusion. Cardiomegaly.             Procedures and Critical Care       Performed by: Brandon Burleson MD    EKG    Date/Time: 10/10/2022 5:53 PM  Performed by: Sharon Santana MD  Authorized by: Sharon Santana MD     Previous ECG:     Previous ECG:  Compared to current  Interpretation:     Interpretation: abnormal    Rate:     ECG rate assessment: tachycardic    Rhythm:     Rhythm: atrial fibrillation    Ectopy:     Ectopy: none    QRS:     QRS axis:  Normal    QRS intervals: Normal    QRS conduction: normal    ST segments:     ST segments:  Normal  T waves:     T waves: inverted      Details:  V2,V3  Q waves:     Abnormal Q-waves: not present    Critical Care  Performed by: Josefa Underwood MD  Authorized by: Josefa Underwood MD     Critical care provider statement:     Critical care time (minutes):  35    Critical care time was exclusive of:  Separately billable procedures and treating other patients    Critical care was necessary to treat or prevent imminent or life-threatening deterioration of the following conditions:  Sepsis    Critical care was time spent personally by me on the following activities:  Examination of patient, obtaining history from patient or surrogate, ordering and performing treatments and interventions, ordering and review of laboratory studies, ordering and review of radiographic studies, re-evaluation of patient's condition, review of old charts, discussions with consultants and evaluation of patient's response to treatment    I assumed direction of critical care for this patient from another provider in my specialty: yes    Critical Care  Performed by: Josefa Underwood MD  Authorized by: Josefa Underwood MD     Comments:      Despite having sepsis, I feel that 30 mL/kg of crystalloid fluid would harm the patient due to a history of CHF with volume overload. Therefore I am ordering None to replace the 30 ml/kg as the target volume Zackary Mckay MD 9:11 AM            Zackary Mckay MD    Medical Decision Making and ED Course   - I am the first and primary provider for this patient AND AM THE PRIMARY PROVIDER OF RECORD. - I reviewed the vital signs, available nursing notes, past medical history, past surgical history, family history and social history. - Initial assessment performed. The patients presenting problems have been discussed, and the staff are in agreement with the care plan formulated and outlined with them.   I have encouraged them to ask questions as they arise throughout their visit. Vital Signs-Reviewed the patient's vital signs. Patient Vitals for the past 12 hrs:   Pulse Resp BP SpO2   10/11/22 0820 97 25 -- 94 %   10/11/22 0805 100 25 (!) 104/55 90 %   10/11/22 0705 99 23 112/64 93 %   10/11/22 0605 (!) 104 21 -- 98 %   10/11/22 0600 (!) 102 26 118/75 96 %   10/11/22 0422 (!) 112 28 -- 96 %   10/11/22 0400 (!) 114 30 123/83 97 %   10/11/22 0306 91 22 -- 90 %   10/11/22 0300 91 23 123/78 --   10/11/22 0259 97 22 -- 92 %   10/11/22 0224 100 27 -- 91 %   10/11/22 0214 97 23 -- 93 %   10/11/22 0200 92 21 118/62 --   10/11/22 0130 96 26 -- 94 %   10/11/22 0129 99 21 -- 93 %   10/11/22 0128 97 20 -- 93 %   10/11/22 0000 95 21 119/77 --   10/10/22 2357 95 20 -- 95 %   10/10/22 2325 94 22 -- 93 %   10/10/22 2300 97 23 124/64 95 %   10/10/22 2200 92 22 115/76 93 %   10/10/22 2130 (!) 102 26 125/74 95 %         Provider Notes (Medical Decision Making):     MDM  Number of Diagnoses or Management Options  Acute on chronic systolic congestive heart failure (HCC)  Community acquired pneumonia of right lower lobe of lung  Hypoxia  Diagnosis management comments: Assessment/Differential Diagnosis:  Consider R/O CVA, TIA, overdose, intoxication, seizure, hypoglycemia, sepsis, UTI, pneumonia, intracranial hemorrhage, subdural     ED Course/Medical Decision Making:  Pt is a 78 y.o. female who is concerning for change in mental status, unclear etiology, will check labs, CXR, UA, monitor closely. Will likely require admission when w/u is complete. Suspect that the patient has CHF but is also having some difficulty with word finding. Will require full evaluation. With labs, imaging. Meeting sepsis criteria. 5:55 PM : Pt care transferred to Dr. Catarina Tran  ,ED provider. History of patient complaint(s), available diagnostic reports and current treatment plan has been discussed thoroughly. Bedside rounding on patient occured : yes .   Intended disposition of patient : Admission  Pending diagnostics reports and/or labs (please list): See EMR    Dr. Jose Ramon Nuñez assistance in completion of this plan is greatly appreciated but it should be noted that I will be the provider of record for this patient. Amount and/or Complexity of Data Reviewed  Clinical lab tests: ordered and reviewed  Tests in the radiology section of CPT®: reviewed and ordered  Tests in the medicine section of CPT®: ordered and reviewed           ED Course:          ------------------------------------------------------------------------------------------------------------        Consultations:       Consultations:       Disposition         Admitted      Diagnosis     Clinical Impression:   1. Hypoxia    2. Community acquired pneumonia of right lower lobe of lung    3.  Acute on chronic systolic congestive heart failure (HCC)        Attestations:    Moiz Grewal MD

## 2022-10-10 NOTE — ED NOTES
Son states that pt has aphasia after having a stroke almost a year ago.  States she alternates between [address] [place] [address] and [facility] [place]

## 2022-10-10 NOTE — ED NOTES
Introduced self and pt and son at bedside. Pt is alert to self and situation. Pt is resting comfortably with lights dimmed. Shan Felling in place and urine sent. Will continue to monitor.

## 2022-10-11 PROBLEM — N39.0 UTI (URINARY TRACT INFECTION): Status: ACTIVE | Noted: 2022-01-01

## 2022-10-11 NOTE — ROUTINE PROCESS
TRANSFER - OUT REPORT:    Verbal report given to Chandu Warner RN(name) on Gladys Cruz  being transferred to [facility] room 208(unit) for routine progression of care       Report consisted of patients Situation, Background, Assessment and   Recommendations(SBAR). Information from the following report(s) SBAR, ED Summary, and MAR was reviewed with the receiving nurse. Lines:   Peripheral IV 10/10/22 Right Wrist (Active)   Site Assessment Clean, dry, & intact 10/10/22 1835   Phlebitis Assessment 0 10/10/22 1835   Infiltration Assessment 0 10/10/22 1835   Dressing Status Clean, dry, & intact 10/10/22 1835   Hub Color/Line Status Blue 10/10/22 1835   Action Taken Blood drawn 10/10/22 1835        Opportunity for questions and clarification was provided.       Patient transported with:   Monitor  O2 @ 4 liters  IV drip

## 2022-10-11 NOTE — PROGRESS NOTES
10/11/22 1910   Oxygen Therapy   O2 Sat (%) 90 %   Pulse via Oximetry 126 beats per minute   O2 Device Heated; Hi flow nasal cannula  (opti)   O2 Flow Rate (L/min) 50 l/min   O2 Temperature 87.8 °F (31 °C)   FIO2 (%) 100 %   Received pt on NRB/mask. Pt switched to HFNC 50L at 100%, RR 40+, labored and tachypneic. Pt Spo2 88-89% with no improvement on high flow.

## 2022-10-11 NOTE — PROGRESS NOTES
1640 Patient started complaining of shortness of breath and tachycardia. When assessed pt pulse ox 83 on 4 liters. Heart rate afib rate 140's. Dr. Rich Weiss paged. Orders to give Lopressor IV 5mg. Lopressor given. Will continue to monitor patient.   1725 pt heart rate 114 b/p 123/77 spo2 91 6L

## 2022-10-11 NOTE — ED NOTES
Right hip10/11/22  4:51 AM  Notified that patient is tachycardic. When I first checked on her in the room she is sleeping comfortably. She occasionally wakes up and becomes agitated but then falls back asleep. No respiratory distress and satting well on room air. We will continue to monitor closely, patient is still awaiting inpatient bed.    5:02 AM  Patient more agitated now. Patient has aphasia from prior stroke but is able to answer yes or no questions clearly. She denies any pain, hunger, trouble breathing. When asked if she like us to call her family she indicates she has. Nurse at bedside calling the numbers listed in patient's chart to try to reach her children. Continuing to provide supportive and comforting care to patient. 5:19 AM  Nurse was able to reach patient's son who was able to speak with patient. He reports normally she takes clonazepam at night and he suspects that is why she is more agitated now. Attempted to order patient's home clonazepam dose but it has not stopped here in this emergency department. We will substitute with p.o.  Ativan

## 2022-10-11 NOTE — ED NOTES
Assumed care of pt. Report received from CAROLIN Hurley, [facility].

## 2022-10-11 NOTE — PROGRESS NOTES
Pt received from ED, pt is a/ox2  C/o pain to right arm where IV is   Skin assessment completed, pt has blanchable redness to sacrum, 3+ edema noted to bilateral lower extremities. Blanchable redness noted to bilateral heels   Old scar/scratch noted to right lower leg. Bed is in lowest position, call bell is within reach. Will continue to monitor.

## 2022-10-12 NOTE — PROGRESS NOTES
10/11/22 1920   Oxygen Therapy   O2 Sat (%) 91 %   Pulse via Oximetry 137 beats per minute   O2 Device BIPAP   FIO2 (%) 100 %   Respiratory   Respiratory (WDL) X   Respiratory Pattern Tachypneic;Labored   Chest/Tracheal Assessment Chest expansion, symmetrical   Breath Sounds Bilateral Crackles    CPAP/BIPAP   CPAP/BIPAP Start/Stop On   Device Mode BIPAP;S/T   $$ Bipap Daily Yes   Mask Type and Size Full face;Small   Skin Condition intact   PIP Observed 16 cm H20   IPAP (cm H2O) 14 cm H2O   EPAP (cm H2O) 8 cm H2O   Inspiratory Time (sec) 1 seconds   Vt Spont (ml) 564 ml   Ve Observed (l/min) 20.6 l/min   Backup Rate 8   Total RR (Spontaneous) 50 breaths per minute   Insp Rise Time (sec) 1   Leak (Estimated) 32 L/min   Pt's Home Machine No   Biomedical Check Performed Yes   Settings Verified Yes   Alarm Settings   High Pressure 40   Low Pressure 8   Apnea 20   Low Ve 3   High Rate 40   Low Rate 8   Pulmonary Toilet   Pulmonary Toilet H. O.B elevated   Pt switched from HFNC to BIPAP. RT will continue to monitor respiratory status.

## 2022-10-12 NOTE — PROGRESS NOTES
Kwaku and analyzed blood gas results in chart. Informed MD of critical lactic acid result. Adjusted settings on ventilator, Increased PEEP to 12 decreased PC to 10.

## 2022-10-12 NOTE — CONSULTS
[facility]  Pulmonary, Critical Care, and Sleep Medicine    Name: Ana Cristina Martin MRN: [id number]   : 1940 Hospital: [address] [place] [address]   Date: 10/12/2022        Critical Care Consult      IMPRESSION:   Acute hypoxic respiratory failure requiring mechanical ventilation  CHF exacerbation - combined systolic and diastolic dysfunction, last EF  2022 was 20-25%  Acute pulmonary edema  SIRS:  no current source of sepsis  Lactic acidosis  COPD  CAD - stents placed 2021  A-fib with RVR  Shock:  Cardiogenic secondary to medications including CCB and BB  Hyperlipidemia  UTI  H/o MI  H/o CVA - aphasia  H/o GERD  H/o anxiety     Patient Active Problem List   Diagnosis Code    CHF (congestive heart failure) ([facility]) I50.9    Acute on chronic systolic and diastolic heart failure, NYHA class 4 ([facility]) I50.43    Fluid overload E87.70    Carotid artery disease ([facility]) I77.9    Pneumonia J18.9    UTI (urinary tract infection) N39.0        RECOMMENDATIONS:   Neuro: Titrate sedation to RASS of 0 to -1. PRN for breakthrough sedation needs. H/o aphasia following CVA. Pulm: Titrate FiO2 for goal SPO2> 90%,VAP prevention bundle. Daily sedation holiday and assessment for weaning with SBT as tolerated. PRN duonebs. Aspiration precautions, Keep HOB >30 degrees  CVS : Continuous cardiac monitoring, titrate levophed to maintain MAP >65mmHg, Check cardiac panel, follow up on ECHO. Heparin gtt for active A-fib. Lasix challenge with 40mg, reevl UOP. Fluids: no fluid resuscitation indicated, patient in fluid overload  GI: SUP, TF. Renal:  Trend Renal indices, Strict Is/Os, Valverde. Hem/Onc: Monitor for s/o active bleeding. pTT daily while on heparin. I/D: Follow up on resp viral panel, Bcx, sputum Cx. Antibiotics: Zosyn, Vanco. Trend WBCs and temperature curve. Endocrine: Q6 glucoses, SSI. Metabolic:  Daily BMP, mag, phos. Trend lytes, replace as needed.    Musc/Skin: no acute issues, wound care  OB/GYN: no concerns  Full Code  Discussed during interdisciplinary rounds. Best practice : APPLICABLE TO PATIENT    Glycemic control  IHI ICU bundles: Villeda Bundle Followed and Vent Bundle Followed, Vent Day 10/12     [facility] Vent patients-    VAP bundle-Leake tube to suction at 20-30 cm Hg, Maintain Leake tube with 5-10ml air every 4 hours, Routine oral care every 4 hours, Elevation of head > 45 degree, Daily sedation holiday and SBT evaluation starting at 6.00am. and AR[facility] network Guidelines: Lung protective strategy and Plateau  Pressure goal < 30 cm H2O goals, Oxygenation Goals PaO2 55-80 mm Hg or SaO2 88-95%, PH goal 7.30-7.45  Sress ulcer prophylaxis. Protonix  DVT prophylaxis. Heparin gtt  Need for Lines, villeda assessed. Palliative care evaluation. Restraints need. Restraint evaluation     I have reevaluated the patient after initiation of intervention. The patient is comfortable, uninjured, and is not posing a risk to themselves, staff or others. The restraints have been tolerated well. The patient's current medical and behavioral conditions that warrant the use intervention include danger to self and Interference with medical equipment or treatment. Restraint or seclusion will be discontinued at the earliest possible time, regardless of the scheduled expiration of the order. Based on my evaluation, restraints will be continued: NO          Subjective/History: This patient has been seen and evaluated at the request of Dr. Nabil Hunt for acute hypoxic respiratory failure  . 10/12/22    Patient is a 78 y.o. female with pmh MI, CVA, systolic and diastolic CHF, COPD, A-fib, CAD admitted to [facility] on 10/10/22 via EMS c/o SOB and pedal edema. She was found to be hypoxic in CHF exacerbation and dx of CAP, started on ABX and admitted. Per chart review, on 10/11 rapid response was called for acute respiratory distress with O2 sats 70s to low 80s, on NC and NRB.  With no improvement in oxygenation she was switched to HFNC then BIPAP. The morning of 10/12 she was found to be tachypneic with  respiratory alkalosis and worsening SpO2. She was intubated and transferred to ICU. Since ICU transfer, she has been in A-fib and heparin gtt was started. Hypotensive requiring Levophed. In fluid overload 2/2 CHF exacerbation, received a 40mg of Lasix. Resp viral panel pending. The patient is critically ill and can not provide additional history due to Ventilated. Past Medical History:   Diagnosis Date    A-fib [facility])     CAD (coronary artery disease)     Heart Failure    Chronic systolic CHF (congestive heart failure) ([facility])     LVEF 20-25% & Abnormal Diastolic Function by TTE on 6/03/2022    Hypertension     MI (myocardial infarction) ([facility] [address]     Osteoporosis     Stroke [facility])         Past Surgical History:   Procedure Laterality Date    HX BACK SURGERY      AK CARDIAC SURG PROCEDURE UNLIST      stents placed at [facility]        Prior to Admission medications    Medication Sig Start Date End Date Taking? Authorizing Provider   ramipriL (ALTACE) 1.25 mg capsule Take 1 Capsule by mouth in the morning. 7/22/22   Clay Russell MD   furosemide (LASIX) 40 mg tablet Take 1 Tablet by mouth two (2) times a day. Please dispense 40 mg BID 60 tablet for 30 days 7/21/22   Clay Russell MD   midodrine (PROAMATINE) 2.5 mg tablet Take 2.5 mg by mouth two (2) times a day. Provider, Historical   memantine (NAMENDA) 10 mg tablet Take 10 mg by mouth two (2) times a day. Provider, Historical   ascorbic acid, vitamin C, (VITAMIN C) 250 mg tablet Take 250 mg by mouth daily. Billy Wiclox MD   fluticasone furoate-vilanteroL (Breo Ellipta) 100-25 mcg/dose inhaler Take 1 Puff by inhalation daily. Billy Wilcox MD   calcium-cholecalciferol, d3, 600-125 mg-unit tab Take  by mouth. Billy Wilcox MD   cyanocobalamin 1,000 mcg tablet Take 1,000 mcg by mouth daily.     Billy Wilcox MD   ferrous sulfate 325 mg (65 mg iron) tablet Take 325 mg by mouth Daily (before breakfast). Billy Wilcox MD   atorvastatin (LIPITOR) 80 mg tablet Take 80 mg by mouth nightly. Billy Wilcox MD   clonazePAM (KLONOPIN) 0.5 mg tablet Take 0.5 mg by mouth nightly as needed. Billy Wilcox MD   clopidogrel (PLAVIX) 75 mg tab Take 75 mg by mouth daily. Billy Wilcox MD   pantoprazole (PROTONIX) 40 mg tablet Take 40 mg by mouth daily. Billy Wilcox MD   escitalopram oxalate (LEXAPRO) 5 mg tablet Take 15 mg by mouth daily. Billy Wilcox MD   aspirin delayed-release 81 mg tablet Take  by mouth daily. Billy Wilcox MD   metoprolol (LOPRESSOR) 100 mg tablet Take 25 mg by mouth three (3) times daily.     Billy Wilcox MD       Current Facility-Administered Medications   Medication Dose Route Frequency    chlorhexidine (PERIDEX) 0.12 % mouthwash 10 mL  10 mL Oral Q12H    fentaNYL (PF) 1,500 mcg/30 mL (50 mcg/mL) infusion  0-200 mcg/hr IntraVENous TITRATE    insulin glargine (LANTUS) injection 4 Units  4 Units SubCUTAneous DAILY    insulin lispro (HUMALOG) injection   SubCUTAneous Q6H    dexmedeTOMidine in 0.9 % NaCl (PRECEDEX) 400 mcg/100 mL (4 mcg/mL) infusion soln  0.1-1.5 mcg/kg/hr IntraVENous TITRATE    furosemide (LASIX) injection 40 mg  40 mg IntraVENous ONCE    heparin (porcine) injection 5,000 Units  5,000 Units SubCUTAneous Q8H    magnesium sulfate 2 g/50 ml IVPB (premix or compounded)  2 g IntraVENous ONCE    vancomycin (VANCOCIN) 750 mg in 0.9% sodium chloride 250 mL (VIAL-MATE)  750 mg IntraVENous ONCE    aspirin delayed-release tablet 81 mg  81 mg Oral DAILY    atorvastatin (LIPITOR) tablet 80 mg  80 mg Oral QHS    [Held by provider] clopidogreL (PLAVIX) tablet 75 mg  75 mg Oral DAILY    cyanocobalamin tablet 1,000 mcg  1,000 mcg Oral DAILY    escitalopram oxalate (LEXAPRO) tablet 15 mg  15 mg Oral DAILY    ferrous sulfate tablet 325 mg  325 mg Oral ACB    memantine (NAMENDA) tablet 10 mg  10 mg Oral BID    [Held by provider] metoprolol tartrate (LOPRESSOR) tablet 25 mg  25 mg Oral TID    pantoprazole (PROTONIX) tablet 40 mg  40 mg Oral DAILY    [Held by provider] lisinopriL (PRINIVIL, ZESTRIL) tablet 5 mg  5 mg Oral DAILY    albuterol-ipratropium (DUO-NEB) 2.5 MG-0.5 MG/3 ML  3 mL Nebulization Q4H RT    piperacillin-tazobactam (ZOSYN) 3.375 g in 0.9% sodium chloride (MBP/ADV) 100 mL MBP  3.375 g IntraVENous Q8H    VANCOMYCIN INFORMATION NOTE   Other Rx Dosing/Monitoring    midodrine (PROAMATINE) tablet 10 mg  10 mg Oral TID       No Known Allergies     Social History     Tobacco Use    Smoking status: Former    Smokeless tobacco: Never   Substance Use Topics    Alcohol use: No        No family history on file. Review of Systems:  Review of systems not obtained due to patient factors. Objective:   Vital Signs:    Visit Vitals  /74   Pulse 89   Temp 98.6 °F (37 °C)   Resp 25   Wt 56.3 kg (124 lb 1.6 oz)   SpO2 97%   BMI 21.98 kg/m²       O2 Device: Ventilator, Endotracheal tube   O2 Flow Rate (L/min): 50 l/min   Temp (24hrs), Av.3 °F (37.4 °C), Min:98 °F (36.7 °C), Max:101.7 °F (38.7 °C)       Intake/Output:   Last shift:      No intake/output data recorded. Last 3 shifts: 10/10 1901 - 10/12 0700  In: -   Out: 1900 [Urine:1900]    Intake/Output Summary (Last 24 hours) at 10/12/2022 1131  Last data filed at 10/12/2022 0643  Gross per 24 hour   Intake --   Output 300 ml   Net -300 ml           Physical Exam:     General:  Intubated, sedated   Head:  Normocephalic, without obvious abnormality, atraumatic. Eyes:  Conjunctivae/corneas clear. PERRL,   Nose: Nares normal. Septum midline. Mucosa normal. No drainage or sinus tenderness. Neck: Supple, symmetrical, trachea midline, no adenopathy, thyroid: no enlargment/tenderness/nodules, no carotid bruit and no JVD. Lungs:   Scattered rales yamil lung fields   Chest wall:  No tenderness or deformity. Heart:  Irregular RR   Abdomen:   Soft, non-tender.  Bowel sounds normal. No masses,  No organomegaly. Extremities: Extremities normal, atraumatic, no cyanosis. 2+ pitting edema yamil LEs   Pulses: 2+ and symmetric all extremities.    Skin: Skin color, texture, turgor normal. No rashes or lesions       Neurologic: Grossly nonfocal     Devices:  ETT  OGT  Lines  Valverde    Data:     Recent Results (from the past 24 hour(s))   GLUCOSE, POC    Collection Time: 10/11/22  6:23 PM   Result Value Ref Range    Glucose (POC) 138 (H) 70 - 110 mg/dL   BLOOD GAS,LACTIC ACID, POC    Collection Time: 10/11/22  6:41 PM   Result Value Ref Range    Device: Non rebreather      FIO2 (POC) 15 %    pH (POC) 7.49 (H) 7.35 - 7.45      pCO2 (POC) 22.3 (L) 35.0 - 45.0 MMHG    pO2 (POC) 58 (L) 80 - 100 MMHG    HCO3 (POC) 17.0 (L) 22 - 26 MMOL/L    sO2 (POC) 92.7 92 - 97 %    Base deficit (POC) 4.6 mmol/L    Allens test (POC) Positive      Site LEFT RADIAL      Specimen type (POC) ARTERIAL      Performed by Scheuermann Mary     Lactic Acid (POC) 3.67 (HH) 0.40 - 2.00 mmol/L   CBC W/O DIFF    Collection Time: 10/11/22  7:56 PM   Result Value Ref Range    WBC 14.9 (H) 4.6 - 13.2 K/uL    RBC 3.34 (L) 4.20 - 5.30 M/uL    HGB 11.0 (L) 12.0 - 16.0 g/dL    HCT 34.3 (L) 35.0 - 45.0 %    .7 (H) 78.0 - 100.0 FL    MCH 32.9 24.0 - 34.0 PG    MCHC 32.1 31.0 - 37.0 g/dL    RDW 21.6 (H) 11.6 - 14.5 %    PLATELET 499 625 - 982 K/uL    MPV 9.5 9.2 - 11.8 FL    NRBC 0.0 0  WBC    ABSOLUTE NRBC 0.00 0.00 - 4.15 K/uL   METABOLIC PANEL, BASIC    Collection Time: 10/11/22  7:56 PM   Result Value Ref Range    Sodium 143 136 - 145 mmol/L    Potassium 4.4 3.5 - 5.5 mmol/L    Chloride 105 100 - 111 mmol/L    CO2 20 (L) 21 - 32 mmol/L    Anion gap 18 3.0 - 18 mmol/L    Glucose 189 (H) 74 - 99 mg/dL    BUN 26 (H) 7.0 - 18 MG/DL    Creatinine 1.15 0.6 - 1.3 MG/DL    BUN/Creatinine ratio 23 (H) 12 - 20      eGFR 47 (L) >60 ml/min/1.73m2    Calcium 8.5 8.5 - 10.1 MG/DL   LACTIC ACID    Collection Time: 10/11/22  7:56 PM   Result Value Ref Range Lactic acid 5.5 (HH) 0.4 - 2.0 MMOL/L   NT-PRO BNP    Collection Time: 10/11/22  7:56 PM   Result Value Ref Range    NT pro-BNP 20,546 (H) 0 - 1,800 PG/ML   TROPONIN-HIGH SENSITIVITY    Collection Time: 10/11/22  9:23 PM   Result Value Ref Range    Troponin-High Sensitivity 18 0 - 54 ng/L   LACTIC ACID    Collection Time: 10/11/22 11:39 PM   Result Value Ref Range    Lactic acid 2.8 (HH) 0.4 - 2.0 MMOL/L   BLOOD GAS, ARTERIAL POC    Collection Time: 10/11/22 11:57 PM   Result Value Ref Range    Device: BIPAP MASK      FIO2 (POC) 60 %    pH (POC) 7.53 (H) 7.35 - 7.45      pCO2 (POC) 23.6 (L) 35.0 - 45.0 MMHG    pO2 (POC) 65 (L) 80 - 100 MMHG    HCO3 (POC) 19.5 (L) 22 - 26 MMOL/L    sO2 (POC) 95.1 92 - 97 %    Base deficit (POC) 1.8 mmol/L    Set Rate 8 bpm    PEEP/CPAP (POC) 8 cmH2O    PIP (POC) 17      Pressure support 6 cmH2O    Allens test (POC) Positive      Total resp.  rate 50      Site LEFT RADIAL      Specimen type (POC) ARTERIAL      Performed by Lukas Fuentes    TROPONIN-HIGH SENSITIVITY    Collection Time: 10/12/22  1:39 AM   Result Value Ref Range    Troponin-High Sensitivity 32 0 - 54 ng/L   TROPONIN-HIGH SENSITIVITY    Collection Time: 10/12/22  5:50 AM   Result Value Ref Range    Troponin-High Sensitivity 44 0 - 54 ng/L   VANCOMYCIN, RANDOM    Collection Time: 10/12/22  5:50 AM   Result Value Ref Range    Vancomycin, random 7.4 5.0 - 40.0 UG/ML   GLUCOSE, POC    Collection Time: 10/12/22  6:47 AM   Result Value Ref Range    Glucose (POC) 197 (H) 70 - 110 mg/dL   BLOOD GAS, ARTERIAL POC    Collection Time: 10/12/22  7:06 AM   Result Value Ref Range    pH (POC) 7.36 7.35 - 7.45      pCO2 (POC) 31.3 (L) 35.0 - 45.0 MMHG    pO2 (POC) 54 (L) 80 - 100 MMHG    HCO3 (POC) 17.5 (L) 22 - 26 MMOL/L    sO2 (POC) 86.6 (L) 92 - 97 %    Base deficit (POC) 7.0 mmol/L    Allens test (POC) Positive      Site LEFT BRACHIAL      Specimen type (POC) ARTERIAL      Performed by Edil Barragan    PROTHROMBIN TIME + INR Collection Time: 10/12/22  9:54 AM   Result Value Ref Range    Prothrombin time 14.7 11.5 - 15.2 sec    INR 1.1 0.8 - 1.2     METABOLIC PANEL, BASIC    Collection Time: 10/12/22  9:54 AM   Result Value Ref Range    Sodium 142 136 - 145 mmol/L    Potassium 3.7 3.5 - 5.5 mmol/L    Chloride 108 100 - 111 mmol/L    CO2 20 (L) 21 - 32 mmol/L    Anion gap 14 3.0 - 18 mmol/L    Glucose 225 (H) 74 - 99 mg/dL    BUN 31 (H) 7.0 - 18 MG/DL    Creatinine 1.47 (H) 0.6 - 1.3 MG/DL    BUN/Creatinine ratio 21 (H) 12 - 20      eGFR 35 (L) >60 ml/min/1.73m2    Calcium 8.2 (L) 8.5 - 10.1 MG/DL   LACTIC ACID    Collection Time: 10/12/22  9:54 AM   Result Value Ref Range    Lactic acid 5.6 (HH) 0.4 - 2.0 MMOL/L   MAGNESIUM    Collection Time: 10/12/22  9:54 AM   Result Value Ref Range    Magnesium 1.8 1.6 - 2.6 mg/dL   PHOSPHORUS    Collection Time: 10/12/22  9:54 AM   Result Value Ref Range    Phosphorus 5.2 (H) 2.5 - 4.9 MG/DL   NT-PRO BNP    Collection Time: 10/12/22  9:54 AM   Result Value Ref Range    NT pro-BNP 40,821 (H) 0 - 1,800 PG/ML   CBC WITH AUTOMATED DIFF    Collection Time: 10/12/22  9:54 AM   Result Value Ref Range    WBC 17.5 (H) 4.6 - 13.2 K/uL    RBC 2.91 (L) 4.20 - 5.30 M/uL    HGB 9.6 (L) 12.0 - 16.0 g/dL    HCT 30.4 (L) 35.0 - 45.0 %    .5 (H) 78.0 - 100.0 FL    MCH 33.0 24.0 - 34.0 PG    MCHC 31.6 31.0 - 37.0 g/dL    RDW 21.7 (H) 11.6 - 14.5 %    PLATELET 679 319 - 418 K/uL    MPV 9.9 9.2 - 11.8 FL    NRBC 0.0 0  WBC    ABSOLUTE NRBC 0.00 0.00 - 0.01 K/uL    NEUTROPHILS 94 (H) 40 - 73 %    BAND NEUTROPHILS 2 %    LYMPHOCYTES 2 (L) 21 - 52 %    MONOCYTES 2 (L) 3 - 10 %    EOSINOPHILS 0 0 - 5 %    BASOPHILS 0 0 - 2 %    IMMATURE GRANULOCYTES 0 0.0 - 0.5 %    ABS. NEUTROPHILS 16.7 (H) 1.8 - 8.0 K/UL    ABS. LYMPHOCYTES 0.4 (L) 0.9 - 3.6 K/UL    ABS. MONOCYTES 0.4 0.05 - 1.2 K/UL    ABS. EOSINOPHILS 0.0 0.0 - 0.4 K/UL    ABS. BASOPHILS 0.0 0.0 - 0.1 K/UL    ABS. IMM.  GRANS. 0.0 0.00 - 0.04 K/UL    DF MANUAL      PLATELET COMMENTS ADEQUATE PLATELETS      RBC COMMENTS NORMOCYTIC, NORMOCHROMIC     RESPIRATORY VIRUS PANEL W/COVID-19, PCR    Collection Time: 10/12/22  9:54 AM    Specimen: Nasopharyngeal   Result Value Ref Range    Adenovirus Not detected NOTD      Coronavirus 229E Not detected NOTD      Coronavirus HKU1 Not detected NOTD      Coronavirus CVNL63 Not detected NOTD      Coronavirus OC43 Not detected NOTD      SARS-CoV-2, PCR Not detected NOTD      Metapneumovirus Not detected NOTD      Rhinovirus and Enterovirus Not detected NOTD      Influenza A Not detected NOTD      Influenza B Not detected NOTD      Parainfluenza 1 Not detected NOTD      Parainfluenza 2 Not detected NOTD      Parainfluenza 3 Not detected NOTD      Parainfluenza virus 4 Not detected NOTD      RSV by PCR Not detected NOTD      B. parapertussis, PCR Not detected NOTD      Bordetella pertussis - PCR Not detected NOTD      Chlamydophila pneumoniae DNA, QL, PCR Not detected NOTD      Mycoplasma pneumoniae DNA, QL, PCR Not detected NOTD     HEMOGLOBIN A1C WITH EAG    Collection Time: 10/12/22  9:54 AM   Result Value Ref Range    Hemoglobin A1c 5.6 4.2 - 5.6 %    Est. average glucose 114 mg/dL   GLUCOSE, POC    Collection Time: 10/12/22 10:24 AM   Result Value Ref Range    Glucose (POC) 223 (H) 70 - 110 mg/dL           Recent Labs     10/12/22  0706 10/11/22  2357 10/11/22  1841   FIO2I  --  60 15   HCO3I 17.5* 19.5* 17.0*   PCO2I 31.3* 23.6* 22.3*   PHI 7.36 7.53* 7.49*   PO2I 54* 65* 58*       Telemetry:AFIB    Imaging:  I have personally reviewed the patients radiographs and have reviewed the reports:    XR Results (most recent):  Results from Hospital Encounter encounter on 10/10/22    XR ABD PORT  1 V    Narrative  History: OG tube placement. TECHNIQUE: KUB    COMPARISON: No direct comparison. Correlation made with chest radiograph same day. FINDINGS:    Nonspecific bowel pattern. Decreased bone mineral density.  Osseous degenerative  changes. Sequela of previous vertebral body intervention. Telemetry leads noted. Multifocal airspace opacities and basilar pleural opacities partially imaged. Enlarged cardiac silhouette, unchanged. There is an enteric tube with tip and sidehole within the proximal stomach. Additional tube and/or probe noted overlying the distal esophagus. Presumed endotracheal tube partially imaged with tip towards the right mainstem  bronchus but slightly above the henna. Recommend clinical correlation and  direct imaging. Impression  Nonspecific bowel pattern. Support devices as discussed. CT Results (most recent):  Results from [place] encounter on 10/10/22    CT HEAD WO CONT    Narrative  EXAM: CT HEAD WITHOUT CONTRAST. CLINICAL HISTORY/INDICATION:  AMS    COMPARISON: None. TECHNIQUE: Routine axial images have been obtained from skull base to vertex at  5 mm thick slices. All CT scans at this facility are performed using dose  optimization technique as appropriate to a performed exam, to include automated  exposure control, adjustment of the mA and/or kV according to patient's size  (including appropriate matching for site-specific examinations), or use of  iterative reconstruction technique. FINDINGS:    There are no intra nor extra axial fluid collections. There is no hemorrhage. Periventricular and subcortical white matter hypodensities. Chronic small  watershed zone infarct at the left posterior frontal anterior parietal  junctional zone. Chronic lacunar infarct in the left basal ganglia and  subinsular cortex. The paranasal sinuses which are included on this exam are  well aerated and unremarkable. Impression  No acute finding. Atrophy.   Chronic left watershed and lacunar infarcts                     Total of 30  min critical care time spent at bedside during the course of care providing evaluation,management and care decisions and ordering appropriate treatment related to critical care problems exclusive of procedures. The reason for providing this level of medical care for this critically ill patient was due a critical illness that impaired one or more vital organ systems such that there was a high probability of imminent or life threatening deterioration in the patients condition. This care involved high complexity decision making to assess, manipulate, and support vital system functions, to treat this degree vital organ system failure and to prevent further life threatening deterioration of the patients condition. Andressa So PA-C  10/12/22  Pulmonary, Critical Care Medicine  [address] [place] [address] [facility]           Attending Note:  I saw and evaluated the patient, performing all elements of service personally. I discussed the findings, assessment and plan with the PA and agree with the PA's findings and plan as documented in the PA's note. All edits and changes made above or are mentioned in my attending note which was independently assessed as well as written. Total of 113 min critical care time spent at bedside (personally) during the course of care providing evaluation,management and care decisions and ordering appropriate treatment related to critical care problems exclusive of procedures. I performed the substantive portion of this split-shared encounter (>50% of time)  The reason for providing this level of medical care for this critically ill patient was due a critical illness that impaired one or more vital organ systems such that there was a high probability of imminent or life threatening deterioration in the patients condition. This care involved high complexity decision making to assess, manipulate, and support vital system functions, to treat this degree vital organ system failure and to prevent further life threatening deterioration of the patients condition. This time was independent of other practitioners.     77 y/o female with PMH of MI, CVA, combined systolic/diastolic CHF, a-fib, [facility] in for SOB. Pt reported associated LE swelling. Pt was found to be very hypoxic, thought to be from CHF vs CAP. Pt was given ABx and admitted. Pt eventually led to needing HFNC and then bipap, then RRT was called due to severe hypoxia and tachypnea. Pt was emergently intubated and placed on [facility] ventilation. Pt was cardizem and BB for a-fib with RVR --- these were stopped and still pt unfortunately went under cardiogenic shock. CXR shows extensive infiltrates due to pulm edema -- will attempt diuresis, failed lasix IV 40mg --- for unknown reason, hospitalist was giving PO lasix and was lower dose than home dose. Adding bumex bolus and drip, strict ins/outs. Cardiogenic shock, continue vasopressors, placed central line and concentrated to limit fluid intake. Pt also has anemia, no visible GI bleed, likely dilutional, but will continue to monitor. Pt also has elevated MCV, likely also contributing to mixed anemia. Pt also has ELY -- likely due to ATN vs cardiorenal syndrome. Acute encephalopathy -- toxic metabolic in nature, sedation as tolerated while attempting to diurese and improve oxgyenation. Replete lytes (given hypokalemia and now adding loop diuretics). Continue supportive care.     Prognosis guarded      Christiana Sauceda MD/MPH     Pulmonary, Critical Care Medicine  [facility]

## 2022-10-12 NOTE — PROGRESS NOTES
Patient developed acute respiratory distress hypoxia requiring intubation currently patient is intubated transferred to ICU entire care is now under ICU team hospitalist will resume care after patient is discharged from ICU.

## 2022-10-12 NOTE — INTERDISCIPLINARY ROUNDS
[facility]  Pulmonary, Critical Care, and Sleep Medicine  Interdisciplinary and Ventilator Weaning Rounds    Patient discussed in morning walking rounds and interdisciplinary rounds. ICU day: 10/12    Overnight events:   Admitted this morning    Assessments and best practice:  Ventilator  Ventilator day 10/12  Vent settings: PC with RR10 and Pressure of 16, PhH440, PEEP 10  VAP bundle, aim to keep peak plateau pressure 68-22KQ H2O  Weaning assessed and documented   Patient does not meet criteria for SBT. Patient is not on sedation holiday. Plan to wean with PS N/A. Sedation  Fentanyl dose upon arrival to ICU  Other pertinent drips  Caridzem  Lines noted  Villeda Catheter  Critical labs assessed  Yes  Antibiotics  Zosyn and Vancomycin  Medications reviewed  Yes  Pending imaging  none  Pending send out labs  Yes  Pending Procedures  None  Glycemic control  Stress ulcer prophylaxis. Protonix  DVT prophylaxis. SQH  Need for Lines, villeda assessed. Yes  Restraint Reevaluation     Restraints not needed      Family contact/MPOA:   Family updated     Palliative consult within 3 days of admission to ICU-  Ethics Guidance: 21 days      Daily Plans:   Villeda placement  Add Lantus 4 units    HAYDEN time 15 minutes        Kerman Gaucher, PA-C  10/12/22  Pulmonary, Critical Care Medicine  [facility]

## 2022-10-12 NOTE — PROGRESS NOTES
Physician Progress Note      Pau Forbes  CSN #:                  [id number]  :                       1940  ADMIT DATE:       10/10/2022 5:31 PM  [address] [place] DATE:  RESPONDING  PROVIDER #:        Sisto Lesch MD DESAI MD          QUERY TEXT:    Dear Hospitalist  Pt admitted with SOB. Pt noted to have  pneumonia  with   WBC  9.0 on admit  up to 14.9   after 24  hrs  . Please  document in the progress notes and discharge summary if you are evaluating and /or treating any of the following: The medical record reflects the following:  Risk Factors: advanced age  Clinical Indicators: WBC  9.0 on admit  up  to  14.9    after   24  hrs of  admit;   afebrile on admit   temp  up  to  100.8  axillary   well after 24  hrs  of admit;  HR   elevated on admit   but patient already  in afib on admit. Treatment: IV  zosyn  started on 10/11;  Vancomycin   1 dose given on 10/11. Thank you,   Lorena Muller RN   CCDS  Options provided:  -- Sepsis, not present on admission  -- pneumonia without Sepsis  -- Sepsis was ruled out  -- Other - I will add my own diagnosis  -- Disagree - Not applicable / Not valid  -- Disagree - Clinically unable to determine / Unknown  -- Refer to Clinical Documentation Reviewer    PROVIDER RESPONSE TEXT:    After further study, sepsis was ruled out for this patient.     Query created by: Mukul Riddle on 10/12/2022 7:22 AM      Electronically signed by:  Sisto Lesch MD Vernette Oiler MD 10/12/2022 12:04 PM

## 2022-10-12 NOTE — PROGRESS NOTES
Received pt from CVT stepdown intubated and ventilated with BVM. Placed on mechanical ventilator BVM at bedside.

## 2022-10-12 NOTE — PROGRESS NOTES
Patient's dentures, bracelet, and rings given to Rosemarie Rachel (son/POA) for safekeeping. Rosemarie Ramos stated that he was going to take home with him and that he would bring to his mother when she was extubated and would want/need items.

## 2022-10-12 NOTE — PROGRESS NOTES
[address] [facility] Pharmacokinetic Monitoring Service - Vancomycin    Consulting Provider: Marci Mosley   Indication: Pneumonia (CAP)  Target Concentration: Goal AUC/NIKITA 400-600 mg*hr/L  Day of Therapy: 2  Additional Antimicrobials: Piperacillin/Tazobactam    Pertinent Laboratory Values:   Temp: 98.1 °F (36.7 °C)  Weight: 56.3 kg (124 lb 1.6 oz)  Recent Labs     10/11/22  1956 10/10/22  1741   CREA 1.15 1.30   BUN 26* 29*   WBC 14.9* 9.0       Estimated Creatinine Clearance: 30.1 mL/min (based on SCr of 1.15 mg/dL). Pertinent Cultures:  Culture Date Source Results   10/12 blood NGTD   10/12 urine In process   MRSA Nasal Swab:  pending    Assessment:  Date/Time Current Dose Concentration Timing of Concentration (h) AUC   10/12 1000 mg x1 7.4 8 -   Note: Serum concentrations collected for AUC dosing may appear elevated if collected in close proximity to the dose administered, this is not necessarily an indication of toxicity    Plan:   Will start 1000 mg every 24 hours  Renal labs as indicated   Vancomycin concentration ordered for  when clinically indicated  Pharmacy will continue to monitor patient and adjust therapy as indicated    Thank you for the consult,  Jamarcus Rhodes  10/12/2022

## 2022-10-12 NOTE — CONSULTS
Comprehensive Nutrition Assessment    Type and Reason for Visit: Initial, NPO/clear liquid, Consult    Nutrition Recommendations/Plan:   Plan to start trickle tube feeds of Glucerna 1.5 at 10 mL/hr with water flushes of 30 mL q 6 hours. If pt tolerates trickle tube feeds and okay to advance, recommend advancing by 10 mL q 12 hours to goal rate of 45 mL/hr with water flushes of 150 mL 6 hours. (Goal EN provides:  1620 kcal, 89 gm protein, 820 mL free water, 100% RDIs)     Malnutrition Assessment:  Malnutrition Status: At risk for malnutrition (specify) (NPO, trickle tube feeds) (10/12/22 1211)      Nutrition History and Allergies:   Past medical hx: CAD, stroke, HTN, osteoporosis, atrial fibrillation. Wt trends PTA per chart hx:  139 lb on 6/5/22,  112 lb on 7/21/22,  124 lb on 10/11/22. Question if recent weight gain is related to fluid status as pt has edema. No known food allergies     Nutrition Assessment:    Pt admitted for shortness of breath; requiring intubation, mechanical ventilation. Has OGT in place. Okay to start tube feeds, trickle at 10 mL/hr, per MD.    Nutrition Related Findings:    BM 10/11, soft.    +edema. Pertinent meds:  cyanocobalamin, ferrous sulfate, lasix, lantus, SSI, Mg sulfate, versed, levophed, protonix held, vancomycin, zosyn Wound Type: None    Current Nutrition Intake & Therapies:  Average Meal Intake: NPO  Average Supplement Intake: NPO  DIET NPO    Anthropometric Measures:  Height: 5' 3\" (160 cm)  Ideal Body Weight (IBW): 115 lbs (52 kg)  Admission Body Weight: 130 lb 1.1 oz (pt stated)  Current Body Wt:  56.3 kg (124 lb 1.9 oz), 107.9 % IBW.  Not specified  Current BMI (kg/m2): 22  Usual Body Weight: 50.8 kg (112 lb)  % Weight Change (Calculated): 10.8  Weight Adjustment: No adjustment  BMI Category: Underweight (BMI less than 22) age over 72    Estimated Daily Nutrient Needs:  Energy Requirements Based On: Formula ([id number] x1.2-1.4)  Weight Used for Energy Requirements: Current  Energy (kcal/day): 2948-6351  Weight Used for Protein Requirements: Current  Protein (g/day):  (wt x1.2-2)  Method Used for Fluid Requirements: 1 ml/kcal  Fluid (ml/day): 5378-9472    Nutrition Diagnosis:   Inadequate oral intake related to impaired respiratory function as evidenced by NPO or clear liquid status due to medical condition, intubation    Nutrition Interventions:   Food and/or Nutrient Delivery: Continue NPO, Mineral supplement, Vitamin supplement, Start tube feeding  Nutrition Education/Counseling: Education not indicated  Coordination of Nutrition Care: Continue to monitor while inpatient, Interdisciplinary rounds  Plan of Care discussed with: interdisciplinary team    Goals:     Goals: Initiate nutrition support, by next RD assessment, Tolerate nutrition support at goal rate       Nutrition Monitoring and Evaluation:   Behavioral-Environmental Outcomes: None identified  Food/Nutrient Intake Outcomes: Enteral nutrition intake/tolerance, Vitamin/mineral intake  Physical Signs/Symptoms Outcomes: Biochemical data, Hemodynamic status, GI status, Fluid status or edema    Discharge Planning:     Too soon to determine    Cyndy Edwards, [address]  Contact: [phone]

## 2022-10-12 NOTE — PROGRESS NOTES
1800 pt was started on non rebreather 100% spo2 continued to be in 80's. Respiratory and MD paged. 1815 rapid response called. For respiratory distress. Blood pressure stable. Heart rate still in 120-130's IV lasix given Ativan and pt started on Cardizem drip. 1900 pt SPO2 still in 80's pt started on high flow. MD notified again 1930 pt spo2 not improving and started on BIPap. Orders given to transfer pt to stepdown.   Report called to Kris Carter

## 2022-10-12 NOTE — PROGRESS NOTES
INTERIM UPDATE - [id number] EST on 10/12/2022    Rapid Response Team call to report that they have performed a repeat ABG on Patient and are recommending Transfer to [facility] ICU. Plan: Will review Patient's Chart and contact [facility] Intensivist services regarding admission once Chart Review is complete and Rapid Response Team's recommendation is corroborated. INTERIM UPDATE - [id number] EST on 10/12/2022    Confirmed that Patient was clinically as poor as reported and in need of Emergent Endotracheal Intubation. Called and spoke with Intensivist services regarding Patient's decompensation and need for Emergency Endotracheal Intubation. Nursing Supervisor was spoken to and confirms that Patient will be Transferred to Medical ICU. Plan:  Anesthesiological services were consulted and Emergent Intubation is anticipated. INTERIM UPDATE - 5472 EST on 10/12/2022    Called and spoke with Patient's Emergency Contact and Daughter, Dany Diaz ([phone]), regarding Patient's Emergent Endotracheal Intubation and Transfer to ICU and Intensivist's service. Patient's Daughter reports that she will contact her Brother and he will likely call back with additional questions.

## 2022-10-12 NOTE — ANESTHESIA PROCEDURE NOTES
Emergent Intubation  Performed by: Rodrigo Marshall CRNA  Authorized by: Rodrigo Marshall CRNA     Emergent Intubation:   Patient location: [facility]. Date/Time:  10/12/2022 7:38 AM  Indications:  Potential airway compromise  Level of Consciousness: awake  Preoxygenated:  Yes      Airway Documentation:   Airway:  ETT - Cuffed  Technique:  Direct laryngoscopy  Insertion Site:  Oral  Blade Type:  Youssef  Blade Size:  2  ETT size (mm):  7.5  ETT Line Иван:  Lips  ETT Insertion depth (cm):  23  Placement verified by: auscultation, EtCO2 and BBS    Attempts:  1  Difficult airway: No

## 2022-10-12 NOTE — PROCEDURES
[address] [place] [address] Pulmonary Specialist  [facility] Procedure Note With Ultrasound Guidance    Indication: Need for vasopressors    Risks, benefits, alternatives explained and consent obtained via phone from daughter Paul Both. Time out performed. Patient positioned in Trendelenburg. Central line Bundle:  Full sterile barrier precautions used. 7-Step Sterility Protocol followed. (cap, mask sterile gown, sterile gloves, large sterile sheet, hand hygiene, 2% chlorhexidine for cutaneous antisepsis)  5 mL 1% Lidocaine placed at insertion site. Using ultrasound guidance,   Right internal jugular vein cannulated x 1 attempt(s) utilizing the modified Seldinger technique. Position of guidewire confirmed in vein using ultrasound prior to dilating. Dilation completed once successfully. Guidewire was removed. Central venous catheter was placed without difficulty. no immediate complications encountered. Good blood return on all 3 ports. Catheter secured & Biopatch applied. Sterile Tegaderm placed. CXR pending. Dr. Yuniel Wong MD and Dr. Jim Nova DO were present.      Citlali Turpin PA-C  10/12/22  Pulmonary, Critical Care Medicine  [address] [place] [address] Pulmonary Specialists

## 2022-10-12 NOTE — PROGRESS NOTES
ABG obtain on Bipap. RT tried to wean pt to HFNC. Pt RR increase to 60+. Pt placed back on Bipap. RT will continue to monitor respiratory status.

## 2022-10-12 NOTE — PROGRESS NOTES
Pt intubated during RRT due to low sats 80% and RR 52, bipap 16/10 rate 10 not meeting pt needs for oxygenation, pt has a history of CHF and has been with hyopoxia over night as well as metabolic Alkalosis, slight improvement of ph anc c02. ETT 7.5 secured 23 at gum line, End tidal with good yellow color change at time of intubation. And good BS. Ambu 100% bagged to ICU bed 304 from CVT step down. No issues during tranport, sats increased to 96% -94% during bagging with ETT on 100%. Report given to ICU RRT.

## 2022-10-12 NOTE — PROGRESS NOTES
Rapid Response Note  [facility]    Patient: Jalen Vazquez 78 y.o. female  [id number]  1940      Admit Date: 10/10/2022   Admission Diagnosis: Pneumonia [J18.9]    RAPID RESPONSE     Rapid response called for acute respiratory distress and hypoxia despite being on BiPAP. Previous Rapid called 10/11:  \"Rapid response called for acute respiratory distress. Patient had SOB and tachycardia with oxygen saturation in high 70s-low 80s. Patient has history of CHF with result of proBNP 93235 (10/10/22) on lasix 40mg PO, Afib on lopressor 5mg PO and xarelto. Before evaluation, nursing staff placed patient on 6L NC and a non rebreather mask increasing oxygen saturation to mid 90s. On physical exam, patient noted to have crackles at bilateral bases of lungs, tachycardic HR; and was gasping for air, but noted some improvement after calming down due to anxiety. CXR, ABG, CMP, and lactic acid were ordered for further evaluation. Patients 2 relatives noted outside the door. \"    Medications and recent labs/diagnostics reviewed. OBJECTIVE     Visit Vitals  /77   Pulse (!) 106   Temp 98.1 °F (36.7 °C)   Resp (!) 51   Wt 56.3 kg (124 lb 1.6 oz)   SpO2 (!) 79%   BMI 21.98 kg/m²       Physical Exam:   General: in moderate distress  Cardiovascular: RRR w/o MRGs  Respiratory: CTAB w/o rales, rhonchi, wheezes,  increased work of breathing  Abdomen: Soft, +BS, non-TTP, Non-Distended  Extremities:  edema in lower extremities bilaterally, 2+ radial pulses intact bilaterally  Neuro: Cranial nerves grossly intact, grossly moving upper and lower extremities  Skin: Negative for lesions, ulcers, rashes    ASSESSMENT, PLAN & DISPOSITION   Jalen Vazquez is a 78y.o. year old female admitted for Pneumonia [J18.9]. Rapid response called for acute respiratory distress.  Patient condition currently: Critical.    Medications administered: None     EKG: None    Labs/Diagnostics: ABG, Lactic acid, CXR    Plan:  - Anesthesia consulted and patient was intubated  - Transfer to ICU    Disposition: ICU    Attending notified of rapid response. In agreement with plan. Primary team resuming care. Dr. Sandra Anne and Dr. Ed Ware, senior residents, present during rapid response.

## 2022-10-12 NOTE — PROGRESS NOTES
Pt SpO2 in 80s throughout night, this RN and RTs ([facility]) continually monitoring pt. Pt in respiratory alkalosis. 5892. Amauri Trentonramsey Raymond Per recs of RT, this RN increased bipap to 100%; pt sats improved to 90% for a short period  of time and then gradually decreased to 79-81%. 7561. Amauri Segundo Raymond Pt SpO2 79% and pt remains tachypneic in 50s. RRT called. Pt will be intubated and transferred to ICU.       0742. ..report called to Micha Parham RN in ICU. Pt to be transported to room 303.

## 2022-10-12 NOTE — PROGRESS NOTES
[address] [facility] Pharmacokinetic Monitoring Service - Vancomycin   Day 1 of 7    Ana Cristina Martin is a 78 y.o. female starting on vancomycin therapy for Pneumonia (CAP). Pharmacy consulted by Adriana Wilkes MD for monitoring and adjustment. Target Concentration: Dosing based on anticipated concentration <15 mg/L due to renal impairment/insufficiency    Additional Antimicrobials: Piperacillin/Tazobactam    Pertinent Laboratory Values:   Temp: 99 °F (37.2 °C)  Weight: 59 kg (130 lb)  Recent Labs     10/10/22  1741   CREA 1.30   BUN 29*   WBC 9.0     Estimated Creatinine Clearance: 26.6 mL/min (based on SCr of 1.3 mg/dL). Pertinent Cultures:  Microbiology Results (Current encounter)   Date/Time Order Name Specimen Source Lab Status   10/10/22 2103 COVID-19 WITH INFLUENZA A/B Nasopharyngeal Final result   10/10/22 1923 CULTURE, URINE Cath Urine In process   10/10/22 1741 CULTURE, BLOOD Blood Preliminary result   10/10/22 1738 CULTURE, BLOOD Blood Preliminary result   MRSA Nasal Swab: Not ordered.  Order placed by pharmacy    Plan:  Concentration-guided dosing due to renal impairment/insufficiency  Start vancomycin 1000 mg IV x 1 dose  Will dose according to vancomycin concentration levels at this time due to renal insufficiency  Renal labs as indicated   Vancomycin concentration ordered for AM labs tomorrow  Pharmacy will continue to monitor patient and adjust therapy as indicated    Thank you for the consult,  ADRI Waterman  10/11/2022

## 2022-10-12 NOTE — DIABETES MGMT
Diabetes/ Glycemic Control Plan of Care  Recommendations:   Recommend adding 4 units Lantus  q 24 hrs. Assessment: BG  readings above target range. DX:   1. Hypoxia    2. Community acquired pneumonia of right lower lobe of lung    3. Acute on chronic systolic congestive heart failure (HCC)       Fasting/ Morning blood glucose:   Lab Results   Component Value Date/Time    Glucose 225 (H) 10/12/2022 09:54 AM    Glucose (POC) 223 (H) 10/12/2022 10:24 AM     IV Fluids containing dextrose: none  Steroids:  n/a    Blood glucose values: Within target range (70-180mg/dL):  no  Current insulin orders: n/a to start  Total Daily Dose previous 24 hours = 0  Current A1c:   Lab Results   Component Value Date/Time    Hemoglobin A1c 5.6 10/12/2022 09:54 AM      equivalent  to ave Blood Glucose of 108 mg/dl for 2-3 months prior to admission  Adequate glycemic control PTA: yes  Nutrition/Diet: npo -Glucerna 1.5 TF to start  Home diabetes medications: none    Plan/Goals:   Blood glucose will be within target of 70 - 180 mg/dl within 72 hours  Reinforce dietary and medication compliance at home if indicated.             Education:  [] Refer to Diabetes Education Record                       [x] Education not indicated at this time -vent    Randolph Jeronimo RD BC-ADM

## 2022-10-13 NOTE — PROGRESS NOTES
, Patient is a 17-year-old female with past medical history of coronary artery disease s/p MI, CVA, systolic and diastolic CHF, cardiomyopathy with EF of 20 to 25%, decreased RV function who presented to the hospital with shortness of breath and lower extremity edema. She was found to and was diagnosed with CHF exacerbation and community-acquired pneumonia, started on antibiotics. She had worsening respiratory failure and was intubated and transferred to the ICU. Chest x-ray showed worsening pulmonary edema, overall appears fluid overloaded. Lactic acid which was elevated had improved with improvement in blood pressures. Severe tachycardia and A. fib and patient was started on amiodarone drip, Bumex drip for diuresis but diuresis has been suboptimal.  Nephrology was therefore consulted to help with diuresis versus ultrafiltration in the setting of possible diuretic resistance. Assessment and plan:    #1 nonoliguric ELY, secondary to ischemic ATN versus cardiorenal syndrome, patient is volume overloaded in the setting of decompensated CHF with systolic diastolic and RV dysfunction. Needs diuresis versus ultrafiltration, so far high-dose diuretics have not helped with diuresis.   We will move forward with

## 2022-10-13 NOTE — PROCEDURES
DIALYSIS CATH (NON-TUNNELED) Procedure with Ultrasound (US) Guidance     Indication: Acute renal injury in setting of severe hypervolemia, non-responsive to aggressive diuresis    Risks, benefits, alternatives explained and consent obtained. Consent obtained from son Mansi Viramontes via phone. Patient positioned in reverse Trendelenburg. Timeout called with RN and confirmed patient ID, procedure, site, allergy, consent etc.       Dialysis Line Bundle:  Full sterile barrier precautions used. 7-Step Sterility Protocol followed. (cap, mask sterile gown, sterile gloves, large sterile sheet, hand hygiene, 2% chlorhexidine for cutaneous antisepsis)  5 mL 1% Lidocaine placed at insertion site. Using ultrasound guidance,   Right femoral cannulated x 1 attempt(s) utilizing the modified Seldinger technique. No difficulty. Position of wire confirmed in vein using US before dilating. Wire was removed. Palindrome HD catheter (non-tunneled, 28cm length) placed via peel-away sheath placed without difficulty  Good blood return. Catheter secured & Biopatch applied. Ports flushed with sterile saline and locked. Sterile Tegaderm placed. KUB pending. Due to technical limitations, ultrasound images unable to be uploaded into [facility]    DO Margarito Hollins Pulmonary & Critical Care Medicine Fellow  10/13/2022 2:42 PM         I was present for and supervised the entire procedure. See resident note for details.       Penny Bird MD/MPH     Pulmonary, Critical Care Medicine  [address] [place] [address] Pulmonary Specialists

## 2022-10-13 NOTE — PROGRESS NOTES
[facility]  Pulmonary, Critical Care, and Sleep Medicine    Name: Krysten Adams MRN: [id number]   : 1940 Hospital: [facility]   Date: 10/13/2022        Critical Care Progress Note      IMPRESSION:   Acute hypoxic respiratory failure requiring mechanical ventilation  CHF exacerbation - combined systolic and diastolic dysfunction, last EF  2022 was 20-25%  Acute pulmonary edema  SIRS:  no current source of sepsis  Lactic acidosis  COPD  CAD - stents placed 2021  A-fib with RVR  Shock:  Cardiogenic secondary to medications including CCB and BB  Hyperlipidemia  UTI  H/o MI  H/o CVA - aphasia  H/o GERD  H/o anxiety     Patient Active Problem List   Diagnosis Code    CHF (congestive heart failure) ([facility]) I50.9    Acute on chronic systolic and diastolic heart failure, NYHA class 4 ([facility]) I50.43    Fluid overload E87.70    Carotid artery disease ([facility]) I77.9    Pneumonia J18.9    UTI (urinary tract infection) N39.0        RECOMMENDATIONS:   Neuro: Titrate sedation to RASS of 0 to -1. PRN for breakthrough sedation needs. H/o aphasia following CVA. Pulm: Titrate FiO2 for goal SPO2> 90%,VAP prevention bundle. Daily sedation holiday and assessment for weaning with SBT as tolerated. PRN duonebs. Aspiration precautions, Keep HOB >30 degrees. CVS : Continuous cardiac monitoring, titrate levophed to maintain MAP >65mmHg. Echo 10/12 showed EF 20-25% (similar to ). Heparin gtt for A-fib RVR. On bumex drip. On amiodarone + 1 dose digoxin o/n, unfortunately rate still in 120-140s. Cardio consulted. Severe volume overload poorly responsive to diuresis- depending on how she responds and goals of care, would have to consider CRRT. GI: tube feeds  Renal: Strict I/Os. Cr 1.3- ELY vs ATN, ?cardiorenal syndrome. Bumex drip. Bladder scan. Nephro consult for further recs, consideration of CRRT. Hem/Onc: Monitor for s/o active bleeding. pTT daily while on heparin.    I/D: Follow up on Bcx, sputum Cx. Antibiotics: Zosyn, Vancomycin. Trend WBCs and temperature curve. WBCs   Endocrine: Q6 glucoses, SSI. Metabolic:  Daily BMP, mag, phos. Trend lytes, replace as needed. Musc/Skin: no acute issues, wound care    Full Code  Discussed during interdisciplinary rounds. Best practice : APPLICABLE TO PATIENT    Glycemic control  IHI ICU bundles: Villeda Bundle Followed and Vent Bundle Followed, Vent Day 10/12     [facility] Vent patients-    VAP bundle-[place] tube to suction at 20-30 cm Hg, Maintain [place] tube with 5-10ml air every 4 hours, Routine oral care every 4 hours, Elevation of head > 45 degree, Daily sedation holiday and SBT evaluation starting at 6.00am. and [facility] network Guidelines: Lung protective strategy and Plateau  Pressure goal < 30 cm H2O goals, Oxygenation Goals PaO2 55-80 mm Hg or SaO2 88-95%, PH goal 7.30-7.45  Sress ulcer prophylaxis. Protonix  DVT prophylaxis. Heparin gtt  Need for Lines, villeda assessed. Palliative care evaluation. Restraints need. Restraint evaluation     I have reevaluated the patient after initiation of intervention. The patient is comfortable, uninjured, and is not posing a risk to themselves, staff or others. The restraints have been tolerated well. The patient's current medical and behavioral conditions that warrant the use intervention include danger to self and Interference with medical equipment or treatment. Restraint or seclusion will be discontinued at the earliest possible time, regardless of the scheduled expiration of the order. Based on my evaluation, restraints will be continued: yes        Subjective/History:     10/13/22    Patient is a 78 y.o. female with PMH MI, CVA, systolic and diastolic CHF, COPD, A-fib, CAD admitted to [facility] on 10/10/22 via EMS c/o SOB and pedal edema. She was found to be hypoxic in CHF exacerbation and dx of CAP, started on ABX and admitted.  She unfortunately was getting half of her home dose of lasix orally despite her CHF exacerbation with BNP greater than 20k. Per chart review, on 10/11 rapid response was called for acute respiratory distress with O2 sats 70s to low 80s, on NC and NRB. With no improvement in oxygenation she was switched to HFNC then BIPAP. The morning of 10/12 she was found to be tachypneic with  respiratory alkalosis and worsening SpO2. She was intubated and transferred to ICU. CXR consistent with pulmonary edema. She was also in Afib RVR and was on a diltiazem drip. This was stopped upon arrival to the ICU as her rate was well controlled at that time and she was becoming hypotensive. Started on levophed and central line placed on 10/12. The patient is critically ill and can not provide additional history due to sedation/mechanical ventilation. Overnight Events:  -BNP greater than 40k- doubled since yesterday  -poorly responding to aggressive diuresis- on bumex drip at 1mg with less than 1L output over last 24hr  -Afib RVR o/n with rate as high as 160s- started amiodarone drip and given digoxin x1, rate improved slightly to 120s-140s still afib RVR  -Desaturated o/n requiring temporary increase in FiO2 to %, now onFiO2 80% w PEEP 12      Past Medical History:   Diagnosis Date    A-fib ([facility])     CAD (coronary artery disease)     Heart Failure    Chronic systolic CHF (congestive heart failure) ([facility])     LVEF 20-25% & Abnormal Diastolic Function by TTE on 6/03/2022    Hypertension     MI (myocardial infarction) ([facility] [address]     Osteoporosis     Stroke [facility])         Past Surgical History:   Procedure Laterality Date    HX BACK SURGERY      AR CARDIAC SURG PROCEDURE UNLIST      stents placed at [facility]        Prior to Admission medications    Medication Sig Start Date End Date Taking? Authorizing Provider   ramipriL (ALTACE) 1.25 mg capsule Take 1 Capsule by mouth in the morning. 7/22/22   Tamie Portillo MD   furosemide (LASIX) 40 mg tablet Take 1 Tablet by mouth two (2) times a day.  Please dispense 40 mg BID 60 tablet for 30 days 7/21/22   Cheryl Vega MD   midodrine (PROAMATINE) 2.5 mg tablet Take 2.5 mg by mouth two (2) times a day. Provider, Historical   memantine (NAMENDA) 10 mg tablet Take 10 mg by mouth two (2) times a day. Provider, Historical   ascorbic acid, vitamin C, (VITAMIN C) 250 mg tablet Take 250 mg by mouth daily. Billy Wilcox MD   fluticasone furoate-vilanteroL (Breo Ellipta) 100-25 mcg/dose inhaler Take 1 Puff by inhalation daily. Billy Wilcox MD   calcium-cholecalciferol, d3, 600-125 mg-unit tab Take  by mouth. Billy Wilcox MD   cyanocobalamin 1,000 mcg tablet Take 1,000 mcg by mouth daily. Billy Wilcox MD   ferrous sulfate 325 mg (65 mg iron) tablet Take 325 mg by mouth Daily (before breakfast). Billy Wilcox MD   atorvastatin (LIPITOR) 80 mg tablet Take 80 mg by mouth nightly. Billy Wilcox MD   clonazePAM (KLONOPIN) 0.5 mg tablet Take 0.5 mg by mouth nightly as needed. Billy Wilcox MD   clopidogrel (PLAVIX) 75 mg tab Take 75 mg by mouth daily. Billy Wilcox MD   pantoprazole (PROTONIX) 40 mg tablet Take 40 mg by mouth daily. Billy Wilcox MD   escitalopram oxalate (LEXAPRO) 5 mg tablet Take 15 mg by mouth daily. Billy Wilcox MD   aspirin delayed-release 81 mg tablet Take  by mouth daily. Billy Wilcox MD   metoprolol (LOPRESSOR) 100 mg tablet Take 25 mg by mouth three (3) times daily.     Billy Wilcox MD       Current Facility-Administered Medications   Medication Dose Route Frequency    digoxin (LANOXIN) 250 mcg/mL (0.25 mg/mL) injection        pantoprazole (PROTONIX) 40 mg in 0.9% sodium chloride 10 mL injection  40 mg IntraVENous DAILY    [START ON 10/14/2022] vancomycin (VANCOCIN) 750 mg in 0.9% sodium chloride 250 mL (VIAL-MATE)  750 mg IntraVENous Q24H    chlorhexidine (PERIDEX) 0.12 % mouthwash 10 mL  10 mL Oral Q12H    fentaNYL (PF) 1,500 mcg/30 mL (50 mcg/mL) infusion  0-200 mcg/hr IntraVENous TITRATE    insulin glargine (LANTUS) injection 4 Units  4 Units SubCUTAneous DAILY    insulin lispro (HUMALOG) injection   SubCUTAneous Q6H    dexmedeTOMidine in 0.9 % NaCl (PRECEDEX) 400 mcg/100 mL (4 mcg/mL) infusion soln  0.1-1.5 mcg/kg/hr IntraVENous TITRATE    heparin (porcine) 25,000 units in 0.45% saline 250 ml infusion  18-36 Units/kg/hr IntraVENous TITRATE    bumetanide (BUMEX) 12.5 mg in 0.9% sodium chloride 100 mL infusion  1 mg/hr IntraVENous CONTINUOUS    NOREPINephrine (LEVOPHED) 32,000 mcg in dextrose 5% 250 mL (128 mcg/mL) infusion  0.5-30 mcg/min IntraVENous TITRATE    amiodarone (NEXTERONE) 360 mg in dextrose 200 mL (1.8 mg/mL) infusion  1 mg/min IntraVENous TITRATE    aspirin delayed-release tablet 81 mg  81 mg Oral DAILY    atorvastatin (LIPITOR) tablet 80 mg  80 mg Oral QHS    [Held by provider] clopidogreL (PLAVIX) tablet 75 mg  75 mg Oral DAILY    cyanocobalamin tablet 1,000 mcg  1,000 mcg Oral DAILY    [Held by provider] escitalopram oxalate (LEXAPRO) tablet 15 mg  15 mg Oral DAILY    ferrous sulfate tablet 325 mg  325 mg Oral ACB    memantine (NAMENDA) tablet 10 mg  10 mg Oral BID    [Held by provider] metoprolol tartrate (LOPRESSOR) tablet 25 mg  25 mg Oral TID    [Held by provider] lisinopriL (PRINIVIL, ZESTRIL) tablet 5 mg  5 mg Oral DAILY    albuterol-ipratropium (DUO-NEB) 2.5 MG-0.5 MG/3 ML  3 mL Nebulization Q4H RT    piperacillin-tazobactam (ZOSYN) 3.375 g in 0.9% sodium chloride (MBP/ADV) 100 mL MBP  3.375 g IntraVENous Q8H    midodrine (PROAMATINE) tablet 10 mg  10 mg Oral TID       No Known Allergies     Social History     Tobacco Use    Smoking status: Former    Smokeless tobacco: Never   Substance Use Topics    Alcohol use: No        No family history on file. Review of Systems:  Review of systems not obtained due to patient factors.     Objective:   Vital Signs:    Visit Vitals  /67   Pulse (!) 124   Temp 98.7 °F (37.1 °C)   Resp 14   Ht 5' 3\" (1.6 m)   Wt 52 kg (114 lb 10.2 oz)   SpO2 92% Breastfeeding No   BMI 20.31 kg/m²       O2 Device: Endotracheal tube, Ventilator   O2 Flow Rate (L/min): 50 l/min   Temp (24hrs), Av.4 °F (36.9 °C), Min:97.9 °F (36.6 °C), Max:98.7 °F (37.1 °C)       Intake/Output:   Last shift:      No intake/output data recorded. Last 3 shifts: 10/11 1901 - 10/13 0700  In: 2643.1 [I.V.:2323.1]  Out: 922 [Urine:922]    Intake/Output Summary (Last 24 hours) at 10/13/2022 08  Last data filed at 10/13/2022 07  Gross per 24 hour   Intake 2643.11 ml   Output 622 ml   Net .11 ml           Physical Exam:     General:  Intubated, sedated   Head:  Normocephalic, without obvious abnormality, atraumatic. Eyes:  Conjunctivae/corneas clear. PERRL,   Nose: Nares normal. Septum midline. Mucosa normal.        Neck: Supple, symmetrical, trachea midline       Lungs:   Scattered rales bilateral lung fields   Chest wall:  No deformity. Heart:  Irregular RR tachycardic   Abdomen:   Soft, non-tender. Bowel sounds normal. No masses. Extremities: Extremities normal, atraumatic, no cyanosis. 2+ pitting edema yamil LEs   Pulses: 2+ and symmetric all extremities.    Skin: Skin color, texture, turgor normal. No rashes or lesions       Neurologic: Grossly nonfocal     Devices:  ETT  OGT  Central line  [place]    Data:     Recent Results (from the past 24 hour(s))   PROTHROMBIN TIME + INR    Collection Time: 10/12/22  9:54 AM   Result Value Ref Range    Prothrombin time 14.7 11.5 - 15.2 sec    INR 1.1 0.8 - 1.2     METABOLIC PANEL, BASIC    Collection Time: 10/12/22  9:54 AM   Result Value Ref Range    Sodium 142 136 - 145 mmol/L    Potassium 3.7 3.5 - 5.5 mmol/L    Chloride 108 100 - 111 mmol/L    CO2 20 (L) 21 - 32 mmol/L    Anion gap 14 3.0 - 18 mmol/L    Glucose 225 (H) 74 - 99 mg/dL    BUN 31 (H) 7.0 - 18 MG/DL    Creatinine 1.47 (H) 0.6 - 1.3 MG/DL    BUN/Creatinine ratio 21 (H) 12 - 20      eGFR 35 (L) >60 ml/min/1.73m2    Calcium 8.2 (L) 8.5 - 10.1 MG/DL   LACTIC ACID    Collection Time: 10/12/22  9:54 AM   Result Value Ref Range    Lactic acid 5.6 (HH) 0.4 - 2.0 MMOL/L   MAGNESIUM    Collection Time: 10/12/22  9:54 AM   Result Value Ref Range    Magnesium 1.8 1.6 - 2.6 mg/dL   PHOSPHORUS    Collection Time: 10/12/22  9:54 AM   Result Value Ref Range    Phosphorus 5.2 (H) 2.5 - 4.9 MG/DL   NT-PRO BNP    Collection Time: 10/12/22  9:54 AM   Result Value Ref Range    NT pro-BNP 40,821 (H) 0 - 1,800 PG/ML   CBC WITH AUTOMATED DIFF    Collection Time: 10/12/22  9:54 AM   Result Value Ref Range    WBC 17.5 (H) 4.6 - 13.2 K/uL    RBC 2.91 (L) 4.20 - 5.30 M/uL    HGB 9.6 (L) 12.0 - 16.0 g/dL    HCT 30.4 (L) 35.0 - 45.0 %    .5 (H) 78.0 - 100.0 FL    MCH 33.0 24.0 - 34.0 PG    MCHC 31.6 31.0 - 37.0 g/dL    RDW 21.7 (H) 11.6 - 14.5 %    PLATELET 218 001 - 920 K/uL    MPV 9.9 9.2 - 11.8 FL    NRBC 0.0 0  WBC    ABSOLUTE NRBC 0.00 0.00 - 0.01 K/uL    NEUTROPHILS 94 (H) 40 - 73 %    BAND NEUTROPHILS 2 %    LYMPHOCYTES 2 (L) 21 - 52 %    MONOCYTES 2 (L) 3 - 10 %    EOSINOPHILS 0 0 - 5 %    BASOPHILS 0 0 - 2 %    IMMATURE GRANULOCYTES 0 0.0 - 0.5 %    ABS. NEUTROPHILS 16.7 (H) 1.8 - 8.0 K/UL    ABS. LYMPHOCYTES 0.4 (L) 0.9 - 3.6 K/UL    ABS. MONOCYTES 0.4 0.05 - 1.2 K/UL    ABS. EOSINOPHILS 0.0 0.0 - 0.4 K/UL    ABS. BASOPHILS 0.0 0.0 - 0.1 K/UL    ABS. IMM.  GRANS. 0.0 0.00 - 0.04 K/UL    DF MANUAL      PLATELET COMMENTS ADEQUATE PLATELETS      RBC COMMENTS NORMOCYTIC, NORMOCHROMIC     RESPIRATORY VIRUS PANEL W/COVID-19, PCR    Collection Time: 10/12/22  9:54 AM    Specimen: Nasopharyngeal   Result Value Ref Range    Adenovirus Not detected NOTD      Coronavirus 229E Not detected NOTD      Coronavirus HKU1 Not detected NOTD      Coronavirus CVNL63 Not detected NOTD      Coronavirus OC43 Not detected NOTD      SARS-CoV-2, PCR Not detected NOTD      Metapneumovirus Not detected NOTD      Rhinovirus and Enterovirus Not detected NOTD      Influenza A Not detected NOTD      Influenza B Not detected NOTD      Parainfluenza 1 Not detected NOTD      Parainfluenza 2 Not detected NOTD      Parainfluenza 3 Not detected NOTD      Parainfluenza virus 4 Not detected NOTD      RSV by PCR Not detected NOTD      B. parapertussis, PCR Not detected NOTD      Bordetella pertussis - PCR Not detected NOTD      Chlamydophila pneumoniae DNA, QL, PCR Not detected NOTD      Mycoplasma pneumoniae DNA, QL, PCR Not detected NOTD     PROCALCITONIN    Collection Time: 10/12/22  9:54 AM   Result Value Ref Range    Procalcitonin 14.14 ng/mL   HEMOGLOBIN A1C WITH EAG    Collection Time: 10/12/22  9:54 AM   Result Value Ref Range    Hemoglobin A1c 5.6 4.2 - 5.6 %    Est. average glucose 114 mg/dL   GLUCOSE, POC    Collection Time: 10/12/22 10:24 AM   Result Value Ref Range    Glucose (POC) 223 (H) 70 - 110 mg/dL   CBC WITH AUTOMATED DIFF    Collection Time: 10/12/22  1:29 PM   Result Value Ref Range    WBC 15.5 (H) 4.6 - 13.2 K/uL    RBC 2.68 (L) 4.20 - 5.30 M/uL    HGB 8.8 (L) 12.0 - 16.0 g/dL    HCT 28.0 (L) 35.0 - 45.0 %    .5 (H) 78.0 - 100.0 FL    MCH 32.8 24.0 - 34.0 PG    MCHC 31.4 31.0 - 37.0 g/dL    RDW 21.6 (H) 11.6 - 14.5 %    PLATELET 368 631 - 677 K/uL    MPV 9.8 9.2 - 11.8 FL    NRBC 0.0 0  WBC    ABSOLUTE NRBC 0.00 0.00 - 0.01 K/uL    NEUTROPHILS 85 (H) 40 - 73 %    LYMPHOCYTES 9 (L) 21 - 52 %    MONOCYTES 5 3 - 10 %    EOSINOPHILS 0 0 - 5 %    BASOPHILS 0 0 - 2 %    IMMATURE GRANULOCYTES 1 (H) 0.0 - 0.5 %    ABS. NEUTROPHILS 13.1 (H) 1.8 - 8.0 K/UL    ABS. LYMPHOCYTES 1.4 0.9 - 3.6 K/UL    ABS. MONOCYTES 0.8 0.05 - 1.2 K/UL    ABS. EOSINOPHILS 0.0 0.0 - 0.4 K/UL    ABS. BASOPHILS 0.0 0.0 - 0.1 K/UL    ABS. IMM.  GRANS. 0.1 (H) 0.00 - 0.04 K/UL    DF AUTOMATED     PTT    Collection Time: 10/12/22  1:29 PM   Result Value Ref Range    aPTT 29.9 23.0 - 36.4 SEC   BLOOD GAS,CHEM8,LACTIC ACID POC    Collection Time: 10/12/22  2:40 PM   Result Value Ref Range    pH (POC) 7.48 (H) 7.35 - 7.45      pCO2 (POC) 23.5 (L) 35.0 - 45.0 MMHG    pO2 (POC) 62 (L) 80 - 100 MMHG    Calcium, ionized (POC) 1.06 (L) 1.12 - 1.32 mmol/L    Base deficit (POC) 5.0 mmol/L    HCO3 (POC) 17.4 (L) 22 - 26 MMOL/L    CO2, POC 17 (L) 19 - 24 MMOL/L    O2 SAT 94 %    Sample source ARTERIAL      SITE LEFT RADIAL      VASHTI'S TEST Positive      Device: ADULT VENT      FIO2 80 %    PEEP/CPAP 8      Performed by Yuli Vital     Sodium (POC) 142 136 - 145 mmol/L    Potassium (POC) 3.1 (L) 3.5 - 5.1 mmol/L    Glucose (POC) 161 (H) 65 - 100 mg/dL    Creatinine (POC) 1.35 (H) 0.6 - 1.3 mg/dL    Lactic Acid (POC) 4.43 (HH) 0.40 - 2.00 mmol/L    Chloride (POC) 109 (H) 98 - 107 mmol/L    Anion gap, POC 17 10 - 20     ECHO ADULT FOLLOW-UP OR LIMITED    Collection Time: 10/12/22  3:55 PM   Result Value Ref Range    IVSd 1.1 (A) 0.6 - 0.9 cm    LVIDd 4.1 3.9 - 5.3 cm    LVIDs 3.7 cm    LVPWd 1.1 (A) 0.6 - 0.9 cm    RV Free Wall Peak S' 5 cm/s    TAPSE 1.3 (A) 1.7 cm    TR Peak Gradient 45 mmHg    TR Max Velocity 3.37 m/s    Fractional Shortening 2D 10 28 - 44 %    LVIDd Index 2.59 cm/m2    LVIDs Index 2.34 cm/m2    LV RWT Ratio 0.54     LV Mass 2D 151.3 67 - 162 g    LV Mass 2D Index 95.8 (A) 43 - 95 g/m2    PASP 53 mmHg   GLUCOSE, POC    Collection Time: 10/12/22  5:25 PM   Result Value Ref Range    Glucose (POC) 161 (H) 70 - 110 mg/dL   PTT    Collection Time: 10/12/22  9:24 PM   Result Value Ref Range    aPTT 120.1 (H) 23.0 - 36.4 SEC   LACTIC ACID    Collection Time: 10/12/22 11:50 PM   Result Value Ref Range    Lactic acid 2.7 (HH) 0.4 - 2.0 MMOL/L   MAGNESIUM    Collection Time: 10/12/22 11:50 PM   Result Value Ref Range    Magnesium 2.4 1.6 - 2.6 mg/dL   METABOLIC PANEL, BASIC    Collection Time: 10/12/22 11:50 PM   Result Value Ref Range    Sodium 138 136 - 145 mmol/L    Potassium 3.9 3.5 - 5.5 mmol/L    Chloride 106 100 - 111 mmol/L    CO2 21 21 - 32 mmol/L    Anion gap 11 3.0 - 18 mmol/L    Glucose 163 (H) 74 - 99 mg/dL    BUN 36 (H) 7.0 - 18 MG/DL Creatinine 1.48 (H) 0.6 - 1.3 MG/DL    BUN/Creatinine ratio 24 (H) 12 - 20      eGFR 35 (L) >60 ml/min/1.73m2    Calcium 8.7 8.5 - 10.1 MG/DL   PHOSPHORUS    Collection Time: 10/12/22 11:50 PM   Result Value Ref Range    Phosphorus 3.7 2.5 - 4.9 MG/DL   CALCIUM, IONIZED    Collection Time: 10/12/22 11:50 PM   Result Value Ref Range    Ionized Calcium 1.11 (L) 1.15 - 1.33 MMOL/L   GLUCOSE, POC    Collection Time: 10/12/22 11:53 PM   Result Value Ref Range    Glucose (POC) 155 (H) 70 - 110 mg/dL   BLOOD GAS, ARTERIAL POC    Collection Time: 10/13/22  3:33 AM   Result Value Ref Range    Device: ADULT VENT      FIO2 (POC) 90 %    pH (POC) 7.38 7.35 - 7.45      pCO2 (POC) 32.9 (L) 35.0 - 45.0 MMHG    pO2 (POC) 91 80 - 100 MMHG    HCO3 (POC) 19.4 (L) 22 - 26 MMOL/L    sO2 (POC) 97.0 92 - 97 %    Base deficit (POC) 4.9 mmol/L    Mode PRESSURE CONTROL      Set Rate 10 bpm    PEEP/CPAP (POC) 12 cmH2O    Allens test (POC) NOT APPLICABLE      Total resp. rate 17      Site RIGHT RADIAL      Specimen type (POC) ARTERIAL      Performed by RASHEL Riddle    Collection Time: 10/13/22  4:09 AM   Result Value Ref Range    Vancomycin, random 12.1 5.0 - 40.0 UG/ML   CBC WITH AUTOMATED DIFF    Collection Time: 10/13/22  4:09 AM   Result Value Ref Range    WBC 17.6 (H) 4.6 - 13.2 K/uL    RBC 2.89 (L) 4.20 - 5.30 M/uL    HGB 9.6 (L) 12.0 - 16.0 g/dL    HCT 29.6 (L) 35.0 - 45.0 %    .4 (H) 78.0 - 100.0 FL    MCH 33.2 24.0 - 34.0 PG    MCHC 32.4 31.0 - 37.0 g/dL    RDW 21.6 (H) 11.6 - 14.5 %    PLATELET 257 193 - 774 K/uL    MPV 9.3 9.2 - 11.8 FL    NRBC 0.0 0  WBC    ABSOLUTE NRBC 0.00 0.00 - 0.01 K/uL    NEUTROPHILS 85 (H) 40 - 73 %    LYMPHOCYTES 8 (L) 21 - 52 %    MONOCYTES 6 3 - 10 %    EOSINOPHILS 0 0 - 5 %    BASOPHILS 0 0 - 2 %    IMMATURE GRANULOCYTES 1 (H) 0.0 - 0.5 %    ABS. NEUTROPHILS 14.9 (H) 1.8 - 8.0 K/UL    ABS. LYMPHOCYTES 1.4 0.9 - 3.6 K/UL    ABS. MONOCYTES 1.1 0.05 - 1.2 K/UL    ABS. EOSINOPHILS 0.1 0.0 - 0.4 K/UL    ABS. BASOPHILS 0.1 0.0 - 0.1 K/UL    ABS. IMM. GRANS. 0.1 (H) 0.00 - 0.04 K/UL    DF AUTOMATED     LACTIC ACID    Collection Time: 10/13/22  4:09 AM   Result Value Ref Range    Lactic acid 2.1 (HH) 0.4 - 2.0 MMOL/L   MAGNESIUM    Collection Time: 10/13/22  4:09 AM   Result Value Ref Range    Magnesium 2.4 1.6 - 2.6 mg/dL   METABOLIC PANEL, BASIC    Collection Time: 10/13/22  4:09 AM   Result Value Ref Range    Sodium 142 136 - 145 mmol/L    Potassium 3.6 3.5 - 5.5 mmol/L    Chloride 109 100 - 111 mmol/L    CO2 23 21 - 32 mmol/L    Anion gap 10 3.0 - 18 mmol/L    Glucose 137 (H) 74 - 99 mg/dL    BUN 36 (H) 7.0 - 18 MG/DL    Creatinine 1.36 (H) 0.6 - 1.3 MG/DL    BUN/Creatinine ratio 26 (H) 12 - 20      eGFR 38 (L) >60 ml/min/1.73m2    Calcium 8.8 8.5 - 10.1 MG/DL   PHOSPHORUS    Collection Time: 10/13/22  4:09 AM   Result Value Ref Range    Phosphorus 3.6 2.5 - 4.9 MG/DL   CALCIUM, IONIZED    Collection Time: 10/13/22  4:09 AM   Result Value Ref Range    Ionized Calcium 1.23 1.15 - 1.33 MMOL/L   PTT    Collection Time: 10/13/22  4:09 AM   Result Value Ref Range    aPTT 103.3 (H) 23.0 - 36.4 SEC   GLUCOSE, POC    Collection Time: 10/13/22  6:22 AM   Result Value Ref Range    Glucose (POC) 129 (H) 70 - 110 mg/dL           Recent Labs     10/13/22  0333 10/12/22  1440 10/12/22  0706 10/11/22  2357 10/11/22  1841   FIO2I 90  --   --  60 15   HCO3I 19.4* 17.4* 17.5* 19.5* 17.0*   PCO2I 32.9* 23.5* 31.3* 23.6* 22.3*   PHI 7.38 7.48* 7.36 7.53* 7.49*   PO2I 91 62* 54* 65* 58*       Telemetry:AFIB    Imaging:  I have personally reviewed the patients radiographs and have reviewed the reports:    XR Results (most recent):  Results from Hospital Encounter encounter on 10/10/22    XR CHEST PORT    Narrative  EXAM: XR CHEST PORT    INDICATION: cvl placement    COMPARISON: Recent prior.     FINDINGS: A single view of the chest demonstrates multifocal bilateral airspace  opacities, right greater than left. Mild improvement in aeration the left upper  lung since prior exam with slight worsening at the left lung base. . Small  bilateral pleural opacities grossly similar. Stable mildly prominent cardiac  silhouette. Stable atherosclerosis. . No acute bone findings. Interval placement  of esophageal probe with tip at the distal esophagus. Interval placement of  endotracheal tube with tip 1.2 cm from henna. Can consider slight retraction. Enteric tube has been placed with tip and sidehole in the proximal stomach. The  distal tip extends towards the GE junction. Right IJ CVL has been placed with  tip at the atrial caval junction. Potential left IJ CVL with tip not clearly  delineated but perhaps over the innominate. Correlate clinically. .    Impression  Multifocal airspace opacities. Support devices as discussed. CT Results (most recent):  Results from [place] encounter on 10/10/22    CT HEAD WO CONT    Narrative  EXAM: CT HEAD WITHOUT CONTRAST. CLINICAL HISTORY/INDICATION:  AMS    COMPARISON: None. TECHNIQUE: Routine axial images have been obtained from skull base to vertex at  5 mm thick slices. All CT scans at this facility are performed using dose  optimization technique as appropriate to a performed exam, to include automated  exposure control, adjustment of the mA and/or kV according to patient's size  (including appropriate matching for site-specific examinations), or use of  iterative reconstruction technique. FINDINGS:    There are no intra nor extra axial fluid collections. There is no hemorrhage. Periventricular and subcortical white matter hypodensities. Chronic small  watershed zone infarct at the left posterior frontal anterior parietal  junctional zone. Chronic lacunar infarct in the left basal ganglia and  subinsular cortex. The paranasal sinuses which are included on this exam are  well aerated and unremarkable. Impression  No acute finding. Atrophy.   Chronic left watershed and lacunar infarcts                 Kevin Dario, DO  10/13/22  [facility] Pulmonary & Critical Care Medicine Fellow      Attending Note:  I saw and evaluated the patient, performing the key elements of the service. I discussed the findings, assessment and plan with the fellow and agree with the fellow's findings and plan as documented in the fellow's note above. All edits and changes made above or are mentioned in my attending note which was independently assessed as well as written. Total of 46 min critical care time spent at bedside (personally) during the course of care providing evaluation,management and care decisions and ordering appropriate treatment related to critical care problems exclusive of procedures. The reason for providing this level of medical care for this critically ill patient was due a critical illness that impaired one or more vital organ systems such that there was a high probability of imminent or life threatening deterioration in the patients condition. This care involved high complexity decision making to assess, manipulate, and support vital system functions, to treat this degree vital organ system failure and to prevent further life threatening deterioration of the patients condition. This time was independent of other practitioners. 79 y/o female with PMH of MI, CVA, combined systolic/diastolic CHF, a-fib, presented to [facility] brought in for SOB. Pt reported associated LE swelling. Pt was found to be very hypoxic, thought to be from CHF vs CAP. Pt was given ABx and admitted. Pt eventually led to needing HFNC and then bipap, then RRT was called due to severe hypoxia and tachypnea. Pt was emerg[facility] Pt was cardizem and BB for a-fib with RVR --- these were stopped and still pt unfortunately went under cardiogenic shock.   CXR shows extensive infiltrates due to pulm edema --patient has acute kidney injury, however not severe, patient failed attempts at diuresis with Lasix followed by Bumex bolus with drip despite increasing doses. I consulted nephrology today, spoke with Dr. Joe Tellez, initially attempted additional increase in Bumex with no improvement, patient net positive although renal function appears to be intact. Discussed that patient will need further UF removal, so agreed that patient should be started on CRRT. Femoral dialysis catheter placed, patient started on CRRT this evening, will initially start at no UF, and then increase UF as tolerated. Serial electrolytes per protocol. ELY is likely due to ATN versus cardiorenal syndrome  Cardiogenic shock, continue vasopressors, placed central line and concentrated to limit fluid intake. Pt also has anemia, no visible GI bleed, likely dilutional, but will continue to monitor. Pt also has elevated MCV, likely also contributing to mixed anemia. Acute encephalopathy -- toxic metabolic in nature, sedation as tolerated while attempting to diurese and improve oxgyenation. Continue supportive care.      Prognosis guarded    Ketty Gonzalez MD/MPH     Pulmonary, Critical Care Medicine  [facility]

## 2022-10-13 NOTE — PROGRESS NOTES
[address] Pharmacokinetic Monitoring Service - Vancomycin    Consulting Provider: Gilma Lea MD  Indication: Pneumonia (CAP)  Target Concentration:  Dosing based on anticipated concentration <15 mg/L due to renal impairment/insufficiency  Day of Therapy:  3 of 7   Additional Antimicrobials: Piperacillin/Tazobactam    Pertinent Laboratory Values: Wt Readings from Last 1 Encounters:   10/12/22 52 kg (114 lb 10.2 oz)     Temp Readings from Last 1 Encounters:   10/13/22 98.7 °F (37.1 °C)     Recent Labs     10/13/22  0409 10/12/22  2350 10/12/22  0954 10/12/22  0550   CREA 1.36* 1.48* 1.47* 1.27   BUN 36* 36* 31* 27*     No components found for: PROCAL  Estimated Creatinine Clearance: 25.3 mL/min (A) (based on SCr of 1.36 mg/dL (H)). Recent Labs     10/13/22  0409 10/12/22  1329   WBC 17.6* 15.5*       Pertinent Cultures:  Culture Date Source Results   10/10 blood NO GROWTH 2 DAYS   10/10 urine No significant growth, <10,000 CFU/mL   10/12 Sputum Pending   MRSA Nasal Swab: was ordered by provider, awaiting results. .      Assessment:  Date/Time Current Dose Concentration Timing of Concentration (h) AUC   10/11 2152 1000 mg x1   -   10/12 0550 - 7.4 8 N/A   10/12 1211 750 mg x 1      10/13 0409 - 12.1 16    Note: Serum concentrations collected for AUC dosing may appear elevated if collected in close proximity to the dose administered, this is not necessarily an indication of toxicity    Plan:  Vancomycin 750 mg IV x 1  Repeat vancomycin concentration ordered for 10/14 @ 0400   Pharmacy will continue to monitor patient and adjust therapy as indicated    Thank you for the consult,  Ortega Salamanca  10/13/2022 5:21 AM

## 2022-10-13 NOTE — PROGRESS NOTES
CRRT orders placed  CVVH mode .  Indication : ultrafiltration in setting of diuretic resistance / decompensated CHF / worsening respiratory failure   Consent obtained from patients lolita Arabella Matthieu  Please see consult note to follow    Please call with questions,    Violet Arechiga MD [facility]  Cell [phone]  Pager: [phone]

## 2022-10-13 NOTE — CONSULTS
Consult Note      Consult requested by: Corrinne Augusta, MD    ADMIT DATE: 10/10/2022    CONSULT DATE: October 13, 2022           Admission diagnosis: respiratory failure   Reason for Nephrology Consultation: ELY    Assessment and plan:  #1 nonoliguric ELY, secondary to ischemic ATN versus cardiorenal syndrome, patient is volume overloaded in the setting of decompensated CHF with systolic diastolic and RV dysfunction. Needs diuresis versus ultrafiltration, so far high-dose diuretics have not helped with diuresis. With worsening vent settings, chest x-ray suggestive of worsening pulmonary edema versus ARDS we will start patient on CVVH and ultrafiltration  #2 acute respiratory failure secondary to decompensated CHF and pulmonary edema, with worsening vent settings ARDS cannot be ruled out, also being treated for pneumonia  #3 decompensated congestive heart failure, severe systolic and diastolic dysfunction, also has RV dysfunction  #4 lactic acidosis, improved  #5 anemia  #6 SIRS, may be secondary to pneumonia  #7 A. fib with RVR  #8 urinary tract infection    Plan:  #1 strict intake output charting Valverde catheter in place  #2 start CVVH, consent obtained from patient's son, indication, fluid overload in the setting of cardiogenic shock  Also significant diuretic resistance.   Goal will be to achieve net even in 4 hours and then subsequently based on how patient tolerates ultrafiltration  #3 we will stop patient's Bumex drip when CVVH is started  #4 labs per CRRT protocol  #5 management of A. fib and RVR per primary team  #6 trend lactic acid  #7 antibiotic management per primary team, renally dose antibiotics for clearance equal into CVVH dose  Of more than 30    Discussed plan with primary team    Please call with questions,    Violet Arechiga MD [facility]  Cell [phone]  Pager: [phone]     HPI: Ryanne Chaudhry is a 78 y.o. female [address] [facility] [place]  with past medical history of coronary artery disease s/p MI, CVA, systolic and diastolic CHF, cardiomyopathy with EF of 20 to 25%, decreased RV function who presented to the hospital with shortness of breath and lower extremity edema. She was found to and was diagnosed with CHF exacerbation and community-acquired pneumonia, started on antibiotics. She had worsening respiratory failure and was intubated and transferred to the ICU. Chest x-ray showed worsening pulmonary edema, overall appears fluid overloaded. Lactic acid which was elevated had improved with improvement in blood pressures. Severe tachycardia and A. fib and patient was started on amiodarone drip, Bumex drip for diuresis but diuresis has been suboptimal.  Nephrology was therefore consulted to help with diuresis versus ultrafiltration in the setting of possible diuretic resistance.      Past Medical History:   Diagnosis Date    A-fib [facility])     CAD (coronary artery disease)     Heart Failure    Chronic systolic CHF (congestive heart failure) ([facility])     LVEF 20-25% & Abnormal Diastolic Function by TTE on 6/03/2022    Hypertension     MI (myocardial infarction) ([address]     Osteoporosis     Stroke [facility])       Past Surgical History:   Procedure Laterality Date    HX BACK SURGERY      [place] CARDIAC SURG PROCEDURE UNLIST      stents placed at [facility]       Social History     Socioeconomic History    Marital status:      Spouse name: Not on file    Number of children: Not on file    Years of education: Not on file    Highest education level: Not on file   Occupational History    Not on file   Tobacco Use    Smoking status: Former    Smokeless tobacco: Never   Substance and Sexual Activity    Alcohol use: No    Drug use: No    Sexual activity: Not on file   Other Topics Concern    Not on file   Social History Narrative    Not on file     Social Determinants of Health     Financial Resource Strain: Not on file   Food Insecurity: Not on file   Transportation Needs: Not on file   Physical Activity: Not on file   Stress: Not on file   Social Connections: Not on file   Intimate Partner Violence: Not on file   Housing Stability: Not on file       No family history on file. No Known Allergies     Home Medications:     Medications Prior to Admission   Medication Sig    ramipriL (ALTACE) 1.25 mg capsule Take 1 Capsule by mouth in the morning. furosemide (LASIX) 40 mg tablet Take 1 Tablet by mouth two (2) times a day. Please dispense 40 mg BID 60 tablet for 30 days    midodrine (PROAMATINE) 2.5 mg tablet Take 2.5 mg by mouth two (2) times a day. memantine (NAMENDA) 10 mg tablet Take 10 mg by mouth two (2) times a day. ascorbic acid, vitamin C, (VITAMIN C) 250 mg tablet Take 250 mg by mouth daily. fluticasone furoate-vilanteroL (Breo Ellipta) 100-25 mcg/dose inhaler Take 1 Puff by inhalation daily. calcium-cholecalciferol, d3, 600-125 mg-unit tab Take  by mouth.    cyanocobalamin 1,000 mcg tablet Take 1,000 mcg by mouth daily. ferrous sulfate 325 mg (65 mg iron) tablet Take 325 mg by mouth Daily (before breakfast). atorvastatin (LIPITOR) 80 mg tablet Take 80 mg by mouth nightly. clonazePAM (KLONOPIN) 0.5 mg tablet Take 0.5 mg by mouth nightly as needed. clopidogrel (PLAVIX) 75 mg tab Take 75 mg by mouth daily. pantoprazole (PROTONIX) 40 mg tablet Take 40 mg by mouth daily. escitalopram oxalate (LEXAPRO) 5 mg tablet Take 15 mg by mouth daily. aspirin delayed-release 81 mg tablet Take  by mouth daily. metoprolol (LOPRESSOR) 100 mg tablet Take 25 mg by mouth three (3) times daily.        Current Inpatient Medications:     Current Facility-Administered Medications   Medication Dose Route Frequency    pantoprazole (PROTONIX) 40 mg in 0.9% sodium chloride 10 mL injection  40 mg IntraVENous DAILY    [START ON 10/14/2022] vancomycin (VANCOCIN) 750 mg in 0.9% sodium chloride 250 mL (VIAL-MATE)  750 mg IntraVENous Q24H    bicarbonate dialysis (PRISMASOL) BG K 4/Ca 2.5 5000 ml solution   Extracorporeal DIALYSIS CONTINUOUS    bicarbonate dialysis (PRISMASOL) BG K 4/Ca 2.5 5000 ml solution   Extracorporeal DIALYSIS CONTINUOUS    chlorhexidine (PERIDEX) 0.12 % mouthwash 10 mL  10 mL Oral Q12H    sodium chloride (NS) flush 5-40 mL  5-40 mL IntraVENous PRN    fentaNYL (PF) 1,500 mcg/30 mL (50 mcg/mL) infusion  0-200 mcg/hr IntraVENous TITRATE    insulin glargine (LANTUS) injection 4 Units  4 Units SubCUTAneous DAILY    insulin lispro (HUMALOG) injection   SubCUTAneous Q6H    glucose chewable tablet 16 g  4 Tablet Oral PRN    glucagon (GLUCAGEN) injection 1 mg  1 mg IntraMUSCular PRN    dextrose 10% infusion 0-250 mL  0-250 mL IntraVENous PRN    dexmedeTOMidine in 0.9 % NaCl (PRECEDEX) 400 mcg/100 mL (4 mcg/mL) infusion soln  0.1-1.5 mcg/kg/hr IntraVENous TITRATE    fentaNYL citrate (PF) injection 100 mcg  100 mcg IntraVENous Q2H PRN    midazolam (VERSED) injection 1 mg  1 mg IntraVENous Q2H PRN    heparin (porcine) 25,000 units in 0.45% saline 250 ml infusion  18-36 Units/kg/hr IntraVENous TITRATE    bumetanide (BUMEX) 12.5 mg in 0.9% sodium chloride 100 mL infusion  2 mg/hr IntraVENous CONTINUOUS    NOREPINephrine (LEVOPHED) 32,000 mcg in dextrose 5% 250 mL (128 mcg/mL) infusion  0.5-30 mcg/min IntraVENous TITRATE    amiodarone (NEXTERONE) 360 mg in dextrose 200 mL (1.8 mg/mL) infusion  1 mg/min IntraVENous TITRATE    aspirin delayed-release tablet 81 mg  81 mg Oral DAILY    atorvastatin (LIPITOR) tablet 80 mg  80 mg Oral QHS    [Held by provider] clopidogreL (PLAVIX) tablet 75 mg  75 mg Oral DAILY    cyanocobalamin tablet 1,000 mcg  1,000 mcg Oral DAILY    [Held by provider] escitalopram oxalate (LEXAPRO) tablet 15 mg  15 mg Oral DAILY    ferrous sulfate tablet 325 mg  325 mg Oral ACB    memantine (NAMENDA) tablet 10 mg  10 mg Oral BID    [Held by provider] metoprolol tartrate (LOPRESSOR) tablet 25 mg  25 mg Oral TID    [Held by provider] lisinopriL (PRINIVIL, ZESTRIL) tablet 5 mg  5 mg Oral DAILY    albuterol-ipratropium (DUO-NEB) 2.5 MG-0.5 MG/3 ML  3 mL Nebulization Q4H RT    piperacillin-tazobactam (ZOSYN) 3.375 g in 0.9% sodium chloride (MBP/ADV) 100 mL MBP  3.375 g IntraVENous Q8H    acetaminophen (TYLENOL) tablet 650 mg  650 mg Oral Q6H PRN    midodrine (PROAMATINE) tablet 10 mg  10 mg Oral TID    nitroglycerin (NITROBID) 2 % ointment 1 Inch  1 Inch Topical Q6H PRN    albuterol-ipratropium (DUO-NEB) 2.5 MG-0.5 MG/3 ML  3 mL Nebulization Q6H PRN     Facility-Administered Medications Ordered in Other Encounters   Medication Dose Route Frequency    etomidate (AMIDATE) 2 mg/mL injection   IntraVENous PRN    succinylcholine (ANECTINE) 20 mg/mL syringe   IntraVENous PRN       Review of Systems:   Intubated /sedated      Physical Assessment:     Vitals:    10/13/22 1400 10/13/22 1500 10/13/22 1528 10/13/22 1600   BP: 107/88 103/82  (!) 101/49   Pulse: (!) 141 (!) 135 (!) 163    Resp: 12 11 12 14   Temp:    97.8 °F (36.6 °C)   SpO2: 94% 94% 96% 93%   Weight:       Height:         Last 3 Recorded Weights in this Encounter    10/10/22 1734 10/11/22 2205 10/12/22 1205   Weight: 59 kg (130 lb) 56.3 kg (124 lb 1.6 oz) 52 kg (114 lb 10.2 oz)     Admission weight: Weight: 59 kg (130 lb) (10/10/22 1734)      Intake/Output Summary (Last 24 hours) at 10/13/2022 1646  Last data filed at 10/13/2022 1600  Gross per 24 hour   Intake 3392.49 ml   Output 782 ml   Net 2610.49 ml     Intubated/ sedated  Patient is in no apparent distress. HEENT: moist mucosa  Lungs: yamil rales, decreased in bases  Cardiovascular system: S1, S2, regular rate and rhythm. Abdomen: soft, non tender, non distended.   Extremities: 1+ ext edema       Data Review:    Labs: Results:       Chemistry Recent Labs     10/13/22  0924 10/13/22  0409 10/12/22  2350 10/11/22  1956 10/10/22  1741   * 137* 163*   < > 194*    142 138   < > 140   K 4.1 3.6 3.9   < > 4.5    109 106   < > 108   CO2 25 23 21   < > 22   BUN 35* 36* 36*   < > 29*   CREA 1.42* 1.36* 1.48*   < > 1.30   CA 8.8 8.8 8.7   < > 8.1*   AGAP 6 10 11   < > 10   BUCR 25* 26* 24*   < > 22*   AP  --   --   --   --  99   TP  --   --   --   --  6.2*   ALB  --   --   --   --  3.1*   GLOB  --   --   --   --  3.1   AGRAT  --   --   --   --  1.0   PHOS 3.4 3.6 3.7   < >  --     < > = values in this interval not displayed. CBC w/Diff Recent Labs     10/13/22  0409 10/12/22  1329 10/12/22  0954   WBC 17.6* 15.5* 17.5*   RBC 2.89* 2.68* 2.91*   HGB 9.6* 8.8* 9.6*   HCT 29.6* 28.0* 30.4*    164 193   GRANS 85* 85* 94*   LYMPH 8* 9* 2*   EOS 0 0 0         Iron/Ferritin No results for input(s): IRON in the last 72 hours. No lab exists for component: TIBCCALC   PTH/VIT D No results for input(s): PTH in the last 72 hours.     No lab exists for component: VITD           Eveline Cook MD  10/13/2022  4:46 PM      October 13, 2022

## 2022-10-13 NOTE — PROGRESS NOTES
10/12/22:  1930: Assumed care of patient after report. Drips verified. Orders reviewed. 2100: Claudia Paulino NP and Dr. Ricardo Perez at bedside and updated - orders received. 2335: Patient's -160's A fib RVR - amiodarone bolus and drip ordered and hung per Dorinda THOMAS.    10/13/22:  2857: Patient's -170 A fib RVR - digoxin 250 mcg ordered and given per Dorinda THOMAS.    0730: Bedside and Verbal shift change report given to George Horton RN (oncoming nurse) by Binu Garvin RN (offgoing nurse). Report included the following information SBAR, Intake/Output, MAR, Recent Results, and Cardiac Rhythm A fib .

## 2022-10-13 NOTE — INTERDISCIPLINARY ROUNDS
[facility]  Pulmonary, Critical Care, and Sleep Medicine  Interdisciplinary and Ventilator Weaning Rounds    Patient discussed in morning walking rounds and interdisciplinary rounds. ICU day: 10/12    Overnight events:   No acute over night events    Assessments and best practice:  Ventilator  Ventilator day 10/12  Vent settings: FiO2 of 80 and PEEP of 12  VAP bundle, aim to keep peak plateau pressure 00-99EE H2O  Weaning assessed and documented   Patient does not meet criteria for SBT. Patient is not on sedation holiday. Plan to wean with PS N/A. Sedation  Precedex and Fentanyl   Other pertinent drips  Heparin, Amiodarone, and bumex  Lines noted  Villeda Catheter  Critical labs assessed  Yes  Antibiotics  Zosyn and Vancomycin  Medications reviewed  Yes  Pending imaging  none  Pending send out labs  Yes  Pending Procedures  None  Glycemic control  Stress ulcer prophylaxis. Protonix  DVT prophylaxis. SQH  Need for Lines, villeda assessed.   Yes  Restraint Reevaluation     Restraints not needed      Family contact/MPOA: children, [facility]pe and [facility]  Family updated yesterday by providers    Palliative consult within 3 days of admission to ICU-  Ethics Guidance: 21 days      Daily Plans:  Nephrology consulted  Advance TF  Increase water flushes to 150 q6    HAYDEN time 15 minutes        Ayna Stewart PA-C  10/13/22  Pulmonary, [address] [place] [address],[facility]

## 2022-10-13 NOTE — PROGRESS NOTES
CRRT /      Orders   Mode: CVVH   Blood Flow Rate: 200   Prismasol Dose:    Prismasol Concentrate:    Blood Warmer Temp: 37   Net Fluid Removal: 0     Metrics   BP: 132/80   HR: 128   Access Pressure: -77   Filter Pressure: 157   Return Pressure: 103   TMP: 87   Pressure Drop: 38     Access   Type & Location: Right side femoral CVC   Comments:                                        Labs   HBsAg (Antigen) / date:       unknown                                        HBsAb (Antibody) / date: unknown   Source:      Safety:   Time Out Done:   (Time) 1769   Consent obtained/signed: yes   Education: yes   Primary Nurse Rpt Pre: KIRSTY Roa RN   Primary Nurse Rpt Post: KIRSTY Roa RN     Comments / Plan:      CRRT initial day start w/o complications

## 2022-10-13 NOTE — PROGRESS NOTES
attended the interdisciplinary rounds for Victorino Matta, who is a 78 y.o.,female. Patients Primary Language is: [place]. According to the patients EMR Holiness Affiliation is: Congregation. The reason the Patient came to the hospital is:   Patient Active Problem List    Diagnosis Date Noted    UTI (urinary tract infection) 10/11/2022    Pneumonia 10/10/2022    Carotid artery disease ([facility] 09/29/2022    Fluid overload 07/18/2022    Acute on chronic systolic and diastolic heart failure, NYHA class 4 ([facility] [address] 07/17/2022    CHF (congestive heart failure) ([facility] [address] 06/02/2022        Plan:  Chaplains will continue to follow and will provide pastoral care on an as needed/requested basis.  recommends bedside caregivers page  on duty if patient shows signs of acute spiritual or emotional distress.     [address] [facility]  Board Certified [facility]   [phone]

## 2022-10-13 NOTE — PROGRESS NOTES
Problem: Pressure Injury - Risk of  Goal: *Prevention of pressure injury  Description: Document Sushant Scale and appropriate interventions in the flowsheet. Outcome: Progressing Towards Goal  Note: Pressure Injury Interventions:  Sensory Interventions: Assess changes in LOC, Assess need for specialty bed, Avoid rigorous massage over bony prominences, Check visual cues for pain, Float heels, Keep linens dry and wrinkle-free, Maintain/enhance activity level, Minimize linen layers, Monitor skin under medical devices, Pad between skin to skin, Pressure redistribution bed/mattress (bed type), Turn and reposition approx. every two hours (pillows and wedges if needed)    Moisture Interventions: Absorbent underpads, Apply protective barrier, creams and emollients, Assess need for specialty bed, Check for incontinence Q2 hours and as needed, Internal/External urinary devices, Maintain skin hydration (lotion/cream), Minimize layers, Moisture barrier    Activity Interventions: Pressure redistribution bed/mattress(bed type)    Mobility Interventions: Assess need for specialty bed, Float heels, HOB 30 degrees or less, Pressure redistribution bed/mattress (bed type), Turn and reposition approx.  every two hours(pillow and wedges)    Nutrition Interventions: Document food/fluid/supplement intake    Friction and Shear Interventions: Apply protective barrier, creams and emollients, Foam dressings/transparent film/skin sealants, HOB 30 degrees or less, Lift sheet, Minimize layers, Transferring/repositioning devices                Problem: Ventilator Management  Goal: *Adequate oxygenation and ventilation  Outcome: Progressing Towards Goal  Goal: *Patient maintains clear airway/free of aspiration  Outcome: Progressing Towards Goal  Goal: *Absence of infection signs and symptoms  Outcome: Progressing Towards Goal  Goal: *Normal spontaneous ventilation  Outcome: Progressing Towards Goal     Problem: Patient Education: Go to Patient Education Activity  Goal: Patient/Family Education  Outcome: Progressing Towards Goal

## 2022-10-14 NOTE — PROGRESS NOTES
10/14/22 0515   Vent Settings   FIO2 (%) (S)  50 %   Vt Set (ml) (S)  430 ml     Settings adjusted post AM ABG, per Karyle Louder, NP.

## 2022-10-14 NOTE — PROGRESS NOTES
No SBT at this time     10/14/22 0713   Weaning Parameters   Spontaneous Breathing Trial Complete No (Comments)  (High PEEP and FiO2, no cough or gag)

## 2022-10-14 NOTE — INTERDISCIPLINARY ROUNDS
[address] [place] [address] [facility]  Pulmonary, Critical Care, and Sleep Medicine  Interdisciplinary and Ventilator Weaning Rounds    Patient discussed in morning walking rounds and interdisciplinary rounds. ICU day: 10/12    Overnight events: Tolerated CRRT over night    Assessments and best practice:  Ventilator  Ventilator day 10/12  Vent settings: FiO2 of 50 and PEEP of 14  VAP bundle, aim to keep peak plateau pressure [id number] H2O  Weaning assessed and documented   Patient does not meet criteria for SBT. Patient is not on sedation holiday. Plan to wean with PS N/A. Sedation  Precedex and Fentanyl   Other pertinent drips  Heparin, Amiodarone, and levophed  Lines noted  Villeda Catheter  Critical labs assessed  Yes  Antibiotics  Zosyn and Vancomycin  Medications reviewed  Yes  Pending imaging  none  Pending send out labs  Yes  Pending Procedures  None  Glycemic control  Stress ulcer prophylaxis. Protonix  DVT prophylaxis. SQH  Need for Lines, villeda assessed.   Yes  Restraint Reevaluation     Restraints not needed      Family contact/MPOA: children, Mansi [place] and Maryam  Family updated yesterday by providers    Palliative consult within 3 days of admission to ICU-  Ethics Guidance: 21 days      Daily Plans:  Continue CRRT per nephrology  Increase lantus to 6  Repeat BCx given persistent white count  Obtain CT chest/abd/pelvis    HAYDEN time 14 minutes        Wing Franc PA-C  10/14/22  Pulmonary, [address] [facility] [facility]

## 2022-10-14 NOTE — PROGRESS NOTES
10/14/22 0347   Patient Observations   Pulse (Heart Rate) (!) 147   Resp Rate 20   O2 Sat (%) 100 %   Airway - Endotracheal Tube 10/12/22 Oral   Placement Date/Time: 10/12/22 (c) 3274   Number of Attempts: 1  Location: Oral  Placement Verified: Auscultation;BBS;EtCO2  Airway Types: Endotracheal, cuffed  Airway Tube Size: 7.5 mm  Technique: Direct Laryngoscopy  Blade Type: Youssef  Anesthesia ET. .. Insertion Depth (cm) 21 cm   Line Иван Lips   Side Secured Right   Site Assessment Clean, dry, & intact   Respiratory   Patient on Vent Yes - If patient is on vent, add Doc Flowsheet Ventilator (). Respiratory Pattern Irregular   Chest/Tracheal Assessment Chest expansion, symmetrical   Breath Sounds Bilateral Diminished   Vent Settings   FIO2 (%) 80 %   SpO2/FIO2 Ratio 125   CMV Rate Set 18   Back-Up Rate 18   Vt Set (ml) 400 ml   PEEP/VENT (cm H2O) 14 cm H20   Insp Time (sec) 1 sec   Insp Rise Time % 50 %   Flow Trigger 2   Ventilator Measurements   Resp Rate Observed 20   Vt Exhaled (Machine Breath) (ml) 490 ml   Ve Observed (l/min) 7.54 l/min   PIP Observed (cm H2O) 26 cm H2O   MAP (cm H2O) 18   I:E Ratio Actual 1:2.3   Vent Method/Mode   Ventilation Method Conventional   Ventilator Mode Assist control   $$ Ventilator Subsequent Subsequent Vent Day       Settings adjusted per Rose Kelly NP due to pt asynchrony with ventilator.

## 2022-10-14 NOTE — PROGRESS NOTES
[address] [facility] Pharmacokinetic Monitoring Service - Vancomycin    Indication: CAP  Goal AUC/NIKITA: 400-600 mg*hr/L  Day of Therapy: 4  Additional Antimicrobials: pip-tazo    Pertinent Laboratory Values: Wt Readings from Last 1 Encounters:   10/14/22 55.5 kg (122 lb 5.7 oz)     Temp Readings from Last 1 Encounters:   10/14/22 (!) 95.8 °F (35.4 °C)     No components found for: PROCAL  Estimated Creatinine Clearance: 26.2 mL/min (A) (based on SCr of 1.32 mg/dL (H)).   Recent Labs     10/14/22  0817 10/14/22  0001   WBC 19.3* 21.7*     Pertinent Cultures:  Culture Date Source Results   10/10 blood NGTD   10/10 urine NGF   MRSA Nasal Swab: negative on 10/12    Assessment:  Date/Time Current Dose Concentration (mg/L) Timing of Concentration (h) AUC   10/11 1,000 mg x1 - - -   10/12 750 mg x1 7.4 8 -   10/13 750 mg q24h 12.1 16 533   10/14 750 mg q24h - - -   Note: Serum concentrations collected for AUC dosing may appear elevated if collected in close proximity to the dose administered, this is not necessarily an indication of toxicity    Plan:  Started on CVVH - dose by level  DC scheduled dose of 750 mg q24h  Ordered a level for 10/15 with AM labs  Pharmacy will continue to monitor patient and adjust therapy as indicated    Thank you for the consult,  ADRI Herrera  10/14/2022

## 2022-10-14 NOTE — PROGRESS NOTES
[facility]  Pulmonary, Critical Care, and Sleep Medicine    Name: Francoise Winkler MRN: [id number]   : 1940 Hospital: [address] [place] [address]   Date: 10/14/2022        Critical Care Progress Note      IMPRESSION:   Acute hypoxic respiratory failure requiring mechanical ventilation  CHF exacerbation - combined systolic and diastolic dysfunction, last EF  2022 was 20-25%  Severe hypervolemia requiring CRRT  Acute pulmonary edema  SIRS:  no current source of sepsis  Leukocytosis   Lactic acidosis  COPD  CAD - stents placed 2021  A-fib with RVR  Shock:  Cardiogenic secondary to medications including CCB and BB  Macrocytic anemia   Hyperlipidemia  UTI  H/o MI  H/o CVA - aphasia  H/o GERD  H/o anxiety     Patient Active Problem List   Diagnosis Code    CHF (congestive heart failure) ([facility]) I50.9    Acute on chronic systolic and diastolic heart failure, NYHA class 4 ([facility]) I50.43    Fluid overload E87.70    Carotid artery disease ([facility]) I77.9    Pneumonia J18.9    UTI (urinary tract infection) N39.0        RECOMMENDATIONS:   Neuro: Titrate sedation to RASS of 0 to -1. PRN for breakthrough sedation needs. H/o aphasia following CVA. Pulm: Titrate FiO2 for goal SPO2> 90%,VAP prevention bundle. Daily sedation holiday and assessment for weaning with SBT as tolerated. PRN duonebs. Aspiration precautions, Keep HOB >30 degrees. Pulmonary edema 2/2 hypervolemia. Oxygenation improving with CRRT. CVS : Continuous cardiac monitoring, titrate levophed to maintain MAP >65mmHg. Echo 10/12 showed EF 20-25% (similar to ). Heparin gtt for A-fib RVR. CRRT initiated yesterday with improvement in oxygen requirements. GI: tube feeds  Renal: Strict I/Os. ELY vs ATN, ?cardiorenal syndrome. CRRT with -50 to -100 UF. Hem/Onc: Monitor for s/o active bleeding. pTT daily while on heparin. I/D: On vanc and zosy, however she is hypothermic with leukocytosis not significantly improving.  Will order CT chest/abd/pelvis and repeat blood cx. Endocrine: Q6 glucoses, SSI. Metabolic:  Daily BMP, mag, phos. Trend lytes, replace as needed. Musc/Skin: no acute issues, wound care    Full Code  Discussed during interdisciplinary rounds. Best practice : APPLICABLE TO PATIENT    Glycemic control  IHI ICU bundles: [place] Bundle Followed and Vent Bundle Followed, Vent Day 10/12     [facility] Vent patients-    VAP bundle-[place] tube to suction at 20-30 cm Hg, Maintain [place] tube with 5-10ml air every 4 hours, Routine oral care every 4 hours, Elevation of head > 45 degree, Daily sedation holiday and SBT evaluation starting at 6.00am. and [facility] network Guidelines: Lung protective strategy and Plateau  Pressure goal < 30 cm H2O goals, Oxygenation Goals PaO2 55-80 mm Hg or SaO2 88-95%, PH goal 7.30-7.45  Sress ulcer prophylaxis. Protonix  DVT prophylaxis. Heparin gtt  Need for Lines, villeda assessed. Palliative care evaluation. Restraints need. Restraint evaluation     I have reevaluated the patient after initiation of intervention. The patient is comfortable, uninjured, and is not posing a risk to themselves, staff or others. The restraints have been tolerated well. The patient's current medical and behavioral conditions that warrant the use intervention include danger to self and Interference with medical equipment or treatment. Restraint or seclusion will be discontinued at the earliest possible time, regardless of the scheduled expiration of the order. Based on my evaluation, restraints will be continued: yes        Subjective/History:     10/14/22    Patient is a 78 y.o. female with PMH MI, CVA, systolic and diastolic CHF, COPD, A-fib, CAD admitted to [facility] on 10/10/22 via EMS c/o SOB and pedal edema. She was found to be hypoxic in CHF exacerbation and dx of CAP, started on ABX and admitted.  She unfortunately was getting half of her home dose of lasix orally despite her CHF exacerbation with BNP greater than 20k. Per chart review, on 10/11 rapid response was called for acute respiratory distress with O2 sats 70s to low 80s, on NC and NRB. With no improvement in oxygenation she was switched to HFNC then BIPAP. The morning of 10/12 she was found to be tachypneic with  respiratory alkalosis and worsening SpO2. She was intubated and transferred to ICU. CXR consistent with pulmonary edema. She was also in Afib RVR and was on a diltiazem drip. This was stopped upon arrival to the ICU as her rate was well controlled at that time and she was becoming hypotensive. Started on levophed and central line placed on 10/12. The patient is critically ill and can not provide additional history due to sedation/mechanical ventilation. Overnight Events:  -temporary dialysis catheter placed and CRRT started  -oxygen requirements decreasing with fluid removal  -HR still tachy but improved since past 2 days      Past Medical History:   Diagnosis Date    A-fib ([facility])     CAD (coronary artery disease)     Heart Failure    Chronic systolic CHF (congestive heart failure) ([facility])     LVEF 20-25% & Abnormal Diastolic Function by TTE on 6/03/2022    Hypertension     MI (myocardial infarction) ([facility] [address]     Osteoporosis     Stroke [facility])         Past Surgical History:   Procedure Laterality Date    HX BACK SURGERY      GA CARDIAC SURG PROCEDURE UNLIST      stents placed at [facility]        Prior to Admission medications    Medication Sig Start Date End Date Taking? Authorizing Provider   ramipriL (ALTACE) 1.25 mg capsule Take 1 Capsule by mouth in the morning. 7/22/22   Angel Cedeno MD   furosemide (LASIX) 40 mg tablet Take 1 Tablet by mouth two (2) times a day. Please dispense 40 mg BID 60 tablet for 30 days 7/21/22   Angel Cedeno MD   midodrine (PROAMATINE) 2.5 mg tablet Take 2.5 mg by mouth two (2) times a day. Provider, Historical   memantine (NAMENDA) 10 mg tablet Take 10 mg by mouth two (2) times a day.     Provider, Historical ascorbic acid, vitamin C, (VITAMIN C) 250 mg tablet Take 250 mg by mouth daily. Billy Wilcox MD   fluticasone furoate-vilanteroL (Breo Ellipta) 100-25 mcg/dose inhaler Take 1 Puff by inhalation daily. Billy Wilcox MD   calcium-cholecalciferol, d3, 600-125 mg-unit tab Take  by mouth. Billy Wilcox MD   cyanocobalamin 1,000 mcg tablet Take 1,000 mcg by mouth daily. Billy Wilcox MD   ferrous sulfate 325 mg (65 mg iron) tablet Take 325 mg by mouth Daily (before breakfast). Billy Wilcox MD   atorvastatin (LIPITOR) 80 mg tablet Take 80 mg by mouth nightly. Billy Wilcox MD   clonazePAM (KLONOPIN) 0.5 mg tablet Take 0.5 mg by mouth nightly as needed. Billy Wilcox MD   clopidogrel (PLAVIX) 75 mg tab Take 75 mg by mouth daily. Billy Wilcox MD   pantoprazole (PROTONIX) 40 mg tablet Take 40 mg by mouth daily. Billy Wilcox MD   escitalopram oxalate (LEXAPRO) 5 mg tablet Take 15 mg by mouth daily. Billy Wilcox MD   aspirin delayed-release 81 mg tablet Take  by mouth daily. Billy Wilcox MD   metoprolol (LOPRESSOR) 100 mg tablet Take 25 mg by mouth three (3) times daily.     Billy Wilcox MD       Current Facility-Administered Medications   Medication Dose Route Frequency    pantoprazole (PROTONIX) 40 mg in 0.9% sodium chloride 10 mL injection  40 mg IntraVENous DAILY    vancomycin (VANCOCIN) 750 mg in 0.9% sodium chloride 250 mL (VIAL-MATE)  750 mg IntraVENous Q24H    bicarbonate dialysis (PRISMASOL) BG K 4/Ca 2.5 5000 ml solution   Extracorporeal DIALYSIS CONTINUOUS    bicarbonate dialysis (PRISMASOL) BG K 4/Ca 2.5 5000 ml solution   Extracorporeal DIALYSIS CONTINUOUS    chlorhexidine (PERIDEX) 0.12 % mouthwash 10 mL  10 mL Oral Q12H    fentaNYL (PF) 1,500 mcg/30 mL (50 mcg/mL) infusion  0-200 mcg/hr IntraVENous TITRATE    insulin glargine (LANTUS) injection 4 Units  4 Units SubCUTAneous DAILY    insulin lispro (HUMALOG) injection   SubCUTAneous Q6H    dexmedeTOMidine in 0.9 % NaCl (PRECEDEX) 400 mcg/100 mL (4 mcg/mL) infusion soln  0.1-1.5 mcg/kg/hr IntraVENous TITRATE    heparin (porcine) 25,000 units in 0.45% saline 250 ml infusion  18-36 Units/kg/hr IntraVENous TITRATE    NOREPINephrine (LEVOPHED) 32,000 mcg in dextrose 5% 250 mL (128 mcg/mL) infusion  0.5-30 mcg/min IntraVENous TITRATE    amiodarone (NEXTERONE) 360 mg in dextrose 200 mL (1.8 mg/mL) infusion  1 mg/min IntraVENous TITRATE    aspirin delayed-release tablet 81 mg  81 mg Oral DAILY    atorvastatin (LIPITOR) tablet 80 mg  80 mg Oral QHS    [Held by provider] clopidogreL (PLAVIX) tablet 75 mg  75 mg Oral DAILY    cyanocobalamin tablet 1,000 mcg  1,000 mcg Oral DAILY    [Held by provider] escitalopram oxalate (LEXAPRO) tablet 15 mg  15 mg Oral DAILY    ferrous sulfate tablet 325 mg  325 mg Oral ACB    memantine (NAMENDA) tablet 10 mg  10 mg Oral BID    [Held by provider] metoprolol tartrate (LOPRESSOR) tablet 25 mg  25 mg Oral TID    [Held by provider] lisinopriL (PRINIVIL, ZESTRIL) tablet 5 mg  5 mg Oral DAILY    albuterol-ipratropium (DUO-NEB) 2.5 MG-0.5 MG/3 ML  3 mL Nebulization Q4H RT    piperacillin-tazobactam (ZOSYN) 3.375 g in 0.9% sodium chloride (MBP/ADV) 100 mL MBP  3.375 g IntraVENous Q8H    midodrine (PROAMATINE) tablet 10 mg  10 mg Oral TID       No Known Allergies     Social History     Tobacco Use    Smoking status: Former    Smokeless tobacco: Never   Substance Use Topics    Alcohol use: No        No family history on file. Review of Systems:  Review of systems not obtained due to patient factors.     Objective:   Vital Signs:    Visit Vitals  BP 98/75   Pulse (!) 130   Temp (!) 95.8 °F (35.4 °C)   Resp 20   Ht 5' 3\" (1.6 m)   Wt 55.5 kg (122 lb 5.7 oz)   SpO2 100%   Breastfeeding No   BMI 21.67 kg/m²       O2 Device: Endotracheal tube, Ventilator   O2 Flow Rate (L/min): 50 l/min   Temp (24hrs), Av.3 °F (36.3 °C), Min:95.8 °F (35.4 °C), Max:98.9 °F (37.2 °C)       Intake/Output:   Last shift:      No intake/output data recorded. Last 3 shifts: 10/12 1901 - 10/14 0700  In: 4614 [I.V.:3104]  Out: 2862 [Urine:837]    Intake/Output Summary (Last 24 hours) at 10/14/2022 9292  Last data filed at 10/14/2022 0700  Gross per 24 hour   Intake 3032.25 ml   Output 2420 ml   Net 612.25 ml           Physical Exam:     General:  Intubated, sedated   Head:  Normocephalic, without obvious abnormality, atraumatic. Eyes:  Conjunctivae/corneas clear. PERRL,   Nose: Nares normal. Septum midline. Mucosa normal.        Neck: Supple, symmetrical, trachea midline       Lungs:   Scattered rales bilateral lung fields   Chest wall:  No deformity. Heart:  Irregular RR tachycardic   Abdomen:   Soft, non-tender. Bowel sounds normal. No masses. Extremities: Extremities normal, atraumatic, no cyanosis. 2+ pitting edema yamil LEs   Pulses: 2+ and symmetric all extremities.    Skin: Skin color, texture, turgor normal. No rashes or lesions       Neurologic: Grossly nonfocal     Devices:  ETT  OGT  Central line  [place]  Temporary dialysis catheter    Data:     Recent Results (from the past 24 hour(s))   LACTIC ACID    Collection Time: 10/13/22  9:24 AM   Result Value Ref Range    Lactic acid 2.0 0.4 - 2.0 MMOL/L   MAGNESIUM    Collection Time: 10/13/22  9:24 AM   Result Value Ref Range    Magnesium 2.2 1.6 - 2.6 mg/dL   METABOLIC PANEL, BASIC    Collection Time: 10/13/22  9:24 AM   Result Value Ref Range    Sodium 139 136 - 145 mmol/L    Potassium 4.1 3.5 - 5.5 mmol/L    Chloride 108 100 - 111 mmol/L    CO2 25 21 - 32 mmol/L    Anion gap 6 3.0 - 18 mmol/L    Glucose 149 (H) 74 - 99 mg/dL    BUN 35 (H) 7.0 - 18 MG/DL    Creatinine 1.42 (H) 0.6 - 1.3 MG/DL    BUN/Creatinine ratio 25 (H) 12 - 20      eGFR 36 (L) >60 ml/min/1.73m2    Calcium 8.8 8.5 - 10.1 MG/DL   PHOSPHORUS    Collection Time: 10/13/22  9:24 AM   Result Value Ref Range    Phosphorus 3.4 2.5 - 4.9 MG/DL   GLUCOSE, POC    Collection Time: 10/13/22 10:56 AM   Result Value Ref Range    Glucose (POC) 150 (H) 70 - 110 mg/dL   MAGNESIUM    Collection Time: 10/13/22  4:50 PM   Result Value Ref Range    Magnesium 2.2 1.6 - 2.6 mg/dL   RENAL FUNCTION PANEL    Collection Time: 10/13/22  4:50 PM   Result Value Ref Range    Sodium 142 136 - 145 mmol/L    Potassium 4.6 3.5 - 5.5 mmol/L    Chloride 109 100 - 111 mmol/L    CO2 22 21 - 32 mmol/L    Anion gap 11 3.0 - 18 mmol/L    Glucose 150 (H) 74 - 99 mg/dL    BUN 36 (H) 7.0 - 18 MG/DL    Creatinine 1.49 (H) 0.6 - 1.3 MG/DL    BUN/Creatinine ratio 24 (H) 12 - 20      eGFR 34 (L) >60 ml/min/1.73m2    Calcium 8.0 (L) 8.5 - 10.1 MG/DL    Phosphorus 3.3 2.5 - 4.9 MG/DL    Albumin 2.3 (L) 3.4 - 5.0 g/dL   RENAL FUNCTION PANEL    Collection Time: 10/13/22  8:06 PM   Result Value Ref Range    Sodium 139 136 - 145 mmol/L    Potassium 4.4 3.5 - 5.5 mmol/L    Chloride 107 100 - 111 mmol/L    CO2 19 (L) 21 - 32 mmol/L    Anion gap 13 3.0 - 18 mmol/L    Glucose 146 (H) 74 - 99 mg/dL    BUN 32 (H) 7.0 - 18 MG/DL    Creatinine 1.56 (H) 0.6 - 1.3 MG/DL    BUN/Creatinine ratio 21 (H) 12 - 20      eGFR 33 (L) >60 ml/min/1.73m2    Calcium 8.1 (L) 8.5 - 10.1 MG/DL    Phosphorus 3.6 2.5 - 4.9 MG/DL    Albumin 2.3 (L) 3.4 - 5.0 g/dL   MAGNESIUM    Collection Time: 10/13/22  8:06 PM   Result Value Ref Range    Magnesium 2.3 1.6 - 2.6 mg/dL   CALCIUM, IONIZED    Collection Time: 10/13/22  8:06 PM   Result Value Ref Range    Ionized Calcium 1.07 (L) 1.15 - 1.33 MMOL/L   CBC WITH AUTOMATED DIFF    Collection Time: 10/14/22 12:01 AM   Result Value Ref Range    WBC 21.7 (H) 4.6 - 13.2 K/uL    RBC 2.78 (L) 4.20 - 5.30 M/uL    HGB 9.1 (L) 12.0 - 16.0 g/dL    HCT 29.5 (L) 35.0 - 45.0 %    .1 (H) 78.0 - 100.0 FL    MCH 32.7 24.0 - 34.0 PG    MCHC 30.8 (L) 31.0 - 37.0 g/dL    RDW 21.5 (H) 11.6 - 14.5 %    PLATELET 435 196 - 300 K/uL    MPV 10.6 9.2 - 11.8 FL    NRBC 0.1 (H) 0  WBC    ABSOLUTE NRBC 0.02 (H) 0.00 - 0.01 K/uL    NEUTROPHILS 87 (H) 40 - 73 %    BAND NEUTROPHILS 5 0 - 5 % LYMPHOCYTES 1 (L) 21 - 52 %    MONOCYTES 7 3 - 10 %    EOSINOPHILS 0 0 - 5 %    BASOPHILS 0 0 - 2 %    IMMATURE GRANULOCYTES 0 %    ABS. NEUTROPHILS 20.0 (H) 1.8 - 8.0 K/UL    ABS. LYMPHOCYTES 0.2 (L) 0.9 - 3.6 K/UL    ABS. MONOCYTES 1.5 (H) 0.05 - 1.2 K/UL    ABS. EOSINOPHILS 0.0 0.0 - 0.4 K/UL    ABS. BASOPHILS 0.0 0.0 - 0.1 K/UL    ABS. IMM.  GRANS. 0.0 K/UL    DF MANUAL      PLATELET COMMENTS ADEQUATE PLATELETS      RBC COMMENTS MACROCYTOSIS  1+        RBC COMMENTS POLYCHROMASIA  1+       RENAL FUNCTION PANEL    Collection Time: 10/14/22 12:01 AM   Result Value Ref Range    Sodium 139 136 - 145 mmol/L    Potassium 4.5 3.5 - 5.5 mmol/L    Chloride 107 100 - 111 mmol/L    CO2 18 (L) 21 - 32 mmol/L    Anion gap 14 3.0 - 18 mmol/L    Glucose 146 (H) 74 - 99 mg/dL    BUN 31 (H) 7.0 - 18 MG/DL    Creatinine 1.47 (H) 0.6 - 1.3 MG/DL    BUN/Creatinine ratio 21 (H) 12 - 20      eGFR 35 (L) >60 ml/min/1.73m2    Calcium 8.3 (L) 8.5 - 10.1 MG/DL    Phosphorus 3.8 2.5 - 4.9 MG/DL    Albumin 2.5 (L) 3.4 - 5.0 g/dL   MAGNESIUM    Collection Time: 10/14/22 12:01 AM   Result Value Ref Range    Magnesium 2.4 1.6 - 2.6 mg/dL   CALCIUM, IONIZED    Collection Time: 10/14/22 12:01 AM   Result Value Ref Range    Ionized Calcium 1.12 (L) 1.15 - 1.33 MMOL/L   GLUCOSE, POC    Collection Time: 10/14/22 12:09 AM   Result Value Ref Range    Glucose (POC) 142 (H) 70 - 110 mg/dL   LACTIC ACID    Collection Time: 10/14/22  4:11 AM   Result Value Ref Range    Lactic acid 2.9 (HH) 0.4 - 2.0 MMOL/L   RENAL FUNCTION PANEL    Collection Time: 10/14/22  4:11 AM   Result Value Ref Range    Sodium 138 136 - 145 mmol/L    Potassium 4.2 3.5 - 5.5 mmol/L    Chloride 105 100 - 111 mmol/L    CO2 21 21 - 32 mmol/L    Anion gap 12 3.0 - 18 mmol/L    Glucose 182 (H) 74 - 99 mg/dL    BUN 28 (H) 7.0 - 18 MG/DL    Creatinine 1.37 (H) 0.6 - 1.3 MG/DL    BUN/Creatinine ratio 20 12 - 20      eGFR 38 (L) >60 ml/min/1.73m2    Calcium 8.8 8.5 - 10.1 MG/DL    Phosphorus 3.2 2.5 - 4.9 MG/DL    Albumin 2.9 (L) 3.4 - 5.0 g/dL   MAGNESIUM    Collection Time: 10/14/22  4:11 AM   Result Value Ref Range    Magnesium 2.5 1.6 - 2.6 mg/dL   PTT    Collection Time: 10/14/22  4:11 AM   Result Value Ref Range    aPTT 146.4 (HH) 23.0 - 36.4 SEC   BLOOD GAS, ARTERIAL POC    Collection Time: 10/14/22  5:05 AM   Result Value Ref Range    Device: ADULT VENT      FIO2 (POC) 80 %    pH (POC) 7.28 (L) 7.35 - 7.45      pCO2 (POC) 42.1 35.0 - 45.0 MMHG    pO2 (POC) 209 (H) 80 - 100 MMHG    HCO3 (POC) 19.9 (L) 22 - 26 MMOL/L    sO2 (POC) 99.6 (H) 92 - 97 %    Base deficit (POC) 6.4 mmol/L    Mode BILEVEL      Tidal volume 400 ml    Set Rate 18 bpm    PEEP/CPAP (POC) 14 cmH2O    Allens test (POC) Negative      Site LEFT RADIAL      Specimen type (POC) ARTERIAL      Performed by Nuris Vazquez    GLUCOSE, POC    Collection Time: 10/14/22  6:19 AM   Result Value Ref Range    Glucose (POC) 191 (H) 70 - 110 mg/dL           Recent Labs     10/14/22  0505 10/13/22  0333 10/12/22  1440 10/12/22  0706 10/11/22  2357   FIO2I 80 90  --   --  60   HCO3I 19.9* 19.4* 17.4*   < > 19.5*   PCO2I 42.1 32.9* 23.5*   < > 23.6*   PHI 7.28* 7.38 7.48*   < > 7.53*   PO2I 209* 91 62*   < > 65*    < > = values in this interval not displayed. Telemetry:AFIB    Imaging:  I have personally reviewed the patients radiographs and have reviewed the reports:    XR Results (most recent):  Results from Hospital Encounter encounter on 10/10/22    XR ABD (KUB)    Narrative  EXAM: XR ABD (KUB)    INDICATION: temp HD catheter placement - question of placement    COMPARISON: Radiograph 10/12/2022. FINDINGS: Supine view of the abdomen was obtained    Right femoral double-lumen catheter with tip projecting over the spine and right  to midline at L2 level, expected location of IVC. Nonobstructive bowel gas pattern. Decreased gaseous distention of small bowel  loops.  No gross free intraperitoneal air on this limited exam. Presumed urinary  catheter overlies the lower pelvis. Diffuse osteopenia and prior vertebral  augmentation in the thoracolumbar spine. Impression  Right femoral dialysis catheter tip overlies the IVC at L2 level. CT Results (most recent):  Results from [place] encounter on 10/10/22    CT HEAD WO CONT    Narrative  EXAM: CT HEAD WITHOUT CONTRAST. CLINICAL HISTORY/INDICATION:  AMS    COMPARISON: None. TECHNIQUE: Routine axial images have been obtained from skull base to vertex at  5 mm thick slices. All CT scans at this facility are performed using dose  optimization technique as appropriate to a performed exam, to include automated  exposure control, adjustment of the mA and/or kV according to patient's size  (including appropriate matching for site-specific examinations), or use of  iterative reconstruction technique. FINDINGS:    There are no intra nor extra axial fluid collections. There is no hemorrhage. Periventricular and subcortical white matter hypodensities. Chronic small  watershed zone infarct at the left posterior frontal anterior parietal  junctional zone. Chronic lacunar infarct in the left basal ganglia and  subinsular cortex. The paranasal sinuses which are included on this exam are  well aerated and unremarkable. Impression  No acute finding. Atrophy.   Chronic left watershed and lacunar infarcts                 Chelly Sharp DO  10/14/22  [employer] [facility] [employer] [facility] Pulmonary & Critical Care Medicine Fellow

## 2022-10-14 NOTE — PROGRESS NOTES
Problem: Falls - Risk of  Goal: *Absence of Falls  Description: Document Kylie Litter Fall Risk and appropriate interventions in the flowsheet. Outcome: Progressing Towards Goal  Note: Fall Risk Interventions:       Mentation Interventions: Adequate sleep, hydration, pain control, Bed/chair exit alarm, Door open when patient unattended, Evaluate medications/consider consulting pharmacy, Eyeglasses and hearing aids    Medication Interventions: Assess postural VS orthostatic hypotension, Bed/chair exit alarm, Evaluate medications/consider consulting pharmacy, Patient to call before getting OOB    Elimination Interventions: Elevated toilet seat, Call light in reach, Bed/chair exit alarm, Patient to call for help with toileting needs, Stay With Me (per policy)              Problem: Patient Education: Go to Patient Education Activity  Goal: Patient/Family Education  Outcome: Progressing Towards Goal     Problem: Pressure Injury - Risk of  Goal: *Prevention of pressure injury  Description: Document Sushant Scale and appropriate interventions in the flowsheet.   Outcome: Progressing Towards Goal  Note: Pressure Injury Interventions:  Sensory Interventions: Assess changes in LOC, Assess need for specialty bed, Avoid rigorous massage over bony prominences, Chair cushion, Check visual cues for pain, Discuss PT/OT consult with provider    Moisture Interventions: Apply protective barrier, creams and emollients, Absorbent underpads, Assess need for specialty bed, Check for incontinence Q2 hours and as needed, Contain wound drainage    Activity Interventions: Increase time out of bed, Chair cushion, Assess need for specialty bed, Pressure redistribution bed/mattress(bed type)    Mobility Interventions: Assess need for specialty bed, Float heels, HOB 30 degrees or less, Chair cushion    Nutrition Interventions: Offer support with meals,snacks and hydration, Document food/fluid/supplement intake, Discuss nutritional consult with provider    Friction and Shear Interventions: Apply protective barrier, creams and emollients, Feet elevated on foot rest, HOB 30 degrees or less, Lift sheet, Foam dressings/transparent film/skin sealants, Lift team/patient mobility team, Minimize layers

## 2022-10-14 NOTE — ROUTINE PROCESS
Bedside and Verbal shift change report given to Temo Valverde RN (oncoming nurse) by Tana Gongora RN   (offgoing nurse). Report included the following information SBAR, Kardex, Intake/Output, MAR, Recent Results, Med Rec Status, and Cardiac Rhythm AFIB .

## 2022-10-14 NOTE — DIABETES MGMT
Diabetes/ Glycemic Control Plan of Care  Recommendations:   Recommend increasing Lantus to 6 units q 24 hrs. Assessment: BG  readings above target range. DX:   1. Hypoxia    2. Community acquired pneumonia of right lower lobe of lung    3. Acute on chronic systolic congestive heart failure (HCC)       Fasting/ Morning blood glucose:   Lab Results   Component Value Date/Time    Glucose 202 (H) 10/14/2022 08:17 AM    Glucose (POC) 191 (H) 10/14/2022 06:19 AM     IV Fluids containing dextrose: none  Steroids:  n/a    Within target range (70-180mg/dL):  no  Current insulin orders:4 units Lantus plus 4 units lispro  Total Daily Dose previous 24 hours = 8 units  Current A1c:   Lab Results   Component Value Date/Time    Hemoglobin A1c 5.6 10/12/2022 09:54 AM      equivalent  to ave Blood Glucose of 108 mg/dl for 2-3 months prior to admission  Adequate glycemic control PTA: yes  Nutrition/Diet: npo -Glucerna 1.5 TF to start  Home diabetes medications: none    Plan/Goals:   Blood glucose will be within target of 70 - 180 mg/dl within 72 hours  Reinforce dietary and medication compliance at home if indicated.             Education:  [] Refer to Diabetes Education Record                       [x] Education not indicated at this time -vent    Vonda Puckett RD BC-ADM

## 2022-10-14 NOTE — PROGRESS NOTES
Problem: Falls - Risk of  Goal: *Absence of Falls  Description: Document Ashleigh Duval Fall Risk and appropriate interventions in the flowsheet. Outcome: Progressing Towards Goal  Note: Fall Risk Interventions:       Mentation Interventions: Adequate sleep, hydration, pain control, Bed/chair exit alarm, Door open when patient unattended, More frequent rounding, Room close to nurse's station, Toileting rounds    Medication Interventions: Bed/chair exit alarm, Evaluate medications/consider consulting pharmacy    Elimination Interventions: Bed/chair exit alarm, Call light in reach, Patient to call for help with toileting needs, Stay With Me (per policy), Toileting schedule/hourly rounds              Problem: Pressure Injury - Risk of  Goal: *Prevention of pressure injury  Description: Document Sushant Scale and appropriate interventions in the flowsheet. Outcome: Progressing Towards Goal  Note: Pressure Injury Interventions:  Sensory Interventions: Assess changes in LOC, Assess need for specialty bed, Avoid rigorous massage over bony prominences, Check visual cues for pain, Float heels, Keep linens dry and wrinkle-free, Monitor skin under medical devices, Pad between skin to skin, Pressure redistribution bed/mattress (bed type), Turn and reposition approx. every two hours (pillows and wedges if needed)    Moisture Interventions: Absorbent underpads, Apply protective barrier, creams and emollients, Assess need for specialty bed, Check for incontinence Q2 hours and as needed, Internal/External urinary devices, Moisture barrier    Activity Interventions: Assess need for specialty bed, Increase time out of bed, Pressure redistribution bed/mattress(bed type), PT/OT evaluation    Mobility Interventions: HOB 30 degrees or less, Float heels, Pressure redistribution bed/mattress (bed type), Suspension boots, Turn and reposition approx.  every two hours(pillow and wedges)    Nutrition Interventions: Document food/fluid/supplement intake, Discuss nutritional consult with provider    Friction and Shear Interventions: Apply protective barrier, creams and emollients, Foam dressings/transparent film/skin sealants, HOB 30 degrees or less

## 2022-10-14 NOTE — PROGRESS NOTES
attended the interdisciplinary rounds for Victorino Matta, who is a 78 y.o.,female. Patients Primary Language is: [place]. According to the patients EMR Zoroastrianism Affiliation is: [employer]ian. The reason the Patient came to the hospital is:   Patient Active Problem List    Diagnosis Date Noted    UTI (urinary tract infection) 10/11/2022    Pneumonia 10/10/2022    Carotid artery disease ([facility] [address] 09/29/2022    Fluid overload 07/18/2022    Acute on chronic systolic and diastolic heart failure, NYHA class 4 ([facility] [address] 07/17/2022    CHF (congestive heart failure) ([facility] [address] 06/02/2022      Plan:  Chaplains will continue to follow and will provide pastoral care on an as needed/requested basis.  recommends bedside caregivers page  on duty if patient shows signs of acute spiritual or emotional distress.     [address] [facility]  Board Certified [address] [facility]   [phone]

## 2022-10-14 NOTE — PROGRESS NOTES
RENAL PROGRESS NOTE        Francoise Power         Assessment  - ELY , secondary to CRS from sever CHF / hypotension   - Acute HFrEF / diatsolic dysfunction with RV dysfunction   - Acute Resp failure secondary to CHF   - Lactic acidosis  - Anemia   - A-fib with RVR   - ?  Septic shock     Plan   - Patient on CRRT , she became hypotensive with increase in pressor requirement , Will cut back on UF for now and run even , if BP stabilizes we can increase UF gradually   - Replacing Lytes per protocol   - IV Albumin PRN , Midodrine for BP support   - IV Amiodarone   - IV Abx   - Discussed with PCCM   - Very guarded prognosis                                                                                                                                        Subjective:  Patient intubated on CRRT       Patient Active Problem List   Diagnosis Code    CHF (congestive heart failure) ([facility] [address] I50.9    Acute on chronic systolic and diastolic heart failure, NYHA class 4 ([facility]) I50.43    Fluid overload E87.70    Carotid artery disease ([facility]) I77.9    Pneumonia J18.9    UTI (urinary tract infection) N39.0       Current Facility-Administered Medications   Medication Dose Route Frequency Provider Last Rate Last Admin    albumin human 25% (BUMINATE) solution 12.5 g  12.5 g IntraVENous Q6H PRN Juan Ramon Garsia MD   12.5 g at 10/14/22 1102    VANCOMYCIN INFORMATION NOTE   Other Rx Dosing/Monitoring Nadir Franklin, DO        [START ON 10/15/2022] insulin glargine (LANTUS) injection 6 Units  6 Units SubCUTAneous DAILY Jessica Yuan PA-C        pantoprazole (PROTONIX) 40 mg in 0.9% sodium chloride 10 mL injection  40 mg IntraVENous DAILY Brian DUNHAM NP   40 mg at 10/14/22 0914    bicarbonate dialysis (PRISMASOL) BG K 4/Ca 2.5 5000 ml solution   Extracorporeal DIALYSIS CONTINUOUS Luba Garsia  mL/hr at 10/14/22 0217 New Bag at 10/14/22 0217    bicarbonate dialysis (PRISMASOL) BG K 4/Ca 2.5 5000 ml solution Extracorporeal DIALYSIS CONTINUOUS Bichu, Melyssa Vasquez  mL/hr at 10/14/22 0456 New Bag at 10/14/22 0456    chlorhexidine (PERIDEX) 0.12 % mouthwash 10 mL  10 mL Oral Q12H Pedlowska Farzana, PA-C   10 mL at 10/14/22 0917    sodium chloride (NS) flush 5-40 mL  5-40 mL IntraVENous PRN Pedlowska, Farzana, PA-C        fentaNYL (PF) 1,500 mcg/30 mL (50 mcg/mL) infusion  0-200 mcg/hr IntraVENous TITRATE Pedlowska, Farzana, PA-C 4 mL/hr at 10/14/22 1230 200 mcg/hr at 10/14/22 1230    insulin lispro (HUMALOG) injection   SubCUTAneous Q6H PedlowJaiden craiga, PA-C   2 Units at 10/14/22 1816    glucose chewable tablet 16 g  4 Tablet Oral PRN Pedlowska Farzana, PA-C        glucagon (GLUCAGEN) injection 1 mg  1 mg IntraMUSCular PRN Pedlowska, Farzana, PA-C        dextrose 10% infusion 0-250 mL  0-250 mL IntraVENous PRN Pedlowska, Farzana, PA-C        dexmedeTOMidine in 0.9 % NaCl (PRECEDEX) 400 mcg/100 mL (4 mcg/mL) infusion soln  0.1-1.5 mcg/kg/hr IntraVENous TITRATE Janine Plata MD 16.9 mL/hr at 10/14/22 1824 1.2 mcg/kg/hr at 10/14/22 1824    fentaNYL citrate (PF) injection 100 mcg  100 mcg IntraVENous Q2H PRN Pedlowska, Farzana, PA-C        midazolam (VERSED) injection 1 mg  1 mg IntraVENous Q2H PRN Pedlowska, Farzana, PA-C   1 mg at 10/14/22 0537    [Held by provider] heparin (porcine) 25,000 units in 0.45% saline 250 ml infusion  18-36 Units/kg/hr IntraVENous TITRATE [place] Farzana, PA-C   Held at 10/14/22 1200    NOREPINephrine (LEVOPHED) 32,000 mcg in dextrose 5% 250 mL (128 mcg/mL) infusion  0.5-30 mcg/min IntraVENous TITRATE Demond DUNHAM NP 3.8 mL/hr at 10/14/22 0701 8 mcg/min at 10/14/22 0701    amiodarone (NEXTERONE) 360 mg in dextrose 200 mL (1.8 mg/mL) infusion  1 mg/min IntraVENous TITRATE Demond DUNHAM NP 16.7 mL/hr at 10/14/22 0915 0.5 mg/min at 10/14/22 0915    aspirin delayed-release tablet 81 mg  81 mg Oral DAILY Walt Samuels MD   81 mg at 10/14/22 0914    atorvastatin (LIPITOR) tablet 80 mg  80 mg Oral QHS Maria Dolores Cleomns MD   80 mg at 10/13/22 2110    [Held by provider] clopidogreL (PLAVIX) tablet 75 mg  75 mg Oral DAILY Aleti, Marylene Check, MD        cyanocobalamin tablet 1,000 mcg  1,000 mcg Oral DAILY Maria Dolores Clemons MD   1,000 mcg at 10/14/22 0915    [Held by provider] escitalopram oxalate (LEXAPRO) tablet 15 mg  15 mg Oral DAILY Aleti, Marylene Check, MD   15 mg at 10/12/22 1155    ferrous sulfate tablet 325 mg  325 mg Oral ACB Aleti, Marylene Check, MD   325 mg at 10/14/22 0631    memantine (NAMENDA) tablet 10 mg  10 mg Oral BID Maria Dolores Clemons MD   10 mg at 10/14/22 1802    [Held by provider] metoprolol tartrate (LOPRESSOR) tablet 25 mg  25 mg Oral TID Maria Dolores Clemons MD   25 mg at 10/11/22 1707    [Held by provider] lisinopriL (PRINIVIL, ZESTRIL) tablet 5 mg  5 mg Oral DAILY Maicol Dallas MD        albuterol-ipratropium (DUO-NEB) 2.5 MG-0.5 MG/3 ML  3 mL Nebulization Q4H RT Aleti, Marylene Check, MD   3 mL at 10/14/22 1559    piperacillin-tazobactam (ZOSYN) 3.375 g in 0.9% sodium chloride (MBP/ADV) 100 mL MBP  3.375 g IntraVENous Q8H Aleti, Marylene Check, MD 25 mL/hr at 10/14/22 1803 3.375 g at 10/14/22 1803    acetaminophen (TYLENOL) tablet 650 mg  650 mg Oral Q6H PRN Nelta Netleann, DO        midodrine (PROAMATINE) tablet 10 mg  10 mg Oral TID Javier HOUSTON DO   10 mg at 10/14/22 1802    nitroglycerin (NITROBID) 2 % ointment 1 Inch  1 Inch Topical Q6H PRN Nico Nunez,         albuterol-ipratropium (DUO-NEB) 2.5 MG-0.5 MG/3 ML  3 mL Nebulization Q6H PRN Deykes, Nico E, DO         Facility-Administered Medications Ordered in Other Encounters   Medication Dose Route Frequency Provider Last Rate Last Admin    etomidate (AMIDATE) 2 mg/mL injection   IntraVENous PRN Pineda Snower, CRNA   10 mg at 10/12/22 0738    succinylcholine (ANECTINE) 20 mg/mL syringe   IntraVENous PRN Pineda Varghese CRNA   80 mg at 10/12/22 0738       Objective  Vitals:    10/14/22 1530 10/14/22 1545 10/14/22 1705 10/14/22 1710   BP:  95/77 Pulse: (!) 104 (!) 136 (!) 156    Resp: 25 25 30    Temp:       SpO2: 93% (!) 87% (!) 85% 93%   Weight:       Height:             Intake/Output Summary (Last 24 hours) at 10/14/2022 1850  Last data filed at 10/14/2022 1400  Gross per 24 hour   Intake 2751.67 ml   Output 3277 ml   Net -525.33 ml           Admission weight: Weight: 59 kg (130 lb) (10/10/22 9584)  Last Weight Metrics:  Weight Loss Metrics 10/14/2022 9/29/2022 7/21/2022 6/5/2022 3/12/2022 2/14/2022 1/31/2022   Today's Wt 122 lb 5.7 oz 111 lb 15.9 oz 112 lb 1.6 oz 139 lb 8 oz 140 lb 140 lb 140 lb   BMI 21.67 kg/m2 19.84 kg/m2 19.86 kg/m2 25.51 kg/m2 25.61 kg/m2 24.03 kg/m2 24.03 kg/m2             Physical Assessment:     General: Acutely ill / Intubated on CRRT   Neck: No jvd. LUNGS: Coarse BS B/L   CVS EXM: S1, S2  RRR, no murmurs/gallops/rubs. Abdomen: soft, non tender. Lower Extremities:  ++ edema.     : Valverde catheter       Lab    CBC w/Diff Recent Labs     10/14/22  1805 10/14/22  1252 10/14/22  0817 10/14/22  0001   WBC 11.6 14.9* 19.3* 21.7*   RBC 2.35* 2.40* 2.57* 2.78*   HGB 7.7* 7.7* 8.5* 9.1*   HCT 24.6* 25.1* 27.0* 29.5*    129* 166 173   GRANS  --  85* 90* 87*   LYMPH  --  10* 9* 1*   EOS  --  0 0 0        Chemistry Recent Labs     10/14/22  1805 10/14/22  1252 10/14/22  0817   * 188* 202*    135* 138   K 4.3 4.2 4.4    103 105   CO2 23 25 22   BUN 24* 23* 26*   CREA 1.40* 1.30 1.32*   CA 8.6 9.8 8.1*   AGAP 10 7 11   BUCR 17 18 20   ALB 3.4 3.1* 2.8*   PHOS 2.9 2.9 3.2         No results found for: IRON, FE, TIBC, IBCT, PSAT, FERR   Lab Results   Component Value Date/Time    Calcium 8.6 10/14/2022 06:05 PM    Phosphorus 2.9 10/14/2022 06:05 PM          Emerson Frazier MD  10/14/2022  6:50 PM

## 2022-10-15 NOTE — PROGRESS NOTES
No SBT at this time-     10/15/22 0811   Weaning Parameters   Spontaneous Breathing Trial Complete No (Comments)  (High PEEP and FiO2)

## 2022-10-15 NOTE — PROGRESS NOTES
[facility] Pharmacokinetic Monitoring Service - Vancomycin    Consulting Provider: Yolanda Garcia MD  Indication: Pneumonia (CAP)  Target Concentration:  Dosing based on anticipated concentration <15 mg/L. CVVH  Day of Therapy:  5 of 7   Additional Antimicrobials: Piperacillin/Tazobactam    Pertinent Laboratory Values: Wt Readings from Last 1 Encounters:   10/12/22 52 kg (114 lb 10.2 oz)     Temp Readings from Last 1 Encounters:   10/13/22 98.7 °F (37.1 °C)     Recent Labs     10/13/22  0409 10/12/22  2350 10/12/22  0954 10/12/22  0550   CREA 1.36* 1.48* 1.47* 1.27   BUN 36* 36* 31* 27*     No components found for: PROCAL  Estimated Creatinine Clearance: 25.3 mL/min (A) (based on SCr of 1.36 mg/dL (H)). Recent Labs     10/13/22  0409 10/12/22  1329   WBC 17.6* 15.5*       Pertinent Cultures:  Culture Date Source Results   10/10 blood NO GROWTH 2 DAYS   10/10 urine No significant growth, <10,000 CFU/mL   10/12 Sputum Pending   MRSA Nasal Swab: was ordered by provider, awaiting results. .      Assessment:  Date/Time Current Dose Concentration Timing of Concentration (h) AUC   10/11 2152 1000 mg x1     -   10/12 0550 - 7.4 8 N/A   10/12 1211 750 mg x 1         10/13 0409 - 12.1 16     10/13 0613 750 mg IV x 1      10/14 0530 750 mg x 1      10/14 1252 - 19     10/14 1800 500 mg x 1      10/15 0204 - 21.1 8    Note: Serum concentrations collected for AUC dosing may appear elevated if collected in close proximity to the dose administered, this is not necessarily an indication of toxicity    Plan:  Vancomycin dose not needed now  Repeat vancomycin concentration ordered for 10/16 @ 0400   Pharmacy will continue to monitor patient and adjust therapy as indicated    Thank you for the consult,  ADRI Parada  10/13/2022 5:21 AM

## 2022-10-15 NOTE — INTERDISCIPLINARY ROUNDS
[facility]  Pulmonary, Critical Care, and Sleep Medicine  Interdisciplinary and Ventilator Weaning Rounds    Patient discussed in morning walking rounds and interdisciplinary rounds. ICU day: 10/12    Overnight events:   Increasing pressor requirements  Increasing leukocytosis  Anemia, heparin drip held. Transition to [facility] if HIT panel negative    Assessments and best practice:  Ventilator  Ventilator day 10/12  Vent settings: FiO2 of 50 and PEEP of 14  VAP bundle, aim to keep peak plateau pressure 35-64PC H2O  Weaning assessed and documented   Patient does not meet criteria for SBT. Patient is not on sedation holiday. Plan to wean with PS N/A. Sedation  Precedex and Fentanyl   Other pertinent drips  Amiodarone, levophed  Lines noted  Villeda Catheter, RIJ central line, R fem temp dialysis cath  Critical labs assessed  Yes  Antibiotics  Zosyn and Vancomycin  Medications reviewed  Yes  Pending imaging  none  Pending send out labs  Yes  Pending Procedures  Arterial line today  Glycemic control  Stress ulcer prophylaxis. Protonix  DVT prophylaxis. N/a  Need for Lines, villeda assessed.   Yes  Restraint Reevaluation   Restraints not needed    Family contact/MPOA: children, Ben [place] and Maryam  Family updated yesterday by providers    Palliative consult within 3 days of admission to ICU-  Ethics Guidance: 21 days    Daily Plans:  Continue CRRT per nephrology - decreased fluid removal to net even today  Consult ID - recs merrem and eraxis, CTA when stable for transport  Albumin 25% x 1  Fungitell and respiratory cultures added  Vasopressin added PRN  Fentanyl capped at 150  HIT and DIC panel sent  Cardiology consult  A line placement  Add stress dose steroids Q 6    HAYDEN time 14 minutes    DESTINI Flynn  10/15/22  Pulmonary, Critical Care Medicine  [facility]

## 2022-10-15 NOTE — PROGRESS NOTES
[facility]  Pulmonary, Critical Care, and Sleep Medicine    Name: Ivan Montiel MRN: [id number]   : 1940 Hospital: [address] [place] [address] Dr   Date: 10/15/2022        Critical Care Progress Note    IMPRESSION:   Acute hypoxic respiratory failure requiring mechanical ventilation  CHF exacerbation - combined systolic and diastolic dysfunction, last EF  2022 was 20-25%  Severe hypervolemia requiring CRRT  Acute pulmonary edema  SIRS:  no current source of sepsis  Leukocytosis   Lactic acidosis  COPD  CAD - stents placed 2021  A-fib with RVR  Shock:  Cardiogenic secondary to medications including CCB and BB  Macrocytic anemia   Hyperlipidemia  UTI  H/o MI  H/o CVA - aphasia  H/o GERD  H/o anxiety     Patient Active Problem List   Diagnosis Code    CHF (congestive heart failure) ([facility]) I50.9    Acute on chronic systolic and diastolic heart failure, NYHA class 4 ([facility]) I50.43    Fluid overload E87.70    Carotid artery disease ([facility]) I77.9    Pneumonia J18.9    UTI (urinary tract infection) N39.0      RECOMMENDATIONS:   Neuro: Titrate sedation to RASS of 0 to -1. PRN for breakthrough sedation needs. H/o aphasia following CVA. Pulm: Titrate FiO2 for goal SPO2> 90%,VAP prevention bundle. Daily sedation holiday and assessment for weaning with SBT as tolerated. PRN duonebs. Aspiration precautions, Keep HOB >30 degrees. Pulmonary edema 2/2 hypervolemia. Oxygenation improving with CRRT. CVS : Continuous cardiac monitoring, titrate levophed to maintain MAP >65mmHg. Echo 10/12 showed EF 20-25% (similar to ). Heparin gtt for A-fib RVR. CRRT initiated with improvement in oxygen requirements. GI: tube feeds    Renal: Strict I/Os. ELY vs ATN, ?cardiorenal syndrome. CRRT with -50 to -100 UF. Hem/Onc: Monitor for s/o active bleeding. pTT daily while on heparin. I/D: On vanc and zosy, however she is hypothermic with leukocytosis not significantly improving.  Repeat blood cx 10/14, CT 10/14. Endocrine: Q6 glucoses, SSI. Metabolic:  Daily BMP, mag, phos. Trend lytes, replace as needed. Musc/Skin: no acute issues, wound care    Full Code  Discussed during interdisciplinary rounds. Best practice : APPLICABLE TO PATIENT    Glycemic control  IHI ICU bundles: Villeda Bundle Followed and Vent Bundle Followed, Vent Day 10/12     [facility] Vent patients-    VAP bundle-Ramsay tube to suction at 20-30 cm Hg, Maintain Ramsay tube with 5-10ml air every 4 hours, Routine oral care every 4 hours, Elevation of head > 45 degree, Daily sedation holiday and SBT evaluation starting at 6.00am. and [facility] network Guidelines: Lung protective strategy and Plateau  Pressure goal < 30 cm H2O goals, Oxygenation Goals PaO2 55-80 mm Hg or SaO2 88-95%, PH goal 7.30-7.45  Sress ulcer prophylaxis. Protonix  DVT prophylaxis. Heparin gtt  Need for Lines, villeda assessed. Palliative care evaluation. Restraints need. Restraint evaluation     I have reevaluated the patient after initiation of intervention. The patient is comfortable, uninjured, and is not posing a risk to themselves, staff or others. The restraints have been tolerated well. The patient's current medical and behavioral conditions that warrant the use intervention include danger to self and Interference with medical equipment or treatment. Restraint or seclusion will be discontinued at the earliest possible time, regardless of the scheduled expiration of the order. Based on my evaluation, restraints will be continued: yes    Subjective/History:     10/15/22    Patient is a 78 y.o. female with PMH MI, CVA, systolic and diastolic CHF, COPD, A-fib, CAD admitted to [facility] on 10/10/22 via EMS c/o SOB and pedal edema. She was found to be hypoxic in CHF exacerbation and dx of CAP, started on ABX and admitted. She unfortunately was getting half of her home dose of lasix orally despite her CHF exacerbation with BNP greater than 20k.    Per chart review, on 10/11 rapid response was called for acute respiratory distress with O2 sats 70s to low 80s, on NC and NRB. With no improvement in oxygenation she was switched to HFNC then BIPAP. The morning of 10/12 she was found to be tachypneic with  respiratory alkalosis and worsening SpO2. She was intubated and transferred to ICU. CXR consistent with pulmonary edema. She was also in Afib RVR and was on a diltiazem drip. This was stopped upon arrival to the ICU as her rate was well controlled at that time and she was becoming hypotensive. Started on levophed and central line placed on 10/12. Afib rhythm has resumed with rates staying in the 120s-130s despite amiodarone drip. CT obtained 10/14/22 demonstrates infectious v inflammatory process in bilateral lower lungs, possible acalculous cholecystitis v edema secondary to volume overload, sigmoid diverticulosis and mesenteric edema. The patient is critically ill and can not provide additional history due to sedation/mechanical ventilation. Overnight Events:  -CRRT - consider decreasing fluid removal due to worsening hemodynamics  -Continued tachycardia with increasing pressor requirements this AM, amnio increased this AM  - consult ID due to worsening leukocytosis    Past Medical History:   Diagnosis Date    A-fib ([facility])     CAD (coronary artery disease)     Heart Failure    Chronic systolic CHF (congestive heart failure) ([facility])     LVEF 20-25% & Abnormal Diastolic Function by TTE on 6/03/2022    Hypertension     MI (myocardial infarction) ([facility] [address]     Osteoporosis     Stroke [facility])         Past Surgical History:   Procedure Laterality Date    HX BACK SURGERY      NM CARDIAC SURG PROCEDURE UNLIST      stents placed at [facility]        Prior to Admission medications    Medication Sig Start Date End Date Taking?  Authorizing Provider   ramipriL (ALTACE) 1.25 mg capsule Take 1 Capsule by mouth in the morning. 7/22/22   Cooper Hi MD   furosemide (LASIX) 40 mg tablet Take 1 Tablet by mouth two (2) times a day. Please dispense 40 mg BID 60 tablet for 30 days 7/21/22   Erica Griffin MD   midodrine (PROAMATINE) 2.5 mg tablet Take 2.5 mg by mouth two (2) times a day. Provider, Historical   memantine (NAMENDA) 10 mg tablet Take 10 mg by mouth two (2) times a day. Provider, Historical   ascorbic acid, vitamin C, (VITAMIN C) 250 mg tablet Take 250 mg by mouth daily. Billy Wilcox MD   fluticasone furoate-vilanteroL (Breo Ellipta) 100-25 mcg/dose inhaler Take 1 Puff by inhalation daily. Billy Wilcox MD   calcium-cholecalciferol, d3, 600-125 mg-unit tab Take  by mouth. Billy Wilcox MD   cyanocobalamin 1,000 mcg tablet Take 1,000 mcg by mouth daily. Billy Wilcox MD   ferrous sulfate 325 mg (65 mg iron) tablet Take 325 mg by mouth Daily (before breakfast). Billy Wilcox MD   atorvastatin (LIPITOR) 80 mg tablet Take 80 mg by mouth nightly. Billy Wilcox MD   clonazePAM (KLONOPIN) 0.5 mg tablet Take 0.5 mg by mouth nightly as needed. Billy Wilcox MD   clopidogrel (PLAVIX) 75 mg tab Take 75 mg by mouth daily. Billy Wilcox MD   pantoprazole (PROTONIX) 40 mg tablet Take 40 mg by mouth daily. Billy Wilcox MD   escitalopram oxalate (LEXAPRO) 5 mg tablet Take 15 mg by mouth daily. Billy Wilcox MD   aspirin delayed-release 81 mg tablet Take  by mouth daily. Billy Wilcox MD   metoprolol (LOPRESSOR) 100 mg tablet Take 25 mg by mouth three (3) times daily.     Billy Wilcox MD       Current Facility-Administered Medications   Medication Dose Route Frequency    VANCOMYCIN INFORMATION NOTE   Other Rx Dosing/Monitoring    insulin glargine (LANTUS) injection 6 Units  6 Units SubCUTAneous DAILY    pantoprazole (PROTONIX) 40 mg in 0.9% sodium chloride 10 mL injection  40 mg IntraVENous DAILY    bicarbonate dialysis (PRISMASOL) BG K 4/Ca 2.5 5000 ml solution   Extracorporeal DIALYSIS CONTINUOUS    bicarbonate dialysis (PRISMASOL) BG K 4/Ca 2.5 5000 ml solution   Extracorporeal DIALYSIS CONTINUOUS    chlorhexidine (PERIDEX) 0.12 % mouthwash 10 mL  10 mL Oral Q12H    fentaNYL (PF) 1,500 mcg/30 mL (50 mcg/mL) infusion  0-200 mcg/hr IntraVENous TITRATE    insulin lispro (HUMALOG) injection   SubCUTAneous Q6H    dexmedeTOMidine in 0.9 % NaCl (PRECEDEX) 400 mcg/100 mL (4 mcg/mL) infusion soln  0.1-1.5 mcg/kg/hr IntraVENous TITRATE    [Held by provider] heparin (porcine) 25,000 units in 0.45% saline 250 ml infusion  18-36 Units/kg/hr IntraVENous TITRATE    NOREPINephrine (LEVOPHED) 32,000 mcg in dextrose 5% 250 mL (128 mcg/mL) infusion  0.5-30 mcg/min IntraVENous TITRATE    amiodarone (NEXTERONE) 360 mg in dextrose 200 mL (1.8 mg/mL) infusion  1 mg/min IntraVENous TITRATE    aspirin delayed-release tablet 81 mg  81 mg Oral DAILY    atorvastatin (LIPITOR) tablet 80 mg  80 mg Oral QHS    [Held by provider] clopidogreL (PLAVIX) tablet 75 mg  75 mg Oral DAILY    cyanocobalamin tablet 1,000 mcg  1,000 mcg Oral DAILY    [Held by provider] escitalopram oxalate (LEXAPRO) tablet 15 mg  15 mg Oral DAILY    ferrous sulfate tablet 325 mg  325 mg Oral ACB    memantine (NAMENDA) tablet 10 mg  10 mg Oral BID    [Held by provider] metoprolol tartrate (LOPRESSOR) tablet 25 mg  25 mg Oral TID    [Held by provider] lisinopriL (PRINIVIL, ZESTRIL) tablet 5 mg  5 mg Oral DAILY    albuterol-ipratropium (DUO-NEB) 2.5 MG-0.5 MG/3 ML  3 mL Nebulization Q4H RT    piperacillin-tazobactam (ZOSYN) 3.375 g in 0.9% sodium chloride (MBP/ADV) 100 mL MBP  3.375 g IntraVENous Q8H    midodrine (PROAMATINE) tablet 10 mg  10 mg Oral TID       No Known Allergies     Social History     Tobacco Use    Smoking status: Former    Smokeless tobacco: Never   Substance Use Topics    Alcohol use: No      No family history on file. Review of Systems:  Review of systems not obtained due to patient factors.     Objective:   Vital Signs:    Visit Vitals  /76   Pulse (!) 123   Temp (!) 95.8 °F (35.4 °C)   Resp 24   Ht 5' 3\" (1.6 m)   Wt 56.1 kg (123 lb 10.9 oz)   SpO2 100%   Breastfeeding No   BMI 21.91 kg/m²       O2 Device: Endotracheal tube, Ventilator   O2 Flow Rate (L/min): 50 l/min   Temp (24hrs), Av.9 °F (36.1 °C), Min:95 °F (35 °C), Max:100.5 °F (38.1 °C)       Intake/Output:   Last shift:      No intake/output data recorded. Last 3 shifts: 10/13 1901 - 10/15 0700  In: 4814.5 [I.V.:2320.5]  Out: 7770 [Urine:65]    Intake/Output Summary (Last 24 hours) at 10/15/2022 0709  Last data filed at 10/15/2022 0600  Gross per 24 hour   Intake 2890.43 ml   Output 2762 ml   Net 128.43 ml     Physical Exam:     General:  Intubated, sedated   Head:  Normocephalic, without obvious abnormality, atraumatic. Eyes:  Conjunctivae/corneas clear. PERRL,   Nose: Nares normal. Septum midline. Mucosa normal.        Neck: Supple, symmetrical, trachea midline       Lungs:   Scattered rales bilateral lung fields   Chest wall:  No deformity. Heart:  Irregular RR tachycardic   Abdomen:   Soft, non-tender. Bowel sounds normal. No masses. Extremities: Extremities normal, atraumatic, no cyanosis. 2+ pitting edema yamil LEs   Pulses: 2+ and symmetric all extremities.    Skin: Skin color, texture, turgor normal. No rashes or lesions       Neurologic: Grossly nonfocal     Devices:  ETT  OGT  R IJ Central line  Valverde  R fem Temporary dialysis catheter  Pia perdue    Data:     Recent Results (from the past 24 hour(s))   CBC WITH AUTOMATED DIFF    Collection Time: 10/14/22  8:17 AM   Result Value Ref Range    WBC 19.3 (H) 4.6 - 13.2 K/uL    RBC 2.57 (L) 4.20 - 5.30 M/uL    HGB 8.5 (L) 12.0 - 16.0 g/dL    HCT 27.0 (L) 35.0 - 45.0 %    .1 (H) 78.0 - 100.0 FL    MCH 33.1 24.0 - 34.0 PG    MCHC 31.5 31.0 - 37.0 g/dL    RDW 21.5 (H) 11.6 - 14.5 %    PLATELET 394 200 - 505 K/uL    MPV 10.7 9.2 - 11.8 FL    NRBC 0.2 (H) 0  WBC    ABSOLUTE NRBC 0.03 (H) 0.00 - 0.01 K/uL    NEUTROPHILS 90 (H) 40 - 73 %    LYMPHOCYTES 9 (L) 21 - 52 %    MONOCYTES 1 (L) 3 - 10 % EOSINOPHILS 0 0 - 5 %    BASOPHILS 0 0 - 2 %    IMMATURE GRANULOCYTES 0 0.0 - 0.5 %    ABS. NEUTROPHILS 17.4 (H) 1.8 - 8.0 K/UL    ABS. LYMPHOCYTES 1.7 0.9 - 3.6 K/UL    ABS. MONOCYTES 0.2 0.05 - 1.2 K/UL    ABS. EOSINOPHILS 0.0 0.0 - 0.4 K/UL    ABS. BASOPHILS 0.0 0.0 - 0.1 K/UL    ABS. IMM.  GRANS. 0.0 0.00 - 0.04 K/UL    DF MANUAL      PLATELET COMMENTS ADEQUATE PLATELETS      RBC COMMENTS NORMOCYTIC, NORMOCHROMIC     LACTIC ACID    Collection Time: 10/14/22  8:17 AM   Result Value Ref Range    Lactic acid 2.7 (HH) 0.4 - 2.0 MMOL/L   RENAL FUNCTION PANEL    Collection Time: 10/14/22  8:17 AM   Result Value Ref Range    Sodium 138 136 - 145 mmol/L    Potassium 4.4 3.5 - 5.5 mmol/L    Chloride 105 100 - 111 mmol/L    CO2 22 21 - 32 mmol/L    Anion gap 11 3.0 - 18 mmol/L    Glucose 202 (H) 74 - 99 mg/dL    BUN 26 (H) 7.0 - 18 MG/DL    Creatinine 1.32 (H) 0.6 - 1.3 MG/DL    BUN/Creatinine ratio 20 12 - 20      eGFR 40 (L) >60 ml/min/1.73m2    Calcium 8.1 (L) 8.5 - 10.1 MG/DL    Phosphorus 3.2 2.5 - 4.9 MG/DL    Albumin 2.8 (L) 3.4 - 5.0 g/dL   MAGNESIUM    Collection Time: 10/14/22  8:17 AM   Result Value Ref Range    Magnesium 2.5 1.6 - 2.6 mg/dL   CALCIUM, IONIZED    Collection Time: 10/14/22  8:17 AM   Result Value Ref Range    Ionized Calcium 1.19 1.15 - 1.33 MMOL/L   LACTIC ACID    Collection Time: 10/14/22 12:52 PM   Result Value Ref Range    Lactic acid 2.1 (HH) 0.4 - 2.0 MMOL/L   RENAL FUNCTION PANEL    Collection Time: 10/14/22 12:52 PM   Result Value Ref Range    Sodium 135 (L) 136 - 145 mmol/L    Potassium 4.2 3.5 - 5.5 mmol/L    Chloride 103 100 - 111 mmol/L    CO2 25 21 - 32 mmol/L    Anion gap 7 3.0 - 18 mmol/L    Glucose 188 (H) 74 - 99 mg/dL    BUN 23 (H) 7.0 - 18 MG/DL    Creatinine 1.30 0.6 - 1.3 MG/DL    BUN/Creatinine ratio 18 12 - 20      eGFR 41 (L) >60 ml/min/1.73m2    Calcium 9.8 8.5 - 10.1 MG/DL    Phosphorus 2.9 2.5 - 4.9 MG/DL    Albumin 3.1 (L) 3.4 - 5.0 g/dL   MAGNESIUM    Collection Time: 10/14/22 12:52 PM   Result Value Ref Range    Magnesium 2.5 1.6 - 2.6 mg/dL   CALCIUM, IONIZED    Collection Time: 10/14/22 12:52 PM   Result Value Ref Range    Ionized Calcium 1.35 (H) 1.15 - 1.33 MMOL/L   PTT    Collection Time: 10/14/22 12:52 PM   Result Value Ref Range    aPTT 111.4 (H) 23.0 - 36.4 SEC   CBC WITH AUTOMATED DIFF    Collection Time: 10/14/22 12:52 PM   Result Value Ref Range    WBC 14.9 (H) 4.6 - 13.2 K/uL    RBC 2.40 (L) 4.20 - 5.30 M/uL    HGB 7.7 (L) 12.0 - 16.0 g/dL    HCT 25.1 (L) 35.0 - 45.0 %    .6 (H) 78.0 - 100.0 FL    MCH 32.1 24.0 - 34.0 PG    MCHC 30.7 (L) 31.0 - 37.0 g/dL    RDW 21.7 (H) 11.6 - 14.5 %    PLATELET 527 (L) 047 - 420 K/uL    MPV 10.4 9.2 - 11.8 FL    NRBC 0.1 (H) 0  WBC    ABSOLUTE NRBC 0.02 (H) 0.00 - 0.01 K/uL    NEUTROPHILS 85 (H) 40 - 73 %    LYMPHOCYTES 10 (L) 21 - 52 %    MONOCYTES 4 3 - 10 %    EOSINOPHILS 0 0 - 5 %    BASOPHILS 0 0 - 2 %    IMMATURE GRANULOCYTES 1 (H) 0.0 - 0.5 %    ABS. NEUTROPHILS 12.7 (H) 1.8 - 8.0 K/UL    ABS. LYMPHOCYTES 1.5 0.9 - 3.6 K/UL    ABS. MONOCYTES 0.6 0.05 - 1.2 K/UL    ABS. EOSINOPHILS 0.0 0.0 - 0.4 K/UL    ABS. BASOPHILS 0.0 0.0 - 0.1 K/UL    ABS. IMM. GRANS. 0.1 (H) 0.00 - 0.04 K/UL    DF AUTOMATED     VANCOMYCIN, RANDOM    Collection Time: 10/14/22 12:52 PM   Result Value Ref Range    Vancomycin, random 19.0 5.0 - 40.0 UG/ML   BLOOD GAS, ARTERIAL POC    Collection Time: 10/14/22  1:14 PM   Result Value Ref Range    Device: ADULT VENT      FIO2 (POC) 50 %    pH (POC) 7.25 (L) 7.35 - 7.45      pCO2 (POC) 53.3 (H) 35.0 - 45.0 MMHG    pO2 (POC) 91 80 - 100 MMHG    HCO3 (POC) 23.3 22 - 26 MMOL/L    sO2 (POC) 95.3 92 - 97 %    Base deficit (POC) 4.2 mmol/L    Mode PRESSURE CONTROL      Set Rate 22 bpm    PEEP/CPAP (POC) 10 cmH2O    PIP (POC) 16      Allens test (POC) Positive      Inspiratory Time 0.65 sec    Total resp.  rate 22      Site RIGHT RADIAL      Specimen type (POC) ARTERIAL      Performed by Jann Garcia LACTIC ACID    Collection Time: 10/14/22  6:05 PM   Result Value Ref Range    Lactic acid 3.5 (HH) 0.4 - 2.0 MMOL/L   RENAL FUNCTION PANEL    Collection Time: 10/14/22  6:05 PM   Result Value Ref Range    Sodium 138 136 - 145 mmol/L    Potassium 4.3 3.5 - 5.5 mmol/L    Chloride 105 100 - 111 mmol/L    CO2 23 21 - 32 mmol/L    Anion gap 10 3.0 - 18 mmol/L    Glucose 170 (H) 74 - 99 mg/dL    BUN 24 (H) 7.0 - 18 MG/DL    Creatinine 1.40 (H) 0.6 - 1.3 MG/DL    BUN/Creatinine ratio 17 12 - 20      eGFR 37 (L) >60 ml/min/1.73m2    Calcium 8.6 8.5 - 10.1 MG/DL    Phosphorus 2.9 2.5 - 4.9 MG/DL    Albumin 3.4 3.4 - 5.0 g/dL   MAGNESIUM    Collection Time: 10/14/22  6:05 PM   Result Value Ref Range    Magnesium 2.4 1.6 - 2.6 mg/dL   CALCIUM, IONIZED    Collection Time: 10/14/22  6:05 PM   Result Value Ref Range    Ionized Calcium 1.25 1.15 - 1.33 MMOL/L   CBC W/O DIFF    Collection Time: 10/14/22  6:05 PM   Result Value Ref Range    WBC 11.6 4.6 - 13.2 K/uL    RBC 2.35 (L) 4.20 - 5.30 M/uL    HGB 7.7 (L) 12.0 - 16.0 g/dL    HCT 24.6 (L) 35.0 - 45.0 %    .7 (H) 78.0 - 100.0 FL    MCH 32.8 24.0 - 34.0 PG    MCHC 31.3 31.0 - 37.0 g/dL    RDW 21.6 (H) 11.6 - 14.5 %    PLATELET 021 308 - 238 K/uL    MPV 10.5 9.2 - 11.8 FL    NRBC 0.2 (H) 0  WBC    ABSOLUTE NRBC 0.02 (H) 0.00 - 0.01 K/uL   GLUCOSE, POC    Collection Time: 10/14/22  6:15 PM   Result Value Ref Range    Glucose (POC) 164 (H) 70 - 110 mg/dL   LACTIC ACID    Collection Time: 10/14/22 10:03 PM   Result Value Ref Range    Lactic acid 2.4 (HH) 0.4 - 2.0 MMOL/L   RENAL FUNCTION PANEL    Collection Time: 10/14/22 10:03 PM   Result Value Ref Range    Sodium 137 136 - 145 mmol/L    Potassium 4.2 3.5 - 5.5 mmol/L    Chloride 106 100 - 111 mmol/L    CO2 24 21 - 32 mmol/L    Anion gap 7 3.0 - 18 mmol/L    Glucose 155 (H) 74 - 99 mg/dL    BUN 25 (H) 7.0 - 18 MG/DL    Creatinine 1.46 (H) 0.6 - 1.3 MG/DL    BUN/Creatinine ratio 17 12 - 20      eGFR 35 (L) >60 ml/min/1.73m2    Calcium 8.3 (L) 8.5 - 10.1 MG/DL    Phosphorus 2.7 2.5 - 4.9 MG/DL    Albumin 2.9 (L) 3.4 - 5.0 g/dL   MAGNESIUM    Collection Time: 10/14/22 10:03 PM   Result Value Ref Range    Magnesium 2.4 1.6 - 2.6 mg/dL   CALCIUM, IONIZED    Collection Time: 10/14/22 10:03 PM   Result Value Ref Range    Ionized Calcium 1.17 1.15 - 1.33 MMOL/L   CULTURE, BLOOD    Collection Time: 10/14/22 10:26 PM    Specimen: Blood   Result Value Ref Range    Special Requests: NO SPECIAL REQUESTS      Culture result: NO GROWTH AFTER 2 HOURS     CULTURE, BLOOD    Collection Time: 10/14/22 10:26 PM    Specimen: Blood   Result Value Ref Range    Special Requests: NO SPECIAL REQUESTS      Culture result: NO GROWTH AFTER 2 HOURS     GLUCOSE, POC    Collection Time: 10/15/22 12:12 AM   Result Value Ref Range    Glucose (POC) 159 (H) 70 - 110 mg/dL   CBC WITH AUTOMATED DIFF    Collection Time: 10/15/22  2:04 AM   Result Value Ref Range    WBC 13.7 (H) 4.6 - 13.2 K/uL    RBC 2.26 (L) 4.20 - 5.30 M/uL    HGB 7.3 (L) 12.0 - 16.0 g/dL    HCT 23.5 (L) 35.0 - 45.0 %    .0 (H) 78.0 - 100.0 FL    MCH 32.3 24.0 - 34.0 PG    MCHC 31.1 31.0 - 37.0 g/dL    RDW 21.5 (H) 11.6 - 14.5 %    PLATELET 228 (L) 108 - 420 K/uL    MPV 10.7 9.2 - 11.8 FL    NRBC 0.1 (H) 0  WBC    ABSOLUTE NRBC 0.02 (H) 0.00 - 0.01 K/uL    NEUTROPHILS 90 (H) 40 - 73 %    LYMPHOCYTES 4 (L) 21 - 52 %    MONOCYTES 5 3 - 10 %    EOSINOPHILS 0 0 - 5 %    BASOPHILS 0 0 - 2 %    IMMATURE GRANULOCYTES 1 (H) 0.0 - 0.5 %    ABS. NEUTROPHILS 12.3 (H) 1.8 - 8.0 K/UL    ABS. LYMPHOCYTES 0.6 (L) 0.9 - 3.6 K/UL    ABS. MONOCYTES 0.7 0.05 - 1.2 K/UL    ABS. EOSINOPHILS 0.0 0.0 - 0.4 K/UL    ABS. BASOPHILS 0.0 0.0 - 0.1 K/UL    ABS. IMM.  GRANS. 0.1 (H) 0.00 - 0.04 K/UL    DF AUTOMATED     LACTIC ACID    Collection Time: 10/15/22  2:04 AM   Result Value Ref Range    Lactic acid 3.0 (HH) 0.4 - 2.0 MMOL/L   RENAL FUNCTION PANEL    Collection Time: 10/15/22  2:04 AM   Result Value Ref Range    Sodium 134 (L) 136 - 145 mmol/L    Potassium 4.4 3.5 - 5.5 mmol/L    Chloride 104 100 - 111 mmol/L    CO2 24 21 - 32 mmol/L    Anion gap 6 3.0 - 18 mmol/L    Glucose 171 (H) 74 - 99 mg/dL    BUN 21 (H) 7.0 - 18 MG/DL    Creatinine 1.47 (H) 0.6 - 1.3 MG/DL    BUN/Creatinine ratio 14 12 - 20      eGFR 35 (L) >60 ml/min/1.73m2    Calcium 8.2 (L) 8.5 - 10.1 MG/DL    Phosphorus 2.6 2.5 - 4.9 MG/DL    Albumin 2.9 (L) 3.4 - 5.0 g/dL   MAGNESIUM    Collection Time: 10/15/22  2:04 AM   Result Value Ref Range    Magnesium 2.3 1.6 - 2.6 mg/dL   CALCIUM, IONIZED    Collection Time: 10/15/22  2:04 AM   Result Value Ref Range    Ionized Calcium 1.21 1.15 - 1.33 MMOL/L   PTT    Collection Time: 10/15/22  2:04 AM   Result Value Ref Range    aPTT 38.0 (H) 23.0 - 36.4 SEC   VANCOMYCIN, RANDOM    Collection Time: 10/15/22  2:04 AM   Result Value Ref Range    Vancomycin, random 21.1 5.0 - 40.0 UG/ML   PROCALCITONIN    Collection Time: 10/15/22  2:04 AM   Result Value Ref Range    Procalcitonin 18.63 ng/mL   HEP B SURFACE AB    Collection Time: 10/15/22  2:04 AM   Result Value Ref Range    Hepatitis B surface Ab <3.10 (L) >10.0 mIU/mL    Hep B surface Ab Interp. Negative (A) POS      Hep B surface Ab comment        Samples with a  value of 10 mIU/mL or greater are considered positive (protective immunity) in accordance with the CDC guidelines.    BLOOD GAS, ARTERIAL POC    Collection Time: 10/15/22  5:27 AM   Result Value Ref Range    Device: ADULT VENT      FIO2 (POC) 80 %    pH (POC) 7.25 (L) 7.35 - 7.45      pCO2 (POC) 53.7 (H) 35.0 - 45.0 MMHG    pO2 (POC) 326 (H) 80 - 100 MMHG    HCO3 (POC) 23.6 22 - 26 MMOL/L    sO2 (POC) 99.9 (H) 92 - 97 %    Base deficit (POC) 4.0 mmol/L    Mode PRESSURE CONTROL      Set Rate 24 bpm    PEEP/CPAP (POC) 14 cmH2O    PIP (POC) 18      Allens test (POC) Positive      Site RIGHT RADIAL      Specimen type (POC) ARTERIAL      Performed by Sheri Luque    LACTIC ACID    Collection Time: 10/15/22  6:05 AM   Result Value Ref Range    Lactic acid 2.7 (HH) 0.4 - 2.0 MMOL/L   MAGNESIUM    Collection Time: 10/15/22  6:05 AM   Result Value Ref Range    Magnesium 2.5 1.6 - 2.6 mg/dL   RENAL FUNCTION PANEL    Collection Time: 10/15/22  6:05 AM   Result Value Ref Range    Sodium 137 136 - 145 mmol/L    Potassium 4.7 3.5 - 5.5 mmol/L    Chloride 105 100 - 111 mmol/L    CO2 24 21 - 32 mmol/L    Anion gap 8 3.0 - 18 mmol/L    Glucose 196 (H) 74 - 99 mg/dL    BUN 21 (H) 7.0 - 18 MG/DL    Creatinine 1.27 0.6 - 1.3 MG/DL    BUN/Creatinine ratio 17 12 - 20      eGFR 42 (L) >60 ml/min/1.73m2    Calcium 8.1 (L) 8.5 - 10.1 MG/DL    Phosphorus 2.4 (L) 2.5 - 4.9 MG/DL    Albumin 2.9 (L) 3.4 - 5.0 g/dL   GLUCOSE, POC    Collection Time: 10/15/22  6:09 AM   Result Value Ref Range    Glucose (POC) 186 (H) 70 - 110 mg/dL           Recent Labs     10/15/22  0527 10/14/22  1314 10/14/22  0505   FIO2I 80 50 80   HCO3I 23.6 23.3 19.9*   PCO2I 53.7* 53.3* 42.1   PHI 7.25* 7.25* 7.28*   PO2I 326* 91 209*     Telemetry:AFIB    Imaging:  I have personally reviewed the patients radiographs and have reviewed the reports:    XR Results (most recent):  Results from Hospital Encounter encounter on 10/10/22    XR ABD (KUB)    Narrative  EXAM: XR ABD (KUB)    INDICATION: temp HD catheter placement - question of placement    COMPARISON: Radiograph 10/12/2022. FINDINGS: Supine view of the abdomen was obtained    Right femoral double-lumen catheter with tip projecting over the spine and right  to midline at L2 level, expected location of IVC. Nonobstructive bowel gas pattern. Decreased gaseous distention of small bowel  loops. No gross free intraperitoneal air on this limited exam. Presumed urinary  catheter overlies the lower pelvis. Diffuse osteopenia and prior vertebral  augmentation in the thoracolumbar spine. Impression  Right femoral dialysis catheter tip overlies the IVC at L2 level.     CT Results (most recent):  Results from Hospital Encounter encounter on 10/10/22    CT CHEST ABD PELV WO CONT    Narrative  CT CHEST ABDOMEN PELVIS UNENHANCED    CPT code: 75512, 50575 & 37419    INDICATION: Sepsis. TECHNIQUE: 5 mm collimation axial images obtained from the thoracic inlet to the  level of the pubic symphysis without intravenous or oral contrast..    All CT scans at this facility are performed using dose optimization technique as  appropriate to this specific exam, to include automated exposure control,  adjustment of the mA and/or KP according to patient size or use of iterative  reconstruction techniques. COMPARISON: Chest x-ray earlier same day    CHEST FINDINGS:  Lack of intravenous contrast renders this study suboptimal for evaluating  mediastinal structures. Endotracheal tube in place, tip terminating in the mid thoracic trachea. Nasogastric tube in place, tip terminating in the lateral body of the  nondistended stomach. Right jugular central line in place, tip to the distal SVC. Heart size top normal. There is a tiny pericardial effusion diffusely versus  smooth pericardial thickening. Diffuse bilateral coronary artery calcifications, left greater than right. No enlarged axillary lymph nodes. Borderline enlarged left anterior mediastinal  lymph nodes and precarinal nodes. Lung windows demonstrate no pneumothorax. Geographic areas of subsegmental  groundglass attenuation type changes present in the bilateral upper lobes and  more extensively in the lingular segment. More extensive groundglass to partially confluent airspace opacities involving  all segments of the bilateral lower lobes with central air bronchograms. Findings suggest acute infectious or inflammatory process. Trace left and small right pleural effusions. .    ABDOMEN FINDINGS:  Lack of intravenous contrast renders this study suboptimal for evaluating the  vasculature and solid abdominal organs. Liver is nonenlarged.  Several scattered calcification/granuloma in the right  lobe. Possible 8mm hypodensity in the dome of the right lobe, largely obscured  by streak artifact from spine. Gallbladder is borderline distended measuring up to 4.7 cm diameter. No  intraluminal stones are identified. Possible mild amount of adjacent stranding  in the pericholecystic fat. Pancreas severely motion degraded and not well assessed at all. Spleen nonenlarged. Mild thickening of the body of the left adrenal gland up to 12 mm. Right  unremarkable. Kidneys appear globally at least mildly atrophic. No hydronephrosis. Nonobstructing 4 mm lateral inferior right parenchymal calcification.  -Cannot exclude 1.8 cm low-attenuation lesion anterior mid left renal cortex on  image 21 series 3. There appears to be some adjacent scarring. Could simply be  normal parenchyma but mass not excluded given attenuation is not cystic. Abdominal aorta nonaneurysmal. Diffuse atherosclerotic wall calcification, to  involve the origin of the visceral vessels and renal arteries. Right femoral central line in place, extending to the infrarenal IVC. PELVIS FINDINGS:  Valverde catheter in place decompressing the urinary bladder. No small bowel distention to suggest obstruction. Mildly distended descending colon with mixture of fecal material and fluid. Sigmoid diverticulosis. No definite acute inflammatory change. Cannot well  assess for colitis or ischemia or similar due to lack of contrast and  distention. Uterus not enlarged. Mild diffuse mesenteric edema. No overt ascites. Diffuse subcutaneous edema. Treated compression deformities of L2 and T8-T12. Mild superior endplate  compression deformity L1 and minimally L4 age indeterminate. At least mild  compression deformity T4 age indeterminate. Impression  1. Bilateral lower lobe multi segmental groundglass to partially consolidative  airspace opacities. Favor acute infectious or inflammatory process.  Not densely  consolidative such as would be expected for aspiration. 2. Patchy groundglass airspace opacities bilateral upper lobes could reflect  component of edema. Trace left and small right effusions. 3. Very small diffuse pericardial effusion versus smooth pleural thickening. 4. Bilateral renal atrophy. No hydronephrosis. Nonobstructing small right renal  calcification.  -Suspect lobulation anterior left kidney versus less likely mass. Would consider  nonemergent dedicated renal ultrasound when clinically able for clarification. 5. Borderline gallbladder dilation without cholelithiasis. Minimal regional  mesenteric stranding. Unclear if this could be related to inflammation from  acalculus cholecystitis or could be related to the underlying body wall edema. Could correlate with ultrasound. 6. Sigmoid diverticulosis without any convincing evidence for acute  inflammation. No small bowel distention. 7. Mesenteric edema. Diffuse subcutaneous edema. 8. Multiple lumbar and thoracic compression deformities, several interval  treated with vertebroplasty. Several are not and are age indeterminant.                  [address], [place]  10/15/22

## 2022-10-15 NOTE — PROGRESS NOTES
10/14/22:  1930: Assumed care of patient after report. Drips verified. Orders reviewed. CRRT on hold waiting for filter change by dialysis nurse - he is currently with another patient on dialysis at this time. Dr. Elliott Harry updated by Danford Burkitt RN on vitals and CRRT status. 2100: Roseanne GEORGES updated on temperature of 100.5 - blood cultures not obtained earlier and reordered - phlebotomy called for peripheral sticks. 2153: CRRT filter changed and CRRT restarted - opening TMP 71.    2226: Blood cultures X2 drawn by phlebotomy. 10/15/22:  0805: Bedside and Verbal shift change report given to Georges Pierson RN (oncoming nurse) by Usha Guaman RN (offgoing nurse). Report included the following information SBAR, Intake/Output, MAR, Recent Results, and Cardiac Rhythm A fib with PVCs .

## 2022-10-15 NOTE — PROGRESS NOTES
Request for CRRT cartridge change, , primary RN LARA Hilton at bedside. CRRT /    Orders   Mode: CVVH   Blood Flow Rate: 200   Prismasol Dose:    Prismasol Concentrate:    Blood Warmer Temp: 37   Net Fluid Removal:      Metrics   BP:    HR:    Access Pressure: -53   Filter Pressure: 118   Return Pressure: 90   TMP: 55   Pressure Drop: 37     Access   Type & Location: Right side femoral CVC   Comments:                                        Labs   HBsAg (Antigen) / date:  Negative 10/15/22                                             HBsAb (Antibody) / date: Suspetible 10/15/22   Source: EMR     Safety:   Time Out Done:   (Time) 5200   Consent obtained/signed: yes   Education: yes   Primary Nurse Rpt Pre: LARA Hilton RN   Primary Nurse Rpt Post: LARA Hilton RN     Comments / Plan:      CRRT cartridge changed d/t high TMP pressure. Provide additional CRRT fluid 5 boxes.   Treatment resumed w/o complications

## 2022-10-15 NOTE — PROCEDURES
[employer] [facility]  Arterial  Line Procedure Note    Indication: Increasing pressor requirements with respiratory failure, shock, and need for frequent ABGs    Risks and benefits explained, informed consent obtained with the patient's daughter. Line Bundle:   Full sterile barrier precautions used. 7-Step Sterility Protocol followed. (cap, mask sterile gown, sterile gloves, large sterile sheet, hand hygiene, 2% chlorhexidine for cutaneous antisepsis)    Left radial artery cannulated x 2 attempt(s) utilizing the modified Seldinger technique. Good blood return. Catheter secured & Biopatch applied. Sterile Tegaderm placed.       DESTINI Nascimento  10/15/22  Pulmonary, Critical Care Medicine  [facility]

## 2022-10-15 NOTE — CONSULTS
Cardiology Consult Note    Consultation request by Arben Stephenson DO for advice/opinion related to evaluating atrial fibrillation with rapid ventricular rate and chronic systolic heart failure. Date of  Admission: 10/10/2022  5:31 PM   Primary Care Physician:  Raj Brown MD       Assessment:     --Cardiogenic shock due to severe biventricular heart failure. This appears acute on chronic over the past 12 months. Severely depressed LVEF 20% or less this admission. Severe RV dysfunction as well. --Ischemic cardiomyopathy. EF 30 to 35% 1 year ago in [facility] which has become worse now possibly due to progressive CAD. EF 20% at most currently. She is not a candidate for revascularization therapies or advanced heart failure therapies. --Coronary artery disease. History of multiple PCI/stenting procedures in [facility], last in June 2021 with MIRELLA to her left circumflex. She has multiple prior stents to her RCA and left circumflex as well. Her LAD has known moderate heavily calcified disease. Troponins do not suggest an acute MI as the cause for her decline. --Longstanding persistent atrial fibrillation. Rapid rates likely exacerbated by worsening cardiogenic shock. Not a candidate for anticoagulation. --Cardiorenal syndrome secondary to above  --Possible sepsis. No clear source identified. --Worsening anemia. Plan:     Stopping IV amiodarone due to chronicity of the atrial fibrillation  Loading with IV digoxin for better rate control  Trial of IV milrinone to try and improve cardiac output  Full supportive care for now per family wishes  Very poor short-term prognosis. History of Present Illness: This is a 78 y.o. female admitted for Pneumonia [J18.9]. Patient presented to the emergency room 5 days ago complaining of worsening shortness of breath.   She was treated for presumably pneumonia but then continued to deteriorate eventually requiring mechanical intubation on hospital day #2. She has remained intubated with increasing need for ventilatory support. She also has become progressively more hypotensive requiring increasing doses of vasopressors. She has a past medical history significant for longstanding persistent atrial fibrillation, coronary artery disease with prior stenting to her left circumflex vessels and right coronary artery vessels in [place], [place]. Her last PCI was to in-stent restenosis of an obtuse marginal branch in June 2021. She also has a history of an ischemic cardiomyopathy with progressing decline in her LV function over the past year. Last ejection fraction measured at 20 to 25% several months ago. 1 year prior her ejection fraction was 30 to 35% which was a decline from 45 to 50% 18 months ago. She was hospitalized at an outside facility 3 months ago for acute on chronic stage IV heart failure. I reviewed her echocardiogram from this admission and the patient has severe biventricular dysfunction. LVEF 20% at most with her RV function being probably even less than this. Cardiology consultation sought today for increased ventricular rates with her atrial fibrillation, worsening hypotension and decreasing urine output. Patient is intubated and sedated, so history obtained through chart review. Cardiac risk factors: Known CAD, ischemic cardiomyopathy and longstanding persistent atrial fibrillation      Review of Symptoms:  Except as stated above include:  Constitutional:  negative  Respiratory:  negative  Cardiovascular:  negative  Gastrointestinal: negative  Genitourinary:  negative  Musculoskeletal:  Negative  Neurological:  Negative  Dermatological:  Negative  Endocrinological: Negative  Psychological:  Negative    Review of systems not obtained due to patient factors.      Past Medical History:     Past Medical History:   Diagnosis Date    A-fib [facility])     CAD (coronary artery disease)     Heart Failure Chronic systolic CHF (congestive heart failure) (HCC)     LVEF 20-25% & Abnormal Diastolic Function by TTE on 6/03/2022    Hypertension     MI (myocardial infarction) ([facility] [address]     Osteoporosis     Stroke [facility])          Social History:     Social History     Socioeconomic History    Marital status:    Tobacco Use    Smoking status: Former    Smokeless tobacco: Never   Substance and Sexual Activity    Alcohol use: No    Drug use: No        Family History:   No family history on file.      Medications:   No Known Allergies     Current Facility-Administered Medications   Medication Dose Route Frequency    [Held by provider] heparin (porcine) injection 5,000 Units  5,000 Units SubCUTAneous Q8H    vasopressin (VASOSTRICT) 20 Units in 0.9% sodium chloride 100 mL infusion  0.03 Units/min IntraVENous CONTINUOUS    anidulafungin (ERAXIS) 200 mg in 0.9% sodium chloride 260 mL IVPB  200 mg IntraVENous ONCE    [START ON 10/16/2022] anidulafungin (ERAXIS) 100 mg in 0.9% sodium chloride 130 mL IVPB  100 mg IntraVENous Q24H    [START ON 10/16/2022] meropenem (MERREM) 2 g in 0.9% sodium chloride 100 mL IVPB  2 g IntraVENous Q12H    polyvinyl alcohol-povidon(PF) (REFRESH CLASSIC) 1.4-0.6 % ophthalmic solution 1 Drop  1 Drop Both Eyes PRN    hydrocortisone Sod Succ (PF) (SOLU-CORTEF) injection 50 mg  50 mg IntraVENous Q6H    milrinone (PRIMACOR) 20 MG/100 ML D5W infusion  0.375 mcg/kg/min IntraVENous CONTINUOUS    digoxin (LANOXIN) injection 250 mcg  250 mcg IntraVENous Q6H    VANCOMYCIN INFORMATION NOTE   Other Rx Dosing/Monitoring    insulin glargine (LANTUS) injection 6 Units  6 Units SubCUTAneous DAILY    pantoprazole (PROTONIX) 40 mg in 0.9% sodium chloride 10 mL injection  40 mg IntraVENous DAILY    bicarbonate dialysis (PRISMASOL) BG K 4/Ca 2.5 5000 ml solution   Extracorporeal DIALYSIS CONTINUOUS    bicarbonate dialysis (PRISMASOL) BG K 4/Ca 2.5 5000 ml solution   Extracorporeal DIALYSIS CONTINUOUS    chlorhexidine (PERIDEX) 0.12 % mouthwash 10 mL  10 mL Oral Q12H    sodium chloride (NS) flush 5-40 mL  5-40 mL IntraVENous PRN    fentaNYL (PF) 1,500 mcg/30 mL (50 mcg/mL) infusion  0-150 mcg/hr IntraVENous TITRATE    insulin lispro (HUMALOG) injection   SubCUTAneous Q6H    glucose chewable tablet 16 g  4 Tablet Oral PRN    glucagon (GLUCAGEN) injection 1 mg  1 mg IntraMUSCular PRN    dextrose 10% infusion 0-250 mL  0-250 mL IntraVENous PRN    dexmedeTOMidine in 0.9 % NaCl (PRECEDEX) 400 mcg/100 mL (4 mcg/mL) infusion soln  0.1-1.5 mcg/kg/hr IntraVENous TITRATE    fentaNYL citrate (PF) injection 100 mcg  100 mcg IntraVENous Q2H PRN    midazolam (VERSED) injection 1 mg  1 mg IntraVENous Q2H PRN    NOREPINephrine (LEVOPHED) 32,000 mcg in dextrose 5% 250 mL (128 mcg/mL) infusion  0.5-30 mcg/min IntraVENous TITRATE    aspirin delayed-release tablet 81 mg  81 mg Oral DAILY    atorvastatin (LIPITOR) tablet 80 mg  80 mg Oral QHS    [Held by provider] clopidogreL (PLAVIX) tablet 75 mg  75 mg Oral DAILY    cyanocobalamin tablet 1,000 mcg  1,000 mcg Oral DAILY    [Held by provider] escitalopram oxalate (LEXAPRO) tablet 15 mg  15 mg Oral DAILY    ferrous sulfate tablet 325 mg  325 mg Oral ACB    memantine (NAMENDA) tablet 10 mg  10 mg Oral BID    [Held by provider] metoprolol tartrate (LOPRESSOR) tablet 25 mg  25 mg Oral TID    [Held by provider] lisinopriL (PRINIVIL, ZESTRIL) tablet 5 mg  5 mg Oral DAILY    albuterol-ipratropium (DUO-NEB) 2.5 MG-0.5 MG/3 ML  3 mL Nebulization Q4H RT    acetaminophen (TYLENOL) tablet 650 mg  650 mg Oral Q6H PRN    midodrine (PROAMATINE) tablet 10 mg  10 mg Oral TID    nitroglycerin (NITROBID) 2 % ointment 1 Inch  1 Inch Topical Q6H PRN    albuterol-ipratropium (DUO-NEB) 2.5 MG-0.5 MG/3 ML  3 mL Nebulization Q6H PRN     Facility-Administered Medications Ordered in Other Encounters   Medication Dose Route Frequency    etomidate (AMIDATE) 2 mg/mL injection   IntraVENous PRN    succinylcholine (ANECTINE) 20 mg/mL syringe   IntraVENous PRN         Physical Exam:   Visit Vitals  BP (!) 86/59   Pulse (!) 138   Temp (!) 95.8 °F (35.4 °C)   Resp 18   Ht 5' 3\" (1.6 m)   Wt 56.1 kg (123 lb 10.9 oz)   SpO2 (!) 86%   Breastfeeding No   BMI 21.91 kg/m²       TELE: AFIB    BP Readings from Last 3 Encounters:   10/15/22 (!) 86/59   09/29/22 118/80   07/21/22 92/62     Pulse Readings from Last 3 Encounters:   10/15/22 (!) 138   09/29/22 66   07/21/22 77     Wt Readings from Last 3 Encounters:   10/15/22 56.1 kg (123 lb 10.9 oz)   09/29/22 50.8 kg (111 lb 15.9 oz)   07/21/22 50.8 kg (112 lb 1.6 oz)       General: Intubated, sedated  Neck:  +JVD  Lungs:  rales diffuse bilaterally  Heart:  irregularly irregular rhythm, tachycardic, no appreciable murmur  Abdomen:  abdomen is soft without significant tenderness, masses, organomegaly or guarding  Extremities:  no edema, redness or tenderness in the calves or thighs  Skin: Warm and dry.  no hyperpigmentation, vitiligo, or suspicious lesions  Neuro: Unable to assess  Psych: Unable to assess     Data Review:     Recent Labs     10/15/22  0204 10/14/22  1805 10/14/22  1252   WBC 13.7* 11.6 14.9*   HGB 7.3* 7.7* 7.7*   HCT 23.5* 24.6* 25.1*   * 137 129*     Recent Labs     10/15/22  1310 10/15/22  1048 10/15/22  0605 10/15/22  0204   NA  --  135* 137 134*   K  --  4.7 4.7 4.4   CL  --  104 105 104   CO2  --  23 24 24   GLU  --  174* 196* 171*   BUN  --  20* 21* 21*   CREA  --  1.35* 1.27 1.47*   CA  --  8.1* 8.1* 8.2*   MG  --  2.4 2.5 2.3   PHOS  --  2.6 2.4* 2.6   ALB  --  2.8* 2.9* 2.9*   ALT  --  251*  --   --    INR 1.2  --   --   --        Results for orders placed or performed during the hospital encounter of 10/10/22   EKG, 12 LEAD, INITIAL   Result Value Ref Range    Ventricular Rate 112 BPM    Atrial Rate 133 BPM    QRS Duration 68 ms    Q-T Interval 388 ms    QTC Calculation (Bezet) 529 ms    Calculated R Axis -25 degrees    Calculated T Axis -102 degrees    Diagnosis Atrial fibrillation with rapid ventricular response  Low voltage QRS  Cannot rule out Anterior infarct , age undetermined  Non-specific ST/T wave changes  Abnormal ECG  When compared with ECG of 29-SEP-2019 09:37,  No significant change was found  Confirmed by Danielle Gomez ([id number]) on 10/12/2022 7:48:48 AM         All Cardiac Markers in the last 24 hours:  No results found for: CPK, CK, CKMMB, CKMB, RCK3, CKMBT, CKNDX, CKND1, DIALLO, TROPT, TROIQ, VIJAY, TROPT, TNIPOC, BNP, BNPP    Last Lipid:    Lab Results   Component Value Date/Time    Cholesterol, total 125 07/19/2022 06:09 AM    HDL Cholesterol 33 (L) 07/19/2022 06:09 AM    LDL,Direct 88 07/19/2022 06:09 AM    Triglyceride 82 07/19/2022 06:09 AM    CHOL/HDL Ratio 3.8 07/19/2022 06:09 AM       Cardiographics:     EKG Results       Procedure 720 Value Units Date/Time    EKG, 12 LEAD, INITIAL [[id number]] Collected: 10/10/22 1737    Order Status: Completed Updated: 10/12/22 0749     Ventricular Rate 112 BPM      Atrial Rate 133 BPM      QRS Duration 68 ms      Q-T Interval 388 ms      QTC Calculation (Bezet) 529 ms      Calculated R Axis -25 degrees      Calculated T Axis -102 degrees      Diagnosis --     Atrial fibrillation with rapid ventricular response  Low voltage QRS  Cannot rule out Anterior infarct , age undetermined  Non-specific ST/T wave changes  Abnormal ECG  When compared with ECG of 29-SEP-2019 09:37,  No significant change was found  Confirmed by Danielle Gomez ([id number]) on 10/12/2022 7:48:48 AM            10/10/22    ECHO ADULT FOLLOW-UP OR LIMITED 10/12/2022 10/12/2022    Interpretation Summary    Left Ventricle: Severely reduced left ventricular systolic function with a visually estimated EF of 20 - 25%. Left ventricle size is normal. Mildly increased wall thickness. Severe global hypokinesis present. Right Ventricle: Reduced systolic function. Aortic Valve: Mild regurgitation. Mitral Valve: Mild regurgitation.     Left Atrium: Left atrium is dilated. Right Atrium: Right atrium is severely dilated. Pulmonary Arteries: Moderate pulmonary hypertension present. The estimated PASP is 53 mmHg. Signed by: Rafaela Garduno MD on 10/12/2022  4:16 PM            XR Results (most recent):  Results from [place] encounter on 10/10/22    XR ABD (KUB)    Narrative  EXAM: XR ABD (KUB)    INDICATION: temp HD catheter placement - question of placement    COMPARISON: Radiograph 10/12/2022. FINDINGS: Supine view of the abdomen was obtained    Right femoral double-lumen catheter with tip projecting over the spine and right  to midline at L2 level, expected location of IVC. Nonobstructive bowel gas pattern. Decreased gaseous distention of small bowel  loops. No gross free intraperitoneal air on this limited exam. Presumed urinary  catheter overlies the lower pelvis. Diffuse osteopenia and prior vertebral  augmentation in the thoracolumbar spine. Impression  Right femoral dialysis catheter tip overlies the IVC at L2 level.         Signed By: Rafaela Garduno MD     October 15, 2022

## 2022-10-15 NOTE — PROGRESS NOTES
[address] [facility] Pharmacokinetic Monitoring Service - Vancomycin    Consulting Provider: Wong Bloom MD  Indication: Pneumonia (CAP)  Target Concentration:  Dosing based on anticipated concentration <15 mg/L. CVVH  Day of Therapy:  5 of 7   Additional Antimicrobials: Piperacillin/Tazobactam    Pertinent Laboratory Values: Wt Readings from Last 1 Encounters:   10/12/22 52 kg (114 lb 10.2 oz)     Temp Readings from Last 1 Encounters:   10/13/22 98.7 °F (37.1 °C)     Recent Labs     10/13/22  0409 10/12/22  2350 10/12/22  0954 10/12/22  0550   CREA 1.36* 1.48* 1.47* 1.27   BUN 36* 36* 31* 27*     No components found for: PROCAL  Estimated Creatinine Clearance: 25.3 mL/min (A) (based on SCr of 1.36 mg/dL (H)).   Recent Labs     10/13/22  0409 10/12/22  1329   WBC 17.6* 15.5*       Pertinent Cultures:  Culture Date Source Results   10/10 blood NO GROWTH 4 DAYS   10/10 urine No significant growth, <10,000 CFU/mL   10/12 Sputum Pending   10/14 Blood Pending   MRSA Nasal Swab: MRSA NOT PRESENT       Assessment:  Date/Time Current Dose Concentration Timing of Concentration (h) AUC   10/11 2152 1000 mg x1     -   10/12 0550 - 7.4 8 N/A   10/12 1211 750 mg x 1         10/13 0409 - 12.1 16     10/13 0613 750 mg IV x 1      10/14 0530 750 mg x 1      10/14 1252 - 19     10/14 1800 500 mg x 1      10/15 0204 - 21.1 8    Note: Serum concentrations collected for AUC dosing may appear elevated if collected in close proximity to the dose administered, this is not necessarily an indication of toxicity    Plan:  Vancomycin dose not needed now  Repeat vancomycin concentration ordered for 10/16 @ 0400   Pharmacy will continue to monitor patient and adjust therapy as indicated    Thank you for the consult,  ADRI Dugan  10/13/2022 5:21 AM

## 2022-10-15 NOTE — CONSULTS
Consulted received. Chart reviewed. Clinical presentation consistent with septic shock-  Etiology unclear. Differential diagnosis includes aspiration pneumonia/undiagnosed abdominal pathology-? Cholecystitis versus ischemic colitis. Rule out fungemia  Management complicated due to inability to do CT imaging with contrast due to patient's clinical instability, need for CRRT    Recommendations:  -Discontinue Zosyn. Start meropenem. Unable to use quinolones since QTC greater than 500 and patient is on amiodarone.  -Continue vancomycin for now  -start eraxis. Follow up fungitell  -Follow-up blood culture, sputum culture and modify antibiotics accordingly  -Follow-up results of ultrasound abdomen  -Titrate pressors as tolerated  -Wean oxygen as tolerated  -Follow-up nephrology recommendations  -Monitor CBC, LFTs, renal function, lactic acid  -Consider goals of care discussion with the family since prognosis guarded  -Will need to make further decisions about imaging studies based on above test results, clinical course    D/w dr. Verner Leaver. Full consult to follow. Please call me if any new questions or concerns.  thanks

## 2022-10-16 NOTE — PROGRESS NOTES
2030  Assumed pt care, [facility], BFR at 200, Replacement at 1000ml/hr and Pre blood pump at 500ml/hr, no dialysate, pt on CVVH, 4/2.5 bags hanging on scale, pt to be at net 0.    2205  Q4H labs drawn from CRRT arterial line and given to  to take down. 2243  Noted orders for TF new rate at 20ml/hr, rate reduced to 20ml/hr from 45.   2245  Notified ICU PA for lactic acid, io ca, and phos level. 0210  Q4H labs drawn from A-line and sent to lab.   0335  Hung 1 unit of PRBC's over 1 hour. 5308  Blood transfusion completed, pt tolerated well, no transfusion reaction suspected. 3485  Q4H labs drawn from A-line and sent to lab.   0732  Bedside shift change report given to Jessee Rodríguez RN (oncoming nurse) by LARA Arevalo RN (offgoing nurse). Report included the following information SBAR, Intake/Output, MAR, Recent Results, Med Rec Status, and Cardiac Rhythm A-fib.

## 2022-10-16 NOTE — PROGRESS NOTES
[facility]. Pulmonary, Critical Care, and Sleep Medicine    Name: Tabby Fitzgerald MRN: [id number]   : 1940 Hospital: [address] [place] [address] Dr   Date: 10/16/2022  Admission Date: 10/10/2022     Chart and notes reviewed. Data reviewed. I have evaluated all findings. [x]I have reviewed the flowsheet and previous days notes. [x]The patient is unable to give any meaningful history or review of systems because the patient is:  [x]Intubated []Sedated   []Unresponsive      [x]The patient is critically ill on      [x]Mechanical ventilation [x]Pressors   []BiPAP []         Interval HPI:    Patient is a 78 y.o. female with PMH MI, CVA, systolic and diastolic CHF, COPD, A-fib, CAD admitted to [facility] on 10/10/22 via EMS c/o SOB and pedal edema. She was found to be hypoxic in CHF exacerbation and dx of CAP, started on ABX and admitted. She unfortunately was getting half of her home dose of lasix orally despite her CHF exacerbation with BNP greater than 20k. Per chart review, on 10/11 rapid response was called for acute respiratory distress with O2 sats 70s to low 80s, on NC and NRB. With no improvement in oxygenation she was switched to HFNC then BIPAP. The morning of 10/12 she was found to be tachypneic with  respiratory alkalosis and worsening SpO2. She was intubated and transferred to ICU. CXR consistent with pulmonary edema. She was also in Afib RVR and was on a diltiazem drip. This was stopped upon arrival to the ICU as her rate was well controlled at that time and she was becoming hypotensive. Started on levophed and central line placed on 10/12. Afib rhythm has resumed with rates staying in the 120s-130s despite amiodarone drip.      CT obtained 10/14/22 demonstrates infectious v inflammatory process in bilateral lower lungs, possible acalculous cholecystitis v edema secondary to volume overload, sigmoid diverticulosis and mesenteric edema    10/16 - On CRRT net even, requiring pressors for HD stability. Subjective 10/16/22  Hospital Day: 10/11  Vent Day: 10/12  Overnight events: Calcium replaced, 1 unit of PRBCs transfused   Mentation/Activity: intubated, sedated  Respiratory/ Secretions: stable  Hemodynamics: stable on pressors, midodrine, milrinone  Urine output, bowel:   Diet:  Need for procedures:              ROS:Review of systems not obtained due to patient factors. Events and notes from last 24 hours reviewed.      Patient Active Problem List   Diagnosis Code    CHF (congestive heart failure) ([facility]) I50.9    Acute on chronic systolic and diastolic heart failure, NYHA class 4 ([facility]) I50.43    Fluid overload E87.70    Carotid artery disease ([facility]) I77.9    Pneumonia J18.9    UTI (urinary tract infection) N39.0       Vital Signs:  Visit Vitals  /63   Pulse (!) 119   Temp 97.7 °F (36.5 °C)   Resp 26   Ht 5' 3\" (1.6 m)   Wt 56.1 kg (123 lb 10.9 oz)   SpO2 96%   Breastfeeding No   BMI 21.91 kg/m²       O2 Device: Endotracheal tube, Ventilator   O2 Flow Rate (L/min): 50 l/min   Temp (24hrs), Av.2 °F (36.2 °C), Min:95.8 °F (35.4 °C), Max:98.6 °F (37 °C)       Intake/Output:   Last shift:      10/15 1901 - 10/16 0700  In: 1285.2 [I.V.:550.2]  Out: 1174   Last 3 shifts: 10/14 07 - 10/15 1900  In: 5330.8 [I.V.:2706.8]  Out: 4262 [Urine:45]    Intake/Output Summary (Last 24 hours) at 10/16/2022 0433  Last data filed at 10/16/2022 0400  Gross per 24 hour   Intake 3950.95 ml   Output 3131 ml   Net 819.95 ml          Current Facility-Administered Medications   Medication Dose Route Frequency    [Held by provider] heparin (porcine) injection 5,000 Units  5,000 Units SubCUTAneous Q8H    vasopressin (VASOSTRICT) 20 Units in 0.9% sodium chloride 100 mL infusion  0.03 Units/min IntraVENous CONTINUOUS    anidulafungin (ERAXIS) 100 mg in 0.9% sodium chloride 130 mL IVPB  100 mg IntraVENous Q24H    meropenem (MERREM) 2 g in 0.9% sodium chloride 100 mL IVPB  2 g IntraVENous Q12H    hydrocortisone Sod Succ (PF) (SOLU-CORTEF) injection 50 mg  50 mg IntraVENous Q6H    milrinone (PRIMACOR) 20 MG/100 ML D5W infusion  0.375 mcg/kg/min IntraVENous CONTINUOUS    digoxin (LANOXIN) injection 250 mcg  250 mcg IntraVENous Q6H    VANCOMYCIN INFORMATION NOTE   Other Rx Dosing/Monitoring    insulin glargine (LANTUS) injection 6 Units  6 Units SubCUTAneous DAILY    pantoprazole (PROTONIX) 40 mg in 0.9% sodium chloride 10 mL injection  40 mg IntraVENous DAILY    bicarbonate dialysis (PRISMASOL) BG K 4/Ca 2.5 5000 ml solution   Extracorporeal DIALYSIS CONTINUOUS    bicarbonate dialysis (PRISMASOL) BG K 4/Ca 2.5 5000 ml solution   Extracorporeal DIALYSIS CONTINUOUS    chlorhexidine (PERIDEX) 0.12 % mouthwash 10 mL  10 mL Oral Q12H    fentaNYL (PF) 1,500 mcg/30 mL (50 mcg/mL) infusion  0-150 mcg/hr IntraVENous TITRATE    insulin lispro (HUMALOG) injection   SubCUTAneous Q6H    dexmedeTOMidine in 0.9 % NaCl (PRECEDEX) 400 mcg/100 mL (4 mcg/mL) infusion soln  0.1-1.5 mcg/kg/hr IntraVENous TITRATE    NOREPINephrine (LEVOPHED) 32,000 mcg in dextrose 5% 250 mL (128 mcg/mL) infusion  0.5-30 mcg/min IntraVENous TITRATE    aspirin delayed-release tablet 81 mg  81 mg Oral DAILY    atorvastatin (LIPITOR) tablet 80 mg  80 mg Oral QHS    [Held by provider] clopidogreL (PLAVIX) tablet 75 mg  75 mg Oral DAILY    cyanocobalamin tablet 1,000 mcg  1,000 mcg Oral DAILY    [Held by provider] escitalopram oxalate (LEXAPRO) tablet 15 mg  15 mg Oral DAILY    ferrous sulfate tablet 325 mg  325 mg Oral ACB    memantine (NAMENDA) tablet 10 mg  10 mg Oral BID    [Held by provider] metoprolol tartrate (LOPRESSOR) tablet 25 mg  25 mg Oral TID    [Held by provider] lisinopriL (PRINIVIL, ZESTRIL) tablet 5 mg  5 mg Oral DAILY    albuterol-ipratropium (DUO-NEB) 2.5 MG-0.5 MG/3 ML  3 mL Nebulization Q4H RT    midodrine (PROAMATINE) tablet 10 mg  10 mg Oral TID         Telemetry: []Sinus []A-flutter []Paced    [x]A-fib []Multiple PVCs                  Physical Exam:      General: Intubated/sedated; HEENT:  Anicteric sclerae; pink palpebral conjunctivae; mucosa moist  Resp:  Symmetrical chest expansion, no accessory muscle use; rales yamil lung fields  CV: irregularly irregular   GI:  Abdomen soft, non-tender;  active bowel sounds  Extremities:  +2 pulses on all extremities; 2+ pitting edema LEs  Skin:  Warm; no rashes/ lesions noted, normal turgor/cap refill   Neurologic:  Non-focal  Devices:  OGT, Central line ETT      DATA:  MAR reviewed and pertinent medications noted or modified as needed    Labs:  Recent Labs     10/15/22  1815 10/15/22  0204 10/14/22  1805 10/14/22  1252   WBC  --  13.7* 11.6 14.9*   HGB 6.3* 7.3* 7.7* 7.7*   HCT 19.4* 23.5* 24.6* 25.1*   PLT  --  106* 137 129*     Recent Labs     10/16/22  0210 10/15/22  2205 10/15/22  1815 10/15/22  1310 10/15/22  1048    136 134*  --  135*   K 4.6 4.7 4.7  --  4.7    104 105  --  104   CO2 24 24 22  --  23   * 100* 96  --  174*   BUN 17 17 18  --  20*   CREA 1.14 1.31* 1.36*  --  1.35*   CA 8.1* 8.4* 7.8*  --  8.1*   MG 2.6 2.3 2.3  --  2.4   PHOS 1.6* 1.7* 1.9*  --  2.6   ALB 3.2* 3.3* 3.4  --  2.8*   ALT  --   --   --   --  251*   INR  --   --   --  1.2  --      No results for input(s): PH, PCO2, PO2, HCO3, FIO2 in the last 72 hours. Recent Labs     10/16/22  0307 10/15/22  1836 10/15/22  0527   FIO2I 50 50 80   HCO3I 23.0 20.0* 23.6   PCO2I 40.6 36.5 53.7*   PHI 7.36 7.35 7.25*   PO2I 82 146* 326*       Imaging:  [x]   I have personally reviewed the patients radiographs and reports  XR Results (most recent):  XR Results (most recent):  Results from Hospital Encounter encounter on 10/10/22    XR ABD (KUB)    Narrative  EXAM: XR ABD (KUB)    INDICATION: temp HD catheter placement - question of placement    COMPARISON: Radiograph 10/12/2022.     FINDINGS: Supine view of the abdomen was obtained    Right femoral double-lumen catheter with tip projecting over the spine and right  to midline at L2 level, expected location of IVC. Nonobstructive bowel gas pattern. Decreased gaseous distention of small bowel  loops. No gross free intraperitoneal air on this limited exam. Presumed urinary  catheter overlies the lower pelvis. Diffuse osteopenia and prior vertebral  augmentation in the thoracolumbar spine. Impression  Right femoral dialysis catheter tip overlies the IVC at L2 level. CT Results (most recent):  Results from [place] encounter on 10/10/22    CT CHEST ABD PELV WO CONT    Narrative  CT CHEST ABDOMEN PELVIS UNENHANCED    CPT code: 68719, 79619 & 65580    INDICATION: Sepsis. TECHNIQUE: 5 mm collimation axial images obtained from the thoracic inlet to the  level of the pubic symphysis without intravenous or oral contrast..    All CT scans at this facility are performed using dose optimization technique as  appropriate to this specific exam, to include automated exposure control,  adjustment of the mA and/or KP according to patient size or use of iterative  reconstruction techniques. COMPARISON: Chest x-ray earlier same day    CHEST FINDINGS:  Lack of intravenous contrast renders this study suboptimal for evaluating  mediastinal structures. Endotracheal tube in place, tip terminating in the mid thoracic trachea. Nasogastric tube in place, tip terminating in the lateral body of the  nondistended stomach. Right jugular central line in place, tip to the distal SVC. Heart size top normal. There is a tiny pericardial effusion diffusely versus  smooth pericardial thickening. Diffuse bilateral coronary artery calcifications, left greater than right. No enlarged axillary lymph nodes. Borderline enlarged left anterior mediastinal  lymph nodes and precarinal nodes. Lung windows demonstrate no pneumothorax. Geographic areas of subsegmental  groundglass attenuation type changes present in the bilateral upper lobes and  more extensively in the lingular segment.   More extensive groundglass to partially confluent airspace opacities involving  all segments of the bilateral lower lobes with central air bronchograms. Findings suggest acute infectious or inflammatory process. Trace left and small right pleural effusions. .    ABDOMEN FINDINGS:  Lack of intravenous contrast renders this study suboptimal for evaluating the  vasculature and solid abdominal organs. Liver is nonenlarged. Several scattered calcification/granuloma in the right  lobe. Possible 8mm hypodensity in the dome of the right lobe, largely obscured  by streak artifact from spine. Gallbladder is borderline distended measuring up to 4.7 cm diameter. No  intraluminal stones are identified. Possible mild amount of adjacent stranding  in the pericholecystic fat. Pancreas severely motion degraded and not well assessed at all. Spleen nonenlarged. Mild thickening of the body of the left adrenal gland up to 12 mm. Right  unremarkable. Kidneys appear globally at least mildly atrophic. No hydronephrosis. Nonobstructing 4 mm lateral inferior right parenchymal calcification.  -Cannot exclude 1.8 cm low-attenuation lesion anterior mid left renal cortex on  image 21 series 3. There appears to be some adjacent scarring. Could simply be  normal parenchyma but mass not excluded given attenuation is not cystic. Abdominal aorta nonaneurysmal. Diffuse atherosclerotic wall calcification, to  involve the origin of the visceral vessels and renal arteries. Right femoral central line in place, extending to the infrarenal IVC. PELVIS FINDINGS:  Valverde catheter in place decompressing the urinary bladder. No small bowel distention to suggest obstruction. Mildly distended descending colon with mixture of fecal material and fluid. Sigmoid diverticulosis. No definite acute inflammatory change. Cannot well  assess for colitis or ischemia or similar due to lack of contrast and  distention. Uterus not enlarged.   Mild diffuse mesenteric edema. No overt ascites. Diffuse subcutaneous edema. Treated compression deformities of L2 and T8-T12. Mild superior endplate  compression deformity L1 and minimally L4 age indeterminate. At least mild  compression deformity T4 age indeterminate. Impression  1. Bilateral lower lobe multi segmental groundglass to partially consolidative  airspace opacities. Favor acute infectious or inflammatory process. Not densely  consolidative such as would be expected for aspiration. 2. Patchy groundglass airspace opacities bilateral upper lobes could reflect  component of edema. Trace left and small right effusions. 3. Very small diffuse pericardial effusion versus smooth pleural thickening. 4. Bilateral renal atrophy. No hydronephrosis. Nonobstructing small right renal  calcification.  -Suspect lobulation anterior left kidney versus less likely mass. Would consider  nonemergent dedicated renal ultrasound when clinically able for clarification. 5. Borderline gallbladder dilation without cholelithiasis. Minimal regional  mesenteric stranding. Unclear if this could be related to inflammation from  acalculus cholecystitis or could be related to the underlying body wall edema. Could correlate with ultrasound. 6. Sigmoid diverticulosis without any convincing evidence for acute  inflammation. No small bowel distention. 7. Mesenteric edema. Diffuse subcutaneous edema. 8. Multiple lumbar and thoracic compression deformities, several interval  treated with vertebroplasty. Several are not and are age indeterminant. 10/10/22    ECHO ADULT FOLLOW-UP OR LIMITED 10/12/2022 10/12/2022    Interpretation Summary    Left Ventricle: Severely reduced left ventricular systolic function with a visually estimated EF of 20 - 25%. Left ventricle size is normal. Mildly increased wall thickness. Severe global hypokinesis present. Right Ventricle: Reduced systolic function. Aortic Valve: Mild regurgitation. Mitral Valve: Mild regurgitation. Left Atrium: Left atrium is dilated. Right Atrium: Right atrium is severely dilated. Pulmonary Arteries: Moderate pulmonary hypertension present. The estimated PASP is 53 mmHg. Signed by: Giuliana Gutierrez MD on 10/12/2022  4:16 PM       IMPRESSION:   Acute hypoxic respiratory failure requiring mechanical ventilation  CHF exacerbation - combined systolic and diastolic dysfunction, last EF  6/2022 was 20-25%  Severe hypervolemia requiring CRRT  Acute pulmonary edema  SIRS:  no current source of sepsis  Leukocytosis   Lactic acidosis  COPD  CAD - stents placed 6/2021  A-fib with RVR  Shock:  Cardiogenic secondary to medications including CCB and BB  Macrocytic anemia   Hyperlipidemia  UTI  H/o MI  H/o CVA - aphasia  H/o GERD  H/o anxiety     Patient Active Problem List   Diagnosis Code    CHF (congestive heart failure) ([facility]) I50.9    Acute on chronic systolic and diastolic heart failure, NYHA class 4 ([facility]) I50.43    Fluid overload E87.70    Carotid artery disease ([facility]) I77.9    Pneumonia J18.9    UTI (urinary tract infection) N39.0        RECOMMENDATIONS:   Neuro: Titrate sedation to RASS of 0 to -1. PRN for breakthrough sedation needs. H/o aphasia following CVA. Pulm: Titrate FiO2 for goal SPO2> 90%,VAP prevention bundle. Daily sedation holiday and assessment for weaning with SBT as tolerated. PRN duonebs. Aspiration precautions, Keep HOB >30 degrees. Pulmonary edema 2/2 hypervolemia. Oxygenation improving with CRRT. CVS : Continuous cardiac monitoring, titrate levophed to maintain MAP >65mmHg. Echo 10/12 showed EF 20-25% (similar to 6/22). Heparin gtt for A-fib RVR. CRRT initiated with improvement in oxygen requirements. GI: tube feeds     Renal: Strict I/Os. ELY vs ATN, ?cardiorenal syndrome. CRRT with with net even. Hem/Onc: Monitor for s/o active bleeding.  Hold heparin, decreased Hgb requring transfusion of 1 unit of PRBCs, plts stable but trending down 106. Monitor H&H. I/D: On vanc and zosy, persistent leukocytosis. Normothermic now. Repeat blood cx 10/14, CT 10/14. Endocrine: Q6 glucoses, SSI. Metabolic:  Daily BMP, mag, phos. Trend lytes, replace as needed. Calcium replaced. Musc/Skin: no acute issues, wound care     Full Code  Discussed during interdisciplinary rounds. Best practice :    Glycemic control  IHI ICU bundles:   Central Line Bundle Followed , Villeda Bundle Followed, and Vent Bundle Followed, Vent Day 10/12     [facility] Vent patients-    VAP bundle-[place] tube to suction at 20-30 cm Hg, Maintain Chika tube with 5-10ml air every 4 hours, Routine oral care every 4 hours, Elevation of head > 45 degree, Daily sedation holiday and SBT evaluation starting at 6.00am. and [facility] network Guidelines: Lung protective strategy and Plateau  Pressure goal < 30 cm H2O goals, Oxygenation Goals PaO2 55-80 mm Hg or SaO2 88-95%, PH goal 7.30-7.45  Stress ulcer prophylaxis. Protonix  DVT prophylaxis. Not indicated anemia  Need for Lines, villeda assessed. Palliative care evaluation. Restraints not need. 15 minutes were spent with the patient at the bedside. This care involved high complexity decision making to assess, manipulate, and support vital system functions, to treat this degreee vital organ system failure and to prevent further life threatening deterioration of the patients condition  The services I provided to this patient were to treat and/or prevent clinically significant deterioration that could result in the failure of one or more body systems and/or organ systems due to respiratory distress, hypoxia, cardiac dysrhythmia.        Driss Castanon PA-C   10/16/22  Pulmonary, Critical Care Medicine  [address] [place] [address] [facility]

## 2022-10-16 NOTE — PROGRESS NOTES
RENAL PROGRESS NOTE        [facility]         Assessment  - ELY , secondary to CRS from sever CHF / hypotension   - Acute HFrEF / diatsolic dysfunction with RV dysfunction with cardiogenic shock   - Acute Resp failure secondary to CHF   - Lactic acidosis , improved   - HypoPO4   - Anemia   - A-fib with RVR   - ?  Septic shock     Plan   - Patient on CRRT , she became more hypotensive yesterday so needed to run even , today she is doing little better , weaning pressors , can try UF 25 ml / h and see how she will do with that   - Replacing Lytes per protocol   - IV Albumin PRN , Midodrine for BP support   - IV Milrinone , Cardiology following    - IV Abx   - Discussed with PCCM   - Very guarded prognosis , comfort care is a good option in her case                                                                                                                                        Subjective:  Patient intubated on CRRT       Patient Active Problem List   Diagnosis Code    CHF (congestive heart failure) ([facility]) I50.9    Acute on chronic systolic and diastolic heart failure, NYHA class 4 ([facility]) I50.43    Fluid overload E87.70    Carotid artery disease ([facility]) I77.9    Pneumonia J18.9    UTI (urinary tract infection) N39.0       Current Facility-Administered Medications   Medication Dose Route Frequency Provider Last Rate Last Admin    potassium phosphate 20 mmol in 0.9% sodium chloride 250 mL infusion   IntraVENous ONCE Roseanne Pereira PA-C 64.2 mL/hr at 10/16/22 0931 New Bag at 10/16/22 0931    vancomycin (VANCOCIN) 1250 mg in  ml infusion  1,250 mg IntraVENous ONCE Bridger Dallas MD        [Held by provider] heparin (porcine) injection 5,000 Units  5,000 Units SubCUTAneous Q8H Satinder Vera PA        vasopressin (VASOSTRICT) 20 Units in 0.9% sodium chloride 100 mL infusion  0.03 Units/min IntraVENous CONTINUOUS Nadir Franklin T DO 9 mL/hr at 10/16/22 0835 0.03 Units/min at 10/16/22 0835    anidulafungin (ERAXIS) 100 mg in 0.9% sodium chloride 130 mL IVPB  100 mg IntraVENous Q24H Bryan GOMEZ MD        meropenem (MERREM) 2 g in 0.9% sodium chloride 100 mL IVPB  2 g IntraVENous Q12H Bryan GOMEZ MD 33.3 mL/hr at 10/16/22 0931 2 g at 10/16/22 0931    polyvinyl alcohol-povidon(PF) (REFRESH CLASSIC) 1.4-0.6 % ophthalmic solution 1 Drop  1 Drop Both Eyes PRN Heaven Vera PA   1 Drop at 10/16/22 8074    hydrocortisone Sod Succ (PF) (SOLU-CORTEF) injection 50 mg  50 mg IntraVENous Q6H Heaven Vera PA   50 mg at 10/16/22 0532    milrinone (PRIMACOR) 20 MG/100 ML D5W infusion  0.375 mcg/kg/min IntraVENous CONTINUOUS Jamin Loja MD 6.3 mL/hr at 10/16/22 0727 0.375 mcg/kg/min at 10/16/22 1752    0.9% sodium chloride infusion 250 mL  250 mL IntraVENous PRN Roseanne Pereira PA-C        VANCOMYCIN INFORMATION NOTE   Other Rx Dosing/Monitoring Nadir Franklin,         insulin glargine (LANTUS) injection 6 Units  6 Units SubCUTAneous DAILY Dean Yuan PA-C   6 Units at 10/16/22 0930    pantoprazole (PROTONIX) 40 mg in 0.9% sodium chloride 10 mL injection  40 mg IntraVENous DAILY Santiago DUNHAM NP   40 mg at 10/16/22 0929    bicarbonate dialysis (PRISMASOL) BG K 4/Ca 2.5 5000 ml solution   Extracorporeal DIALYSIS CONTINUOUS Sherry Garsia  mL/hr at 10/16/22 1020 New Bag at 10/16/22 1020    bicarbonate dialysis (PRISMASOL) BG K 4/Ca 2.5 5000 ml solution   Extracorporeal DIALYSIS CONTINUOUS Sherry Garsia MD 1,000 mL/hr at 10/16/22 1000 New Bag at 10/16/22 1000    chlorhexidine (PERIDEX) 0.12 % mouthwash 10 mL  10 mL Oral Q12H Farzana Sullivan PA-C   10 mL at 10/16/22 0929    sodium chloride (NS) flush 5-40 mL  5-40 mL IntraVENous PRN Farzana Sullivan PA-C        fentaNYL (PF) 1,500 mcg/30 mL (50 mcg/mL) infusion  0-150 mcg/hr IntraVENous TITRATE Nadir Franklin T DO 3 mL/hr at 10/16/22 0726 150 mcg/hr at 10/16/22 0726    insulin lispro (HUMALOG) injection   SubCUTAneous Q6H Laurie JUSTA Yanes   2 Units at 10/16/22 0548    glucose chewable tablet 16 g  4 Tablet Oral PRN Farzana Sullivan PA-C        glucagon (GLUCAGEN) injection 1 mg  1 mg IntraMUSCular PRN Farzana Sullivan PA-C        dextrose 10% infusion 0-250 mL  0-250 mL IntraVENous PRN Farzana Sullivan PA-C        dexmedeTOMidine in 0.9 % NaCl (PRECEDEX) 400 mcg/100 mL (4 mcg/mL) infusion soln  0.1-1.5 mcg/kg/hr IntraVENous TITRATE Torie De La Rosa MD 16.9 mL/hr at 10/16/22 0729 1.2 mcg/kg/hr at 10/16/22 0729    fentaNYL citrate (PF) injection 100 mcg  100 mcg IntraVENous Q2H PRN Farzana Sullivan PA-C        midazolam (VERSED) injection 1 mg  1 mg IntraVENous Q2H PRN Farzana Sullivan PA-C   1 mg at 10/14/22 0537    NOREPINephrine (LEVOPHED) 32,000 mcg in dextrose 5% 250 mL (128 mcg/mL) infusion  0.5-30 mcg/min IntraVENous TITRATE Robyn DUNHAM NP 3.3 mL/hr at 10/16/22 1000 7 mcg/min at 10/16/22 1000    aspirin delayed-release tablet 81 mg  81 mg Oral DAILY Tom Garcia MD   81 mg at 10/16/22 0929    atorvastatin (LIPITOR) tablet 80 mg  80 mg Oral QHS Tom Garcia MD   80 mg at 10/15/22 2125    [Held by provider] clopidogreL (PLAVIX) tablet 75 mg  75 mg Oral DAILY Dick Dallas MD        cyanocobalamin tablet 1,000 mcg  1,000 mcg Oral DAILY Tom Garcia MD   1,000 mcg at 10/16/22 5621    [Held by provider] escitalopram oxalate (LEXAPRO) tablet 15 mg  15 mg Oral DAILY Maicol Dallas MD   15 mg at 10/12/22 1155    ferrous sulfate tablet 325 mg  325 mg Oral ACB Dick Dallas MD   325 mg at 10/16/22 0929    memantine (NAMENDA) tablet 10 mg  10 mg Oral BID Tom Garcia MD   10 mg at 10/16/22 0549    [Held by provider] metoprolol tartrate (LOPRESSOR) tablet 25 mg  25 mg Oral TID Tom Garcia MD   25 mg at 10/11/22 1707    [Held by provider] lisinopriL (PRINIVIL, ZESTRIL) tablet 5 mg  5 mg Oral DAILY Maicol Dallas MD        albuterol-ipratropium (DUO-NEB) 2.5 MG-0.5 MG/3 ML  3 mL Nebulization Q4H RT Burt Kat Serrano MD   3 mL at 10/16/22 5561    acetaminophen (TYLENOL) tablet 650 mg  650 mg Oral Q6H PRN Tereza Gonzalez DO        midodrine (PROAMATINE) tablet 10 mg  10 mg Oral TID Vijaya Rahmanamento E, DO   10 mg at 10/16/22 4569    nitroglycerin (NITROBID) 2 % ointment 1 Inch  1 Inch Topical Q6H PRN Nico Nunez DO        albuterol-ipratropium (DUO-NEB) 2.5 MG-0.5 MG/3 ML  3 mL Nebulization Q6H PRN Tereza Gonzalez DO         Facility-Administered Medications Ordered in Other Encounters   Medication Dose Route Frequency Provider Last Rate Last Admin    etomidate (AMIDATE) 2 mg/mL injection   IntraVENous PRN Rosanna Jessica, CRNA   10 mg at 10/12/22 0738    succinylcholine (ANECTINE) 20 mg/mL syringe   IntraVENous PRN Rosanna Jessica, CRNA   80 mg at 10/12/22 0738       Objective  Vitals:    10/16/22 0800 10/16/22 0809 10/16/22 0830 10/16/22 0900   BP: (!) 102/48  103/60 (!) 103/46   Pulse: (!) 117 (!) 121 (!) 122 (!) 128   Resp: 22 28 21 22   Temp: 98.5 °F (36.9 °C)   98.8 °F (37.1 °C)   SpO2: 93% 94% 91% 94%   Weight:       Height:             Intake/Output Summary (Last 24 hours) at 10/16/2022 1020  Last data filed at 10/16/2022 1000  Gross per 24 hour   Intake 4090.07 ml   Output 2539 ml   Net 1551.07 ml             Admission weight: Weight: 59 kg (130 lb) (10/10/22 5884)  Last Weight Metrics:  Weight Loss Metrics 10/15/2022 9/29/2022 7/21/2022 6/5/2022 3/12/2022 2/14/2022 1/31/2022   Today's Wt 123 lb 10.9 oz 111 lb 15.9 oz 112 lb 1.6 oz 139 lb 8 oz 140 lb 140 lb 140 lb   BMI 21.91 kg/m2 19.84 kg/m2 19.86 kg/m2 25.51 kg/m2 25.61 kg/m2 24.03 kg/m2 24.03 kg/m2             Physical Assessment:     General: Acutely ill / Intubated on CRRT   Neck: No jvd. LUNGS: Coarse BS B/L   CVS EXM: S1, S2  RRR, no murmurs/gallops/rubs. Abdomen: soft, non tender. Lower Extremities:  ++ edema.     : Valverde catheter       Lab    CBC w/Diff Recent Labs     10/16/22  0605 10/15/22  1815 10/15/22  0204 10/14/22  1805 10/14/22  1252 WBC 13.1  --  13.7* 11.6 14.9*   RBC 2.46*  --  2.26* 2.35* 2.40*   HGB 7.7* 6.3* 7.3* 7.7* 7.7*   HCT 23.3* 19.4* 23.5* 24.6* 25.1*   PLT 55*  --  106* 137 129*   GRANS 91*  --  90*  --  85*   LYMPH 3*  --  4*  --  10*   EOS 0  --  0  --  0          Chemistry Recent Labs     10/16/22  0605 10/16/22  0210 10/15/22  2205 10/15/22  1815 10/15/22  1048   * 130* 100*   < > 174*   * 137 136   < > 135*   K 4.8 4.6 4.7   < > 4.7    105 104   < > 104   CO2 22 24 24   < > 23   BUN 16 17 17   < > 20*   CREA 1.25 1.14 1.31*   < > 1.35*   CA 8.2* 8.1* 8.4*   < > 8.1*   AGAP 9 8 8   < > 8   BUCR 13 15 13   < > 15   *  --   --   --  134*   TP 6.1*  --   --   --  6.1*   ALB 3.2* 3.2* 3.3*   < > 2.8*   GLOB 2.9  --   --   --  3.3   AGRAT 1.1  --   --   --  0.8   PHOS 1.8* 1.6* 1.7*   < > 2.6    < > = values in this interval not displayed.            No results found for: IRON, FE, TIBC, IBCT, PSAT, FERR   Lab Results   Component Value Date/Time    Calcium 8.2 (L) 10/16/2022 06:05 AM    Phosphorus 1.8 (L) 10/16/2022 06:05 AM          Clayton Manley MD  10/16/2022  6:50 PM

## 2022-10-16 NOTE — PROGRESS NOTES
Reason for Renal Dosing:  Per Renal Dosing Policy    Patient clinical status and labs ordered/reviewed. Pt Weight Weight: 56.1 kg (123 lb 10.9 oz)   Serum Creatinine Lab Results   Component Value Date/Time    Creatinine 1.26 10/16/2022 10:24 AM    Creatinine (POC) 1.35 (H) 10/12/2022 02:40 PM       Creatinine Clearance Estimated Creatinine Clearance: 27.5 mL/min (based on SCr of 1.26 mg/dL). BUN Lab Results   Component Value Date/Time    BUN 17 10/16/2022 10:24 AM       WBC Lab Results   Component Value Date/Time    WBC 13.1 10/16/2022 06:05 AM      Temperature Temp: 98.2 °F (36.8 °C)   HR Pulse (Heart Rate): (!) 119     BP BP: 101/74           Drug type: Non-Antibiotic      Drug/dose: was adjusted to : Digoxin 125 mcg every 24 hours was adjusted to Digoxin 62.5 mcg (0.0625 mg) due to patient's renal function and Dialysis status, per P&T Committee Protocol    Continue to monitor.     Signed Jeannine Springer PHARMD  Date 10/16/2022  Time 2:19 PM

## 2022-10-16 NOTE — PROGRESS NOTES
Cardiology Progress Note    Admit Date: 10/10/2022      Assessment:     --Cardiogenic shock due to severe biventricular heart failure. This appears acute on chronic over the past 12+ months. Severely depressed LVEF 20% or less this admission. Severe RV dysfunction as well. --Acute respiratory failure. Likely due to above. Patient intubated 4 days ago, still requiring increased ventilatory support with high PEEP and increased FiO2. --Ischemic cardiomyopathy. EF 30-35%, 1 year ago in [address] [place] [address] which has become worse now possibly due to progressive CAD. EF 20% at most currently. She is not a candidate for revascularization therapies or advanced heart failure therapies. --Coronary artery disease. History of multiple PCI/stenting procedures in [facility], last in June 2021 with MIRELLA to her left circumflex. She has multiple prior stents to her RCA and left circumflex as well. Her LAD has known moderate heavily calcified disease. Troponins do not suggest an acute MI as the cause for her decline. --Longstanding persistent atrial fibrillation. Rapid rates likely exacerbated by worsening cardiogenic shock. Not a candidate for anticoagulation with worsening anemia and thrombocytopenia. --Cardiorenal syndrome secondary to above  --Possible sepsis. No clear source identified. --Worsening anemia. --Thrombocytopenia. Worse today. --Acute renal failure. Now requiring CVVHD          Plan: We will continue IV milrinone which was started yesterday  Will add daily IV digoxin dosing to help with rate control  Attempt to wean vasopressors if hemodynamics will allow. Overall very poor prognosis. I discussed at length with the patient's son today at bedside. 24-hour:     Patient remains intubated and sedated. Briefly able to decrease norepinephrine dose overnight but then had to increase again this morning. A. fib rates somewhat improved with IV digoxin load and milrinone.     Objective: Patient Vitals for the past 8 hrs:   Temp Pulse Resp BP SpO2   10/16/22 1300 -- (!) 119 29 101/74 93 %   10/16/22 1230 -- (!) 123 (!) 31 122/71 94 %   10/16/22 1221 -- (!) 118 30 -- 90 %   10/16/22 1200 98.2 °F (36.8 °C) (!) 105 (!) 31 92/75 90 %   10/16/22 1157 -- (!) 117 27 -- 91 %   10/16/22 1156 -- (!) 119 22 -- 92 %   10/16/22 1155 -- (!) 117 29 -- 92 %   10/16/22 1150 -- (!) 109 (!) 32 -- (!) 87 %   10/16/22 1130 -- (!) 114 26 (!) 106/52 97 %   10/16/22 1100 -- (!) 120 22 (!) 104/53 97 %   10/16/22 1030 -- (!) 122 25 (!) 88/66 90 %   10/16/22 1000 -- (!) 120 24 (!) 118/59 94 %   10/16/22 0930 -- (!) 122 27 90/62 (!) 89 %   10/16/22 0900 98.8 °F (37.1 °C) (!) 128 22 (!) 103/46 94 %   10/16/22 0830 -- (!) 122 21 103/60 91 %   10/16/22 0809 -- (!) 121 28 -- 94 %   10/16/22 0800 98.5 °F (36.9 °C) (!) 117 22 (!) 102/48 93 %   10/16/22 0730 -- (!) 112 25 (!) 108/59 97 %   10/16/22 0700 98.4 °F (36.9 °C) (!) 109 26 (!) 114/56 97 %   10/16/22 0645 -- (!) 116 25 (!) 117/54 94 %   10/16/22 0630 -- (!) 112 24 (!) 122/59 96 %   10/16/22 0615 -- (!) 133 25 114/74 93 %   10/16/22 0600 -- (!) 123 25 (!) 107/52 96 %         Patient Vitals for the past 96 hrs:   Weight   10/15/22 0530 56.1 kg (123 lb 10.9 oz)   10/14/22 0357 55.5 kg (122 lb 5.7 oz)       TELE: AFIB               Current Facility-Administered Medications   Medication Dose Route Frequency Last Admin    vancomycin (VANCOCIN) 1250 mg in  ml infusion  1,250 mg IntraVENous ONCE 1,250 mg at 10/16/22 1200    [START ON 10/17/2022] docusate sodium (COLACE) capsule 100 mg  100 mg Oral DAILY      polyethylene glycol (MIRALAX) packet 17 g  17 g Oral DAILY PRN      [START ON 10/17/2022] digoxin (LANOXIN) injection 125 mcg  125 mcg IntraVENous DAILY      [Held by provider] heparin (porcine) injection 5,000 Units  5,000 Units SubCUTAneous Q8H      vasopressin (VASOSTRICT) 20 Units in 0.9% sodium chloride 100 mL infusion  0.03 Units/min IntraVENous CONTINUOUS 0.03 Units/min at 10/16/22 0835    anidulafungin (ERAXIS) 100 mg in 0.9% sodium chloride 130 mL IVPB  100 mg IntraVENous Q24H 100 mg at 10/16/22 1329    meropenem (MERREM) 2 g in 0.9% sodium chloride 100 mL IVPB  2 g IntraVENous Q12H 2 g at 10/16/22 0931    polyvinyl alcohol-povidon(PF) (REFRESH CLASSIC) 1.4-0.6 % ophthalmic solution 1 Drop  1 Drop Both Eyes PRN 1 Drop at 10/16/22 0929    hydrocortisone Sod Succ (PF) (SOLU-CORTEF) injection 50 mg  50 mg IntraVENous Q6H 50 mg at 10/16/22 0532    milrinone (PRIMACOR) 20 MG/100 ML D5W infusion  0.375 mcg/kg/min IntraVENous CONTINUOUS 0.375 mcg/kg/min at 10/16/22 0727    0.9% sodium chloride infusion 250 mL  250 mL IntraVENous PRN      VANCOMYCIN INFORMATION NOTE   Other Rx Dosing/Monitoring      insulin glargine (LANTUS) injection 6 Units  6 Units SubCUTAneous DAILY 6 Units at 10/16/22 0930    pantoprazole (PROTONIX) 40 mg in 0.9% sodium chloride 10 mL injection  40 mg IntraVENous DAILY 40 mg at 10/16/22 0929    bicarbonate dialysis (PRISMASOL) BG K 4/Ca 2.5 5000 ml solution   Extracorporeal DIALYSIS CONTINUOUS New Bag at 10/16/22 1020    bicarbonate dialysis (PRISMASOL) BG K 4/Ca 2.5 5000 ml solution   Extracorporeal DIALYSIS CONTINUOUS New Bag at 10/16/22 1000    chlorhexidine (PERIDEX) 0.12 % mouthwash 10 mL  10 mL Oral Q12H 10 mL at 10/16/22 0929    sodium chloride (NS) flush 5-40 mL  5-40 mL IntraVENous PRN      fentaNYL (PF) 1,500 mcg/30 mL (50 mcg/mL) infusion  0-150 mcg/hr IntraVENous TITRATE 150 mcg/hr at 10/16/22 0726    insulin lispro (HUMALOG) injection   SubCUTAneous Q6H 2 Units at 10/16/22 1340    glucose chewable tablet 16 g  4 Tablet Oral PRN      glucagon (GLUCAGEN) injection 1 mg  1 mg IntraMUSCular PRN      dextrose 10% infusion 0-250 mL  0-250 mL IntraVENous PRN      dexmedeTOMidine in 0.9 % NaCl (PRECEDEX) 400 mcg/100 mL (4 mcg/mL) infusion soln  0.1-1.5 mcg/kg/hr IntraVENous TITRATE 1.2 mcg/kg/hr at 10/16/22 1327    fentaNYL citrate (PF) injection 100 mcg  100 mcg IntraVENous Q2H PRN      midazolam (VERSED) injection 1 mg  1 mg IntraVENous Q2H PRN 1 mg at 10/14/22 0537    NOREPINephrine (LEVOPHED) 32,000 mcg in dextrose 5% 250 mL (128 mcg/mL) infusion  0.5-30 mcg/min IntraVENous TITRATE 15 mcg/min at 10/16/22 1226    aspirin delayed-release tablet 81 mg  81 mg Oral DAILY 81 mg at 10/16/22 0929    atorvastatin (LIPITOR) tablet 80 mg  80 mg Oral QHS 80 mg at 10/15/22 2125    [Held by provider] clopidogreL (PLAVIX) tablet 75 mg  75 mg Oral DAILY      cyanocobalamin tablet 1,000 mcg  1,000 mcg Oral DAILY 1,000 mcg at 10/16/22 7338    [Held by provider] escitalopram oxalate (LEXAPRO) tablet 15 mg  15 mg Oral DAILY 15 mg at 10/12/22 1155    ferrous sulfate tablet 325 mg  325 mg Oral  mg at 10/16/22 0929    memantine (NAMENDA) tablet 10 mg  10 mg Oral BID 10 mg at 10/16/22 8477    [Held by provider] metoprolol tartrate (LOPRESSOR) tablet 25 mg  25 mg Oral TID 25 mg at 10/11/22 1707    [Held by provider] lisinopriL (PRINIVIL, ZESTRIL) tablet 5 mg  5 mg Oral DAILY      albuterol-ipratropium (DUO-NEB) 2.5 MG-0.5 MG/3 ML  3 mL Nebulization Q4H RT 3 mL at 10/16/22 1221    acetaminophen (TYLENOL) tablet 650 mg  650 mg Oral Q6H PRN      midodrine (PROAMATINE) tablet 10 mg  10 mg Oral TID 10 mg at 10/16/22 0929    nitroglycerin (NITROBID) 2 % ointment 1 Inch  1 Inch Topical Q6H PRN      albuterol-ipratropium (DUO-NEB) 2.5 MG-0.5 MG/3 ML  3 mL Nebulization Q6H PRN       Facility-Administered Medications Ordered in Other Encounters   Medication Dose Route Frequency Last Admin    etomidate (AMIDATE) 2 mg/mL injection   IntraVENous PRN 10 mg at 10/12/22 0738    succinylcholine (ANECTINE) 20 mg/mL syringe   IntraVENous PRN 80 mg at 10/12/22 0738         Intake/Output Summary (Last 24 hours) at 10/16/2022 1351  Last data filed at 10/16/2022 1300  Gross per 24 hour   Intake 4240.64 ml   Output 2651 ml   Net 1589.64 ml       Physical Exam:  General: Intubated and sedated  Neck: +JVD  Lungs: Coarse breath sounds throughout with diminished sounds at the bases. Heart:  irregularly irregular rhythm  Abdomen:  abdomen is soft without significant tenderness, masses, organomegaly or guarding  Extremities:  no edema    Visit Vitals  /74   Pulse (!) 119   Temp 98.2 °F (36.8 °C)   Resp 29   Ht 5' 3\" (1.6 m)   Wt 56.1 kg (123 lb 10.9 oz)   SpO2 93%   Breastfeeding No   BMI 21.91 kg/m²       Data Review:     Labs: Results:       Chemistry Recent Labs     10/16/22  1024 10/16/22  0605 10/16/22  0210 10/15/22  1815 10/15/22  1048   * 159* 130*   < > 174*    135* 137   < > 135*   K 4.7 4.8 4.6   < > 4.7    104 105   < > 104   CO2 23 22 24   < > 23   BUN 17 16 17   < > 20*   CREA 1.26 1.25 1.14   < > 1.35*   CA 8.3* 8.2* 8.1*   < > 8.1*   MG 2.6 2.5 2.6   < > 2.4   PHOS 1.7* 1.8* 1.6*   < > 2.6   AGAP 8 9 8   < > 8   BUCR 13 13 15   < > 15   AP  --  306*  --   --  134*   TP  --  6.1*  --   --  6.1*   ALB 3.1* 3.2* 3.2*   < > 2.8*   GLOB  --  2.9  --   --  3.3   AGRAT  --  1.1  --   --  0.8    < > = values in this interval not displayed.       CBC w/Diff Recent Labs     10/16/22  0605 10/15/22  1815 10/15/22  0204 10/14/22  1805 10/14/22  1252   WBC 13.1  --  13.7* 11.6 14.9*   RBC 2.46*  --  2.26* 2.35* 2.40*   HGB 7.7* 6.3* 7.3* 7.7* 7.7*   HCT 23.3* 19.4* 23.5* 24.6* 25.1*   PLT 55*  --  106* 137 129*   GRANS 91*  --  90*  --  85*   LYMPH 3*  --  4*  --  10*   EOS 0  --  0  --  0      Cardiac Enzymes No results found for: CPK, CK, CKMMB, CKMB, RCK3, CKMBT, CKNDX, CKND1, DIALLO, TROPT, TROIQ, VIJAY, TROPT, TNIPOC, BNP, BNPP   Coagulation Recent Labs     10/16/22  0605 10/15/22  1310 10/15/22  0204 10/14/22  1252   PTP 15.9* 15.4*  --   --    INR 1.2 1.2  --   --    APTT  --   --  38.0* 111.4*       Lipid Panel Lab Results   Component Value Date/Time    Cholesterol, total 125 07/19/2022 06:09 AM    HDL Cholesterol 33 (L) 07/19/2022 06:09 AM    LDL,Direct 88 07/19/2022 06:09 AM Triglyceride 82 07/19/2022 06:09 AM    CHOL/HDL Ratio 3.8 07/19/2022 06:09 AM      BNP Lab Results   Component Value Date/Time    BNP 1,114 (H) 07/17/2022 07:48 PM     (H) 06/02/2022 06:45 PM     (H) 01/31/2022 12:15 AM      Liver Enzymes Recent Labs     10/16/22  1024 10/16/22  0605   TP  --  6.1*   ALB 3.1* 3.2*   AP  --  306*      Thyroid Studies Lab Results   Component Value Date/Time    TSH 2.820 07/19/2022 06:09 AM          Signed By: Margareth Foster MD     October 16, 2022

## 2022-10-16 NOTE — PROGRESS NOTES
10/16/22 1922   Patient Observations   Pulse (Heart Rate) (!) 106   Resp Rate 26   O2 Sat (%) 98 %   Airway - Endotracheal Tube 10/12/22 Oral   Placement Date/Time: 10/12/22 (c) 1368   Number of Attempts: 1  Location: Oral  Placement Verified: Auscultation;BBS;EtCO2  Airway Types: Endotracheal, cuffed  Airway Tube Size: 7.5 mm  Technique: Direct Laryngoscopy  Blade Type: Youssef  Anesthesia ET. .. Insertion Depth (cm) 21 cm   Line Иван Lips   Side Secured Device; Centered   Site Assessment Clean, dry, & intact   Respiratory   Respiratory (WDL) X   Patient on Vent Yes - If patient is on vent, add Doc Flowsheet Ventilator ().    Respiratory Pattern Regular   Upper Airway Sounds Coarse   Chest/Tracheal Assessment Chest expansion, symmetrical   Breath Sounds Bilateral Coarse   Cough Weak   Airway Clearance   Suction ET Tube   Suction Device Inline suction catheter   Sputum Method Obtained Endotracheal   Sputum Amount Scant   Sputum Color/Odor White   Sputum Consistency Thin   Vent Settings   FIO2 (%) 50 %   SpO2/FIO2 Ratio 196   CMV Rate Set 26   Back-Up Rate 26   PC Set 20   PEEP/VENT (cm H2O) 10 cm H20   Insp Time (sec) 0.65 sec   Insp Rise Time % 50 %   Flow Trigger 3   Ventilator Measurements   Resp Rate Observed 26   Vt Exhaled (Machine Breath) (ml) 465 ml   Ve Observed (l/min) 12.2 l/min   PIP Observed (cm H2O) 32 cm H2O   MAP (cm H2O) 19   I:E Ratio Actual 1:2.6   Safety & Alarms   Circuit Temperature 97.3 °F (36.3 °C)   Backup Mode Checked/Apnea Yes   Pressure Max 45 cm H2O   Ve Min 2   Ve Max 20   Vt Min 200 ml   Vt Max 800 ml   RR Max 40   Ambu Bag Yes   Ambu Mask Yes   Age Specific Ventilator Associated Pneumonia Bundle   Patient Age Group Adult   Oxygen Therapy   Skin Assessment Clean, dry, & intact   Vent Method/Mode   Ventilation Method Conventional   Ventilator Mode Pressure control   Procedures   $$ Subsequent Procedure Aerosol   Delivery Source In-line nebulizer   Pulmonary Toilet   Pulmonary [address] [place]

## 2022-10-16 NOTE — ROUTINE PROCESS
Bedside and Verbal shift change report given to Zion Medina RN (oncoming nurse) by Deirdre Godoy RN (offgoing nurse). Report included the following information SBAR, Kardex, Intake/Output, MAR, Recent Results, and Cardiac Rhythm . Brenda Noel

## 2022-10-16 NOTE — PROGRESS NOTES
[address] [facility] Pharmacokinetic Monitoring Service - Vancomycin    Consulting Provider: David Baez MD  Indication: Pneumonia (CAP)  Target Concentration:  Dosing based on anticipated concentration <15 mg/L. CVVH  Day of Therapy:  6 of 7   Additional Antimicrobials: Meropenem    Pertinent Laboratory Values: Wt Readings from Last 1 Encounters:   10/15/22 56.1 kg (123 lb 10.9 oz)     Temp Readings from Last 1 Encounters:   10/16/22 98.4 °F (36.9 °C)     Recent Labs     10/16/22  0605 10/16/22  0210 10/15/22  2205 10/15/22  1815   CREA 1.25 1.14 1.31* 1.36*   BUN 16 17 17 18     No components found for: PROCAL  Estimated Creatinine Clearance: 25.3 mL/min (A) (based on SCr of 1.36 mg/dL (H)). Recent Labs     10/16/22  0605 10/15/22  0204   WBC 13.1 13.7*       Pertinent Cultures:  Culture Date Source Results   10/10 blood NO GROWTH 5 DAYS   10/10 urine No significant growth, <10,000 CFU/mL   10/12 Sputum RARE GRAM-POSITIVE COCCI IN CLUSTERS   10/14 Blood NO GROWTH AFTER 2 HOURS   10/15 Sputum Pending   MRSA Nasal Swab: MRSA NOT PRESENT       Assessment:  Date/Time Current Dose Concentration Timing of Concentration (h) AUC   10/11 2152 1000 mg x1     -   10/12 0550 - 7.4 8 N/A   10/12 1211 750 mg x 1         10/13 0409 - 12.1 16     10/13 0613 750 mg IV x 1      10/14 0530 750 mg x 1      10/14 1252 - 19     10/14 1800 500 mg x 1      10/15 0204 - 21.1 8    10/16 0605 - 14 36 -   10/16 1000 1250 mg x 1      Note: Serum concentrations collected for AUC dosing may appear elevated if collected in close proximity to the dose administered, this is not necessarily an indication of toxicity    Plan:  Vancomycin 1250 mg IV x 1  No need for level, 2nd to last day of therapy.     Thank you for the consult,  ADRI Allen  10/13/2022 5:21 AM

## 2022-10-16 NOTE — PROGRESS NOTES
Problem: Falls - Risk of  Goal: *Absence of Falls  Description: Document Cherylle Cockayne Fall Risk and appropriate interventions in the flowsheet. Outcome: Progressing Towards Goal  Note: Fall Risk Interventions:       Mentation Interventions: Reorient patient, Toileting rounds    Medication Interventions: Assess postural VS orthostatic hypotension    Elimination Interventions: Toileting schedule/hourly rounds              Problem: Pressure Injury - Risk of  Goal: *Prevention of pressure injury  Description: Document Sushant Scale and appropriate interventions in the flowsheet.   Outcome: Progressing Towards Goal  Note: Pressure Injury Interventions:  Sensory Interventions: Assess changes in LOC, Pressure redistribution bed/mattress (bed type)    Moisture Interventions: Apply protective barrier, creams and emollients, Check for incontinence Q2 hours and as needed    Activity Interventions: Pressure redistribution bed/mattress(bed type)    Mobility Interventions: HOB 30 degrees or less, Float heels, Pressure redistribution bed/mattress (bed type)    Nutrition Interventions: Document food/fluid/supplement intake    Friction and Shear Interventions: HOB 30 degrees or less, Foam dressings/transparent film/skin sealants, Lift sheet                Problem: Ventilator Management  Goal: *Adequate oxygenation and ventilation  Outcome: Progressing Towards Goal  Goal: *Patient maintains clear airway/free of aspiration  Outcome: Progressing Towards Goal  Goal: *Absence of infection signs and symptoms  Outcome: Progressing Towards Goal     Problem: Nutrition Deficit  Goal: *Optimize nutritional status  Outcome: Progressing Towards Goal

## 2022-10-17 NOTE — DIABETES MGMT
Diabetes/ Glycemic Control Plan of Care  Recommendations: Continue current insulin. Assessment: BG  readings are in the  target range on 6 units Lantus daily. .  DX:   1. Hypoxia    2. Community acquired pneumonia of right lower lobe of lung    3. Acute on chronic systolic congestive heart failure (HCC)       Fasting/ Morning blood glucose:   Lab Results   Component Value Date/Time    Glucose 133 (H) 10/17/2022 08:00 AM    Glucose (POC) 158 (H) 10/17/2022 06:04 AM     IV Fluids containing dextrose: none  Steroids: solucortef 50 mg IV q 6 hrs    Within target range (70-180mg/dL):  yes  Current insulin orders:   6 units Lantus plus corrctive  lispro  Total Daily Dose previous 24 hours = 6 units Lantus plus 6 units lispro= 12 units  Current A1c:   Lab Results   Component Value Date/Time    Hemoglobin A1c 5.6 10/12/2022 09:54 AM      equivalent  to ave Blood Glucose of 108 mg/dl for 2-3 months prior to admission  Adequate glycemic control PTA: yes  Nutrition/Diet: Glucerna 1.5 TF   Home diabetes medications: none    Plan/Goals:   Blood glucose will be within target of 70 - 180 mg/dl within 72 hours  Reinforce dietary and medication compliance at home if indicated.             Education:  [] Refer to Diabetes Education Record                       [x] Education not indicated at this time -vent Arnell Goltz, RD BC-ADM

## 2022-10-17 NOTE — PROGRESS NOTES
1945  Assumed pt care, report received from Jennifer Weaver.[email or web address].  2012  Vent alarming high pressure, increased Precedex to 1.3mcg/kg/hr. 2249  Per report and renal note, OK to pull 25ml/hr of UF if pt's BP is stable. Pt's levo titrated down to 6mcg, will inc UF to 25 from net 0.    2305  Notified ICU PA of levo down to 5mcg and attempting to pull additional fluid for this hour. Orders received to stop Vaso if BP stable. 2337  Stopped Vaso drip, will continue to monitor VS to prevent hypotension. 0010  Q4H labs drawn from CRRT arterial line and sent to lab.  0200  Stopped pulling UF due to levo inc to 9 mcg. A-line positional, noted manual SBP greater than A-line by 10 points. 6675  Pt breathing over the vent at 36/26, increased Precedex to 1.4 mcg.  0219  Pt moving lower lip, Vent alarming inc pressure, RR still at 36/26, admin Fentanyl 25mcg bolus. 0228  Pt's O2 sat dropping to mid 80's, pt still breathing over the vent at 36/26, admin Versed 1mg IVP for agitation. 0300  VSS, restarted UF at 25ml/hr. 0400  Labs drawn from CRRT radial line and sent to lab.   0509  TMP pressures in the 230's, blood returned. 0544  CRRT cartridge changed, prime and test passed, restarted CRRT.    0729  Bedside shift change report given to Jaswinder Chiang RN (oncoming nurse) by LARA Li RN (offgoing nurse). Report included the following information SBAR, Intake/Output, MAR, Recent Results, Med Rec Status, and Cardiac Rhythm A-Fib.

## 2022-10-17 NOTE — CONSULTS
[facility]: [phone]  [facility]: [phone]     Patient Name: Tommy Benjamin  YOB: 1940    Date of consult: 10/17/22   Reason for Consult: establish goals of care  Requesting Provider: Jalen Foster MD    Primary Care Physician: Trevor Haynes MD      SUMMARY:   Tommy Benjamin is a 78 y.o. female with a past history of CHF with compromised ejection fraction, atrial fibrillation, HTN, CAD, stroke, MI, osteoporosis, who was admitted on 10/10/2022 from home with a diagnosis of dyspnea with pneumonia versus fluid overload. Current medical issues leading to Palliative Medicine involvement include: Pt with a long term life limiting chronic disease process that warrants discussions about her goals of care. .    CHIEF COMPLAINT: Patient intubated and mechanically ventilated    HPI/SUBJECTIVE:    Patient is a 57-year-old  female she has 2 grown children presented to the emergency department with severe shortness of breath and some agitation. She was initially placed on BiPAP.  2 days later she had a rapid response and was intubated and placed on mechanical ventilation. Patient has extensive history of chronic disease processes including severe heart disease [place] class IV. She also has had prior stroke with resulting aphasia. Prior to intubation patient alternated between [place] and [facility]. She also has some mild dementia and anxiety. The patient is:   [] Verbal and participatory  [x] Non-participatory due to: Intubated and mechanically ventilated    GOALS OF CARE:  Patient/Health Care Proxy Stated Goals: Prolong life      TREATMENT PREFERENCES:   Code Status: Full Code         PALLIATIVE DIAGNOSES:   Goals of care/ACP   Acute respiratory failure  Congestive heart failure  Debility       PLAN:   Goals of care/ACP  10/17/2022:  This NP along with Yeyo Ta RN in to see patient at the bedside she was accompanied by her son Tamiko Dawkins patient is intubated and mechanically ventilated unable to participate in a goals of care discussion. Patient's daughter Vikash Cuenca not coming into late this afternoon. Requested Abdiaziz set up a time tomorrow after 10:00 for a family meeting to discuss the goals of care for his mother. He plans to call us tomorrow morning. Goals of care: Full code with full interventions  Acute respiratory failure  Patient progressed from BiPAP to intubation 5 days ago supported with high pressures PEEP 10 and FiO2 60%. Not a candidate at this time for SBT. Patient with likely poor outcome  Congestive heart failure  Latest ejection fraction 20%. Patient with severe heart disease end-stage near card association class IV seen by cardiology today. Patient with ischemic cardiomyopathy and progressive coronary artery disease. Per cardiology she is not a candidate for revascularization therapies or advanced heart failure therapies. Noted that cardiology feels she has a poor short-term prognosis. Debility  Unsure of patient's baseline at this time she has a palliative performance score of around 30%. She is bedbound unable to do any activity due to severe aggression of disease she is total care has severe reduction in her intake to minimal and a drowsy confusion level of consciousness. Patient with a very poor short-term prognosis. She appears to meet hospice criteria if she is able to be weaned from the vent.   Initial consult note routed to primary continuity provider  Communicated plan of care with: Palliative IDT      Advance Care Planning:  [] [facility] Interdisciplinary Team has updated the ACP Navigator with [address] and Patient Capacity    Primary Decision [facility] ([address]):     Medical Interventions: Full interventions   Other Instructions:         As far as possible, the palliative care team has discussed with patient / health care proxy about goals of care / treatment preferences for patient. HISTORY:     History obtained from: chart review   Active Problems:    Pneumonia (10/10/2022)      UTI (urinary tract infection) (10/11/2022)    Past Medical History:   Diagnosis Date    A-fib ([facility])     CAD (coronary artery disease)     Heart Failure    Chronic systolic CHF (congestive heart failure) ([facility])     LVEF 20-25% & Abnormal Diastolic Function by TTE on 6/03/2022    Hypertension     MI (myocardial infarction) ([facility] [address]     Osteoporosis     Stroke [facility])       Past Surgical History:   Procedure Laterality Date    HX BACK SURGERY      AL CARDIAC SURG PROCEDURE UNLIST      stents placed at [facility]      No family history on file. History reviewed, no pertinent family history.   Social History     Tobacco Use    Smoking status: Former    Smokeless tobacco: Never   Substance Use Topics    Alcohol use: No     No Known Allergies   Current Facility-Administered Medications   Medication Dose Route Frequency    aspirin chewable tablet 81 mg  81 mg Per G Tube DAILY    docusate (COLACE) 50 mg/5 mL oral liquid 100 mg  100 mg Per G Tube DAILY    hydrocortisone Sod Succ (PF) (SOLU-CORTEF) injection 25 mg  25 mg IntraVENous Q6H    polyethylene glycol (MIRALAX) packet 17 g  17 g Oral DAILY PRN    digoxin (LANOXIN) injection 62.5 mcg  0.0625 mg IntraVENous Q48H    [Held by provider] heparin (porcine) injection 5,000 Units  5,000 Units SubCUTAneous Q8H    vasopressin (VASOSTRICT) 20 Units in 0.9% sodium chloride 100 mL infusion  0.03 Units/min IntraVENous CONTINUOUS    anidulafungin (ERAXIS) 100 mg in 0.9% sodium chloride 130 mL IVPB  100 mg IntraVENous Q24H    meropenem (MERREM) 2 g in 0.9% sodium chloride 100 mL IVPB  2 g IntraVENous Q12H    polyvinyl alcohol-povidon(PF) (REFRESH CLASSIC) 1.4-0.6 % ophthalmic solution 1 Drop  1 Drop Both Eyes PRN    milrinone (PRIMACOR) 20 MG/100 ML D5W infusion  0.375 mcg/kg/min IntraVENous CONTINUOUS    0.9% sodium chloride infusion 250 mL  250 mL IntraVENous PRN    insulin glargine (LANTUS) injection 6 Units  6 Units SubCUTAneous DAILY    pantoprazole (PROTONIX) 40 mg in 0.9% sodium chloride 10 mL injection  40 mg IntraVENous DAILY    bicarbonate dialysis (PRISMASOL) BG K 4/Ca 2.5 5000 ml solution   Extracorporeal DIALYSIS CONTINUOUS    bicarbonate dialysis (PRISMASOL) BG K 4/Ca 2.5 5000 ml solution   Extracorporeal DIALYSIS CONTINUOUS    chlorhexidine (PERIDEX) 0.12 % mouthwash 10 mL  10 mL Oral Q12H    sodium chloride (NS) flush 5-40 mL  5-40 mL IntraVENous PRN    fentaNYL (PF) 1,500 mcg/30 mL (50 mcg/mL) infusion  0-150 mcg/hr IntraVENous TITRATE    insulin lispro (HUMALOG) injection   SubCUTAneous Q6H    glucose chewable tablet 16 g  4 Tablet Oral PRN    glucagon (GLUCAGEN) injection 1 mg  1 mg IntraMUSCular PRN    dextrose 10% infusion 0-250 mL  0-250 mL IntraVENous PRN    dexmedeTOMidine in 0.9 % NaCl (PRECEDEX) 400 mcg/100 mL (4 mcg/mL) infusion soln  0.1-1.5 mcg/kg/hr IntraVENous TITRATE    fentaNYL citrate (PF) injection 100 mcg  100 mcg IntraVENous Q2H PRN    midazolam (VERSED) injection 1 mg  1 mg IntraVENous Q2H PRN    NOREPINephrine (LEVOPHED) 32,000 mcg in dextrose 5% 250 mL (128 mcg/mL) infusion  0.5-30 mcg/min IntraVENous TITRATE    atorvastatin (LIPITOR) tablet 80 mg  80 mg Oral QHS    [Held by provider] clopidogreL (PLAVIX) tablet 75 mg  75 mg Oral DAILY    cyanocobalamin tablet 1,000 mcg  1,000 mcg Oral DAILY    [Held by provider] escitalopram oxalate (LEXAPRO) tablet 15 mg  15 mg Oral DAILY    ferrous sulfate tablet 325 mg  325 mg Oral ACB    memantine (NAMENDA) tablet 10 mg  10 mg Oral BID    [Held by provider] metoprolol tartrate (LOPRESSOR) tablet 25 mg  25 mg Oral TID    [Held by provider] lisinopriL (PRINIVIL, ZESTRIL) tablet 5 mg  5 mg Oral DAILY    albuterol-ipratropium (DUO-NEB) 2.5 MG-0.5 MG/3 ML  3 mL Nebulization Q4H RT    acetaminophen (TYLENOL) tablet 650 mg  650 mg Oral Q6H PRN    midodrine (PROAMATINE) tablet 10 mg  10 mg Oral TID    nitroglycerin (NITROBID) 2 % ointment 1 Inch  1 Inch Topical Q6H PRN    albuterol-ipratropium (DUO-NEB) 2.5 MG-0.5 MG/3 ML  3 mL Nebulization Q6H PRN     Facility-Administered Medications Ordered in Other Encounters   Medication Dose Route Frequency    etomidate (AMIDATE) 2 mg/mL injection   IntraVENous PRN    succinylcholine (ANECTINE) 20 mg/mL syringe   IntraVENous PRN          Clinical Pain Assessment (nonverbal scale for nonverbal patients): Activity (Movement): Seeking attention through movement or slow, cautious movement    Duration: for how long has pt been experiencing pain (e.g., 2 days, 1 month, years)  Frequency: how often pain is an issue (e.g., several times per day, once every few days, constant)     FUNCTIONAL ASSESSMENT:     Palliative Performance Scale (PPS):  PPS: 40    ECOG  ECOG Status : Ambulatory, but unable to carry out work activities     PSYCHOSOCIAL/SPIRITUAL SCREENING:      Any spiritual / Sikhism concerns:  [] Yes /  [x] No    Caregiver Burnout:  [] Yes /  [x] No /  [] No Caregiver Present      Anticipatory grief assessment:   [x] Normal  / [] Maladaptive        REVIEW OF SYSTEMS:     Systems: constitutional, ears/nose/mouth/throat, respiratory, gastrointestinal, genitourinary, musculoskeletal, integumentary, neurologic, psychiatric, endocrine. Positive findings noted below. Modified ESAS Completed by: provider                       Dyspnea: 5           Stool Occurrence(s): 1   Positive and pertinent negative findings in ROS are noted above in HPI. The following systems were [x] reviewed / [] unable to be reviewed as noted in HPI  Other findings are noted below.      PHYSICAL EXAM:     Constitutional: intubated and mechanically ventilated, pt non interacitve   Eyes: pupils equal, anicteric  ENMT: no nasal discharge, moist mucous membranes  Cardiovascular: regular rhythm, distal pulses intact, soft BP   Respiratory: mechanically ventilated with high pressures and high 02 requirements   Gastrointestinal: soft non-tender, +bowel sounds  Musculoskeletal: no deformity, no tenderness to palpation  Skin: warm, dry  Neurologic: not following commands, moving all extremities    Other: Wt Readings from Last 3 Encounters:   10/16/22 56.5 kg (124 lb 9 oz)   09/29/22 50.8 kg (111 lb 15.9 oz)   07/21/22 50.8 kg (112 lb 1.6 oz)     Blood pressure (!) 113/53, pulse (!) 126, temperature 98.7 °F (37.1 °C), resp. rate 11, height 5' 3\" (1.6 m), weight 56.5 kg (124 lb 9 oz), SpO2 96 %, not currently breastfeeding. Pain:  Pain Scale 1: Adult Nonverbal Pain Scale  Pain Intensity 1: 0                      LAB AND IMAGING FINDINGS:     Lab Results   Component Value Date/Time    WBC 13.9 (H) 10/17/2022 04:00 AM    HGB 7.2 (L) 10/17/2022 04:00 AM    PLATELET 41 (L) 48/32/0776 04:00 AM     Lab Results   Component Value Date/Time    Sodium 135 (L) 10/17/2022 01:00 PM    Potassium 4.4 10/17/2022 01:00 PM    Chloride 104 10/17/2022 01:00 PM    CO2 20 (L) 10/17/2022 01:00 PM    BUN 19 (H) 10/17/2022 01:00 PM    Creatinine 1.14 10/17/2022 01:00 PM    Calcium 7.4 (L) 10/17/2022 01:00 PM    Magnesium 2.4 10/17/2022 01:00 PM    Phosphorus 2.2 (L) 10/17/2022 01:00 PM      Lab Results   Component Value Date/Time    Alk.  phosphatase 306 (H) 10/17/2022 04:00 AM    Protein, total 5.8 (L) 10/17/2022 04:00 AM    Albumin 2.9 (L) 10/17/2022 01:00 PM    Globulin 2.8 10/17/2022 04:00 AM     Lab Results   Component Value Date/Time    INR 1.2 10/17/2022 04:00 AM    Prothrombin time 15.7 (H) 10/17/2022 04:00 AM    aPTT 38.0 (H) 10/15/2022 02:04 AM      No results found for: IRON, FE, TIBC, IBCT, PSAT, FERR   No results found for: PH, PCO2, PO2  No components found for: [place]   Lab Results   Component Value Date/Time    CK 54 07/19/2022 06:09 AM              Total time: 50 min  Counseling / coordination time, spent as noted above:   > 50% counseling / coordination:  Time spent in direct consultation with the patient, medical team, and family     Prolonged service was provided for  []30 min   []75 min in face to face time in the presence of the patient, spent as noted above. Time Start:   Time End:     Disclaimer: Sections of this note are dictated using utilizing voice recognition software, which may have resulted in some phonetic based errors in grammar and contents. Even though attempts were made to correct all the mistakes, some may have been missed, and remained in the body of the document. If questions arise, please contact our department.

## 2022-10-17 NOTE — PROGRESS NOTES
Cardiology Progress Note    Admit Date: 10/10/2022      Assessment:     --Cardiogenic shock due to severe biventricular heart failure. Severely depressed LVEF 20% or less this admission. Severe RV dysfunction as well. By history this sounds gradually progressive over the past 12 months. --Acute respiratory failure. Likely due to above. Patient intubated 5 days ago, now requiring increased ventilatory support with high PEEP and i increasing FiO2. --Ischemic cardiomyopathy. EF 30-35%, 1 year ago in [address] [place] [address] which has become worse now possibly due to progressive CAD. EF 20% at most currently. She is not a candidate for revascularization therapies or advanced heart failure therapies. --Coronary artery disease. History of multiple PCI/stenting procedures in [facility], last in June 2021 with MIRELLA to her left circumflex. She has multiple prior stents to her RCA and left circumflex as well. Her LAD has known moderate heavily calcified disease. Troponins do not suggest an acute MI as the cause for her decline. --Longstanding persistent atrial fibrillation. Rapid rates likely exacerbated by worsening cardiogenic shock. Not a candidate for anticoagulation with worsening anemia and thrombocytopenia. Heart rates reasonable at this time. --Cardiorenal syndrome secondary to above  --Possible sepsis. No clear source identified. --Worsening anemia. --Thrombocytopenia. --Acute renal failure. Now requiring CVVHD    Poor short-term prognosis. Family to meet with palliative care this week. Plan:     Continue supportive care for now. IV digoxin every other day per pharmacy dosing recommendations. Would continue current milrinone regimen. Volume management per nephrology. Subjective:     Patient remains intubated and sedated.     Objective:      Patient Vitals for the past 8 hrs:   Temp Pulse Resp BP SpO2   10/17/22 1301 -- (!) 115 20 -- 94 %   10/17/22 1230 -- (!) 107 17 -- 90 % 10/17/22 1215 -- (!) 103 17 -- 98 %   10/17/22 1200 -- (!) 114 14 -- (!) 88 %   10/17/22 1145 -- (!) 110 12 -- 90 %   10/17/22 1130 -- (!) 117 12 (!) 115/57 (!) 89 %   10/17/22 1115 -- (!) 124 13 (!) 122/59 90 %   10/17/22 1100 -- (!) 111 12 (!) 122/58 91 %   10/17/22 1045 -- (!) 108 15 (!) 104/51 92 %   10/17/22 1030 -- (!) 129 20 (!) 124/57 95 %   10/17/22 1015 -- (!) 114 24 123/62 96 %   10/17/22 1000 -- (!) 116 20 123/69 92 %   10/17/22 0945 -- (!) 112 21 (!) 89/69 (!) 87 %   10/17/22 0930 -- (!) 117 16 111/69 95 %   10/17/22 0915 -- (!) 126 19 114/62 92 %   10/17/22 0900 -- (!) 106 18 131/61 99 %   10/17/22 0845 -- (!) 105 23 (!) 112/55 94 %   10/17/22 0830 -- (!) 116 21 (!) 99/54 91 %   10/17/22 0815 -- (!) 106 17 (!) 100/49 91 %   10/17/22 0809 -- (!) 114 20 -- 92 %   10/17/22 0800 97.5 °F (36.4 °C) (!) 108 22 (!) 100/48 96 %   10/17/22 0715 -- 99 25 (!) 100/53 95 %   10/17/22 0700 -- (!) 107 25 (!) 102/54 95 %   10/17/22 0645 -- (!) 104 23 (!) 100/47 95 %         Patient Vitals for the past 96 hrs:   Weight   10/16/22 1945 56.5 kg (124 lb 9 oz)   10/15/22 0530 56.1 kg (123 lb 10.9 oz)   10/14/22 0357 55.5 kg (122 lb 5.7 oz)       TELE: AFIB               Current Facility-Administered Medications   Medication Dose Route Frequency Last Admin    aspirin chewable tablet 81 mg  81 mg Per G Tube DAILY 81 mg at 10/17/22 1112    docusate (COLACE) 50 mg/5 mL oral liquid 100 mg  100 mg Per G Tube DAILY 100 mg at 10/17/22 1112    hydrocortisone Sod Succ (PF) (SOLU-CORTEF) injection 25 mg  25 mg IntraVENous Q6H 25 mg at 10/17/22 1112    polyethylene glycol (MIRALAX) packet 17 g  17 g Oral DAILY PRN 17 g at 10/16/22 2120    digoxin (LANOXIN) injection 62.5 mcg  0.0625 mg IntraVENous Q48H 62.5 mcg at 10/16/22 1636    [Held by provider] heparin (porcine) injection 5,000 Units  5,000 Units SubCUTAneous Q8H      vasopressin (VASOSTRICT) 20 Units in 0.9% sodium chloride 100 mL infusion  0.03 Units/min IntraVENous CONTINUOUS 0.03 Units/min at 10/17/22 0912    anidulafungin (ERAXIS) 100 mg in 0.9% sodium chloride 130 mL IVPB  100 mg IntraVENous Q24H 100 mg at 10/17/22 1236    meropenem (MERREM) 2 g in 0.9% sodium chloride 100 mL IVPB  2 g IntraVENous Q12H 2 g at 10/17/22 1008    polyvinyl alcohol-povidon(PF) (REFRESH CLASSIC) 1.4-0.6 % ophthalmic solution 1 Drop  1 Drop Both Eyes PRN 1 Drop at 10/17/22 0240    milrinone (PRIMACOR) 20 MG/100 ML D5W infusion  0.375 mcg/kg/min IntraVENous CONTINUOUS 0.375 mcg/kg/min at 10/17/22 1224    0.9% sodium chloride infusion 250 mL  250 mL IntraVENous PRN      insulin glargine (LANTUS) injection 6 Units  6 Units SubCUTAneous DAILY 6 Units at 10/17/22 0824    pantoprazole (PROTONIX) 40 mg in 0.9% sodium chloride 10 mL injection  40 mg IntraVENous DAILY 40 mg at 10/17/22 0822    bicarbonate dialysis (PRISMASOL) BG K 4/Ca 2.5 5000 ml solution   Extracorporeal DIALYSIS CONTINUOUS New Bag at 10/17/22 0653    bicarbonate dialysis (PRISMASOL) BG K 4/Ca 2.5 5000 ml solution   Extracorporeal DIALYSIS CONTINUOUS New Bag at 10/17/22 0728    chlorhexidine (PERIDEX) 0.12 % mouthwash 10 mL  10 mL Oral Q12H 10 mL at 10/17/22 9859    sodium chloride (NS) flush 5-40 mL  5-40 mL IntraVENous PRN      fentaNYL (PF) 1,500 mcg/30 mL (50 mcg/mL) infusion  0-150 mcg/hr IntraVENous TITRATE 150 mcg/hr at 10/17/22 1234    insulin lispro (HUMALOG) injection   SubCUTAneous Q6H 2 Units at 10/17/22 0605    glucose chewable tablet 16 g  4 Tablet Oral PRN      glucagon (GLUCAGEN) injection 1 mg  1 mg IntraMUSCular PRN      dextrose 10% infusion 0-250 mL  0-250 mL IntraVENous PRN      dexmedeTOMidine in 0.9 % NaCl (PRECEDEX) 400 mcg/100 mL (4 mcg/mL) infusion soln  0.1-1.5 mcg/kg/hr IntraVENous TITRATE 1.2 mcg/kg/hr at 10/17/22 0822    fentaNYL citrate (PF) injection 100 mcg  100 mcg IntraVENous Q2H PRN      midazolam (VERSED) injection 1 mg  1 mg IntraVENous Q2H PRN 1 mg at 10/17/22 0959    NOREPINephrine (LEVOPHED) 32,000 mcg in dextrose 5% 250 mL (128 mcg/mL) infusion  0.5-30 mcg/min IntraVENous TITRATE 16 mcg/min at 10/17/22 1327    atorvastatin (LIPITOR) tablet 80 mg  80 mg Oral QHS 80 mg at 10/16/22 2120    [Held by provider] clopidogreL (PLAVIX) tablet 75 mg  75 mg Oral DAILY      cyanocobalamin tablet 1,000 mcg  1,000 mcg Oral DAILY 1,000 mcg at 10/17/22 0824    [Held by provider] escitalopram oxalate (LEXAPRO) tablet 15 mg  15 mg Oral DAILY 15 mg at 10/12/22 1155    ferrous sulfate tablet 325 mg  325 mg Oral  mg at 10/17/22 0824    memantine (NAMENDA) tablet 10 mg  10 mg Oral BID 10 mg at 10/17/22 0824    [Held by provider] metoprolol tartrate (LOPRESSOR) tablet 25 mg  25 mg Oral TID 25 mg at 10/11/22 1707    [Held by provider] lisinopriL (PRINIVIL, ZESTRIL) tablet 5 mg  5 mg Oral DAILY      albuterol-ipratropium (DUO-NEB) 2.5 MG-0.5 MG/3 ML  3 mL Nebulization Q4H RT 3 mL at 10/17/22 1321    acetaminophen (TYLENOL) tablet 650 mg  650 mg Oral Q6H PRN      midodrine (PROAMATINE) tablet 10 mg  10 mg Oral TID 10 mg at 10/17/22 6743    nitroglycerin (NITROBID) 2 % ointment 1 Inch  1 Inch Topical Q6H PRN      albuterol-ipratropium (DUO-NEB) 2.5 MG-0.5 MG/3 ML  3 mL Nebulization Q6H PRN       Facility-Administered Medications Ordered in Other Encounters   Medication Dose Route Frequency Last Admin    etomidate (AMIDATE) 2 mg/mL injection   IntraVENous PRN 10 mg at 10/12/22 0738    succinylcholine (ANECTINE) 20 mg/mL syringe   IntraVENous PRN 80 mg at 10/12/22 0738         Intake/Output Summary (Last 24 hours) at 10/17/2022 1435  Last data filed at 10/17/2022 1400  Gross per 24 hour   Intake 2924.54 ml   Output 3210 ml   Net -285.46 ml       Physical Exam:  General: Intubated and sedated  Neck:  +JVD  Lungs: Diminished breath sounds throughout, scattered rales bilaterally  Heart:  irregularly irregular rhythm, tachycardic, no appreciable murmurs  Abdomen:  abdomen is soft without significant tenderness, masses, Extremities: extremities without edema    Visit Vitals  BP (!) 115/57   Pulse (!) 115   Temp 97.5 °F (36.4 °C)   Resp 20   Ht 5' 3\" (1.6 m)   Wt 56.5 kg (124 lb 9 oz)   SpO2 94%   Breastfeeding No   BMI 22.06 kg/m²       Data Review:     Labs: Results:       Chemistry Recent Labs     10/17/22  0800 10/17/22  0400 10/17/22  0010 10/16/22  1024 10/16/22  0605 10/15/22  1815 10/15/22  1048   * 140* 151*   < > 159*   < > 174*   * 134* 135*   < > 135*   < > 135*   K 4.3 4.5 4.5   < > 4.8   < > 4.7    103 103   < > 104   < > 104   CO2 24 26 24   < > 22   < > 23   BUN 19* 19* 19*   < > 16   < > 20*   CREA 1.16 1.18 1.23   < > 1.25   < > 1.35*   CA 8.0* 8.4* 8.4*   < > 8.2*   < > 8.1*   MG 2.6 2.6 2.6   < > 2.5   < > 2.4   PHOS 2.3* 1.9* 1.7*   < > 1.8*   < > 2.6   AGAP 6 5 8   < > 9   < > 8   BUCR 16 16 15   < > 13   < > 15   AP  --  306*  --   --  306*  --  134*   TP  --  5.8*  --   --  6.1*  --  6.1*   ALB 2.9* 3.0* 3.0*   < > 3.2*   < > 2.8*   GLOB  --  2.8  --   --  2.9  --  3.3   AGRAT  --  1.1  --   --  1.1  --  0.8    < > = values in this interval not displayed. CBC w/Diff Recent Labs     10/17/22  0400 10/16/22  0605 10/15/22  1815 10/15/22  0204   WBC 13.9* 13.1  --  13.7*   RBC 2.31* 2.46*  --  2.26*   HGB 7.2* 7.7* 6.3* 7.3*   HCT 21.5* 23.3* 19.4* 23.5*   PLT 41* 55*  --  106*   GRANS 90* 91*  --  90*   LYMPH 3* 3*  --  4*   EOS 0 0  --  0      Cardiac Enzymes No results found for: CPK, CK, CKMMB, CKMB, RCK3, CKMBT, CKNDX, CKND1, DIALLO, TROPT, TROIQ, VIJAY, TROPT, TNIPOC, BNP, BNPP   Coagulation Recent Labs     10/17/22  0400 10/16/22  0605 10/15/22  1310 10/15/22  0204   PTP 15.7* 15.9*   < >  --    INR 1.2 1.2   < >  --    APTT  --   --   --  38.0*    < > = values in this interval not displayed.        Lipid Panel Lab Results   Component Value Date/Time    Cholesterol, total 125 07/19/2022 06:09 AM    HDL Cholesterol 33 (L) 07/19/2022 06:09 AM    LDL,Direct 88 07/19/2022 06:09 AM    Triglyceride 82 07/19/2022 06:09 AM    CHOL/HDL Ratio 3.8 07/19/2022 06:09 AM      BNP Lab Results   Component Value Date/Time    BNP 1,114 (H) 07/17/2022 07:48 PM     (H) 06/02/2022 06:45 PM     (H) 01/31/2022 12:15 AM      Liver Enzymes Recent Labs     10/17/22  0800 10/17/22  0400   TP  --  5.8*   ALB 2.9* 3.0*   AP  --  306*      Thyroid Studies Lab Results   Component Value Date/Time    TSH 2.820 07/19/2022 06:09 AM          Signed By: Winsome Keating MD     October 17, 2022

## 2022-10-17 NOTE — PROGRESS NOTES
Nutrition Note      Pt discussed during interdisciplinary rounds. Remains intubated, on mechanical ventilator. Pt was receiving TF at goal rate of 45 mL/hr; TF were then decreased back to 20 mL/hr on 10/15 by PA due to pt requiring increased vasopressor requirements (TF at 20 mL/hr providin kcal, 40 gm protein, 364 mL free water, 48% RDIs). Plan to keep tube feeds at trickle rate at this time per MD. Pt on CRRT; has fluid overload. Is anuric. Na low, 135 mmol/L. Discussed decreasing water flushes (pt currently receiving 150 mL q 6 hours). MD recommended defer to Nephrology about water flushes. Discussed with Dr Kavon Breaux; MD reported that pt does not need any additional water. This writer recommended providing minimal water flushes for feeding tube patency, 30 mL q 6 hours; MD agreed with plan. Patient's KEVIN Espino Comment unavailable; discussed plan with KEVIN Douglass, who will relay report to KEVIN Espino Comment. BG are in target range; Glycemic Control following. Per RN, pt with some brownish/ red secretion out of ET tube. BM 10/14. Nutrition goals were being met, now progress towards goal is declined. Nutrition Recommendations/Plan:   Monitor pt status and readiness for advancement of tube feeds of Glucerna 1.5 back towards goal rate of 45 mL/hr.                      (Goal EN provides:  1620 kcal, 89 gm protein, 820 mL free water, 100% RDIs)    Modify water flushes; decrease to 30 mL q 6 hours (minimal flushes for feeding tube patency). Consider modifying tube feeds, changing to a higher calorie formula (TwoCal HN) per mL fluid compared to Glucerna 1.5, with trickle tube feeds and monitor pt readiness to be be advanced to goal rate of 35 mL/hr with water flushes of 30 mL q 6 hours and daily liquid MVI.     (TwoCal HN at 35 mL/hr provides:  1680 kcal, 70 gm protein, 588 mL free water, 88% RDIs)          Electronically signed by Pearson Mcardle, RD on 10/17/2022 at 2:51 PM    Contact: [phone]

## 2022-10-17 NOTE — ACP (ADVANCE CARE PLANNING)
Advance Care Planning     General Advance Care Planning (ACP) Conversation  Palliative Medicine    Visited patient along with Palliative team member ANGELO Morris NP. Patient lying in bed with eyes opened. Remains intubated, on Levo, Precedex,  Milrinone and CRRT. Non-responsive to verbal or tactile stimuli at this time. Son at bedside, states he is in a hurry, needs to leave to get to a meeting. Is agreeable to meet with our team tomorrow along with his sister to discuss goals of care. Son will call tomorrow morning after 10 am with time for meeting. Patient presented to the ED 10/10/22 with shortness of breath and bilateral pedal edema. PMH: CHF, HTN, A-fib, COPD, CAD, Mild dementia. Rapid response called 10/12 for hypoxia and increased respiratory rate, requiring intubation and transfer to ICU. Pt does not have an Advance Directive on file in EMR. Goals of care to be discussed with family during scheduled meeting 10/18/22. ACP documents you currently have include:  [] Advance Directive or Living Will   Durable Do Not Resuscitate  [] Physician Orders for Scope of Treatment (POST)  [] Medical Power of   [x] Other-None    Thank you for the Palliative Medicine consult and allowing us to participate in the care of Ms. Tim Campbell. Will continue to monitor and provide support.     CODE STATUS: FULL CODE WITH FULL INTERVENTIONS    Allyson Woo RN  Palliative Medicine Inpatient RN  [facility]  Palliative COPE Line: [phone] ([phone])

## 2022-10-17 NOTE — CONSULTS
Infectious Disease Consultation Note        Reason:septic shock    Current abx Prior abx   Meropenem since 10/15  Vancomycin since 10/11 Pip/tazo 10/11-10/15     Lines:       Assessment :  78-year-old lady with past med history significant for coronary artery disease, CHF, hypertension, atrial fibrillation, CVA admitted to [facility] on 10/11/2022 for evaluation of shortness of breath. Now with intermittent fever, worsening hypotension, elevated procalcitonin, Ct abdomen/pelvis 10/14- distended gallbladder, mesenteric edema    Clinical presentation concerning for sepsis superimposed on underlying cardiogenic shock    Initial hypotension could be cardiogenic shock. However low-grade fevers on 10/14 along with worsening hypotension and increased pressor requirement on 10/14-10/15, profound lactic acidosis, significantly elevated procalcitonin & some improvement noted after modification of antibiotics on 10/15 is concerning for sepsis, partially treated infection - ? Partially treated aspiration pneumonia due to resistant gram-negative pathogens versus ischemic colitis versus acute on chronic cholecystitis  No definitive evidence of cholecystitis noted on ultrasound abdomen 10/15/2022  Patient is not currently stable for further imaging studies    Acute kidney injury-likely ischemic ATN. On CRRT. Nephrology follow-up appreciated    thrombocytopenia- ? Heparin-induced ? Sepsis    Cardiogenic shock-cardiology follow-up appreciated    Recommendations:    Continue meropenem, Eraxis for now.   Discontinue vancomycin  Follow-up Fungitell, blood cultures  Monitor LFTs, procalcitonin, renal function, clinically  Management of thrombocytopenia per primary team  Titrate pressors as tolerated  Follow-up cardiology, nephrology recommendations  Obtain EKG to assess QT interval and determine need for quinolones if required in future  Send urinary Legionella, Streptococcus pneumonia antigen if able to obtain urine specimen-patient only had 5 cc of urine output in the past 24 hours  Recommend discussion of goals of care with family since prognosis seems poor at this time    Addendum: 11:00 am  Reassessed patient. Currently on levophed, vasopressin. EKG reviewed. QTc >500. Will continue meropenem and monitor clinically      Thank you for consultation request. Above plan was discussed in details with ICU team.cc time spent > 45 min. Please call me if any further questions or concerns. Will continue to participate in the care of this patient. HPI:    57-year-old lady with past med history significant for coronary artery disease, CHF, hypertension, atrial fibrillation, CVA admitted to [facility] on 10/11/2022 for evaluation of shortness of breath. Patient was intubated at the time of my evaluation. Obtained most history from review of records, talking to the ICU team.  Patient presented to [facility] ED on 10/10/2022 with complaints of not feeling well, chest discomfort and shortness of breath. Apparently patient was in [facility] for more than 18 hours prior to being transferred to [facility]. Patient had tachycardia, tachypnea with some hypoxic episodes. Was treated with IV Lopressor. She had rapidly worsening oxygen requirement while in the emergency room. On 10/11, rapid response was called for acute respiratory distress. On 10/12 AM patient was found to be tachypneic with respiratory alkalosis. Patient was subsequently intubated and transferred to the intensive care unit. She was subsequently noted to have atrial fibrillation with rapid rate. She was initiated on Levophed, it was felt that patient had CHF exacerbation, and neck shock. Patient was initiated on Zosyn, vancomycin on 10/11. Of note patient had a temperature of 101.7 on 10/12. On 10/14 p.m. patient had temperature 100.5. Subsequently patient had worsening pressor requirement and was placed on Levophed and vasopressin overnight.   I was consulted on 10/15 for further recommendations. Patient had a noncontrast CT scan of the abdomen which revealed gallbladder dilation, mesenteric edema. LFTs, lactate. I recommended to switch patient to meropenem, vancomycin, Eraxis till further information obtained. I am seeing patient for further recommendations. Patient was noted to have some improvement in the blood pressure last evening and vasopressin was turned off. It was done back on today morning due to fluctuating blood pressure, hypotension. Patient also had hypothermia and is on warming blanket at this time. Is on CRRT. Detailed review of system not feasible      Past Medical History:   Diagnosis Date    A-fib ([facility] [address]     CAD (coronary artery disease)     Heart Failure    Chronic systolic CHF (congestive heart failure) ([facility])     LVEF 20-25% & Abnormal Diastolic Function by TTE on 6/03/2022    Hypertension     MI (myocardial infarction) ([facility]     Osteoporosis     Stroke [facility])        Past Surgical History:   Procedure Laterality Date    HX BACK SURGERY      UT CARDIAC SURG PROCEDURE UNLIST      stents placed at [facility]       Current Discharge Medication List        CONTINUE these medications which have NOT CHANGED    Details   ramipriL (ALTACE) 1.25 mg capsule Take 1 Capsule by mouth in the morning. Qty: 30 Capsule, Refills: 0      furosemide (LASIX) 40 mg tablet Take 1 Tablet by mouth two (2) times a day. Please dispense 40 mg BID 60 tablet for 30 days  Qty: 60 Tablet, Refills: 0      midodrine (PROAMATINE) 2.5 mg tablet Take 2.5 mg by mouth two (2) times a day. memantine (NAMENDA) 10 mg tablet Take 10 mg by mouth two (2) times a day. ascorbic acid, vitamin C, (VITAMIN C) 250 mg tablet Take 250 mg by mouth daily. fluticasone furoate-vilanteroL (Breo Ellipta) 100-25 mcg/dose inhaler Take 1 Puff by inhalation daily. calcium-cholecalciferol, d3, 600-125 mg-unit tab Take  by mouth.      cyanocobalamin 1,000 mcg tablet Take 1,000 mcg by mouth daily.       ferrous sulfate 325 mg (65 mg iron) tablet Take 325 mg by mouth Daily (before breakfast). atorvastatin (LIPITOR) 80 mg tablet Take 80 mg by mouth nightly. clonazePAM (KLONOPIN) 0.5 mg tablet Take 0.5 mg by mouth nightly as needed. clopidogrel (PLAVIX) 75 mg tab Take 75 mg by mouth daily. pantoprazole (PROTONIX) 40 mg tablet Take 40 mg by mouth daily. escitalopram oxalate (LEXAPRO) 5 mg tablet Take 15 mg by mouth daily. aspirin delayed-release 81 mg tablet Take  by mouth daily. metoprolol (LOPRESSOR) 100 mg tablet Take 25 mg by mouth three (3) times daily.              Current Facility-Administered Medications   Medication Dose Route Frequency    docusate sodium (COLACE) capsule 100 mg  100 mg Oral DAILY    polyethylene glycol (MIRALAX) packet 17 g  17 g Oral DAILY PRN    digoxin (LANOXIN) injection 62.5 mcg  0.0625 mg IntraVENous Q48H    [Held by provider] heparin (porcine) injection 5,000 Units  5,000 Units SubCUTAneous Q8H    vasopressin (VASOSTRICT) 20 Units in 0.9% sodium chloride 100 mL infusion  0.03 Units/min IntraVENous CONTINUOUS    anidulafungin (ERAXIS) 100 mg in 0.9% sodium chloride 130 mL IVPB  100 mg IntraVENous Q24H    meropenem (MERREM) 2 g in 0.9% sodium chloride 100 mL IVPB  2 g IntraVENous Q12H    polyvinyl alcohol-povidon(PF) (REFRESH CLASSIC) 1.4-0.6 % ophthalmic solution 1 Drop  1 Drop Both Eyes PRN    hydrocortisone Sod Succ (PF) (SOLU-CORTEF) injection 50 mg  50 mg IntraVENous Q6H    milrinone (PRIMACOR) 20 MG/100 ML D5W infusion  0.375 mcg/kg/min IntraVENous CONTINUOUS    0.9% sodium chloride infusion 250 mL  250 mL IntraVENous PRN    VANCOMYCIN INFORMATION NOTE   Other Rx Dosing/Monitoring    insulin glargine (LANTUS) injection 6 Units  6 Units SubCUTAneous DAILY    pantoprazole (PROTONIX) 40 mg in 0.9% sodium chloride 10 mL injection  40 mg IntraVENous DAILY    bicarbonate dialysis (PRISMASOL) BG K 4/Ca 2.5 5000 ml solution   Extracorporeal DIALYSIS CONTINUOUS bicarbonate dialysis (PRISMASOL) BG K 4/Ca 2.5 5000 ml solution   Extracorporeal DIALYSIS CONTINUOUS    chlorhexidine (PERIDEX) 0.12 % mouthwash 10 mL  10 mL Oral Q12H    sodium chloride (NS) flush 5-40 mL  5-40 mL IntraVENous PRN    fentaNYL (PF) 1,500 mcg/30 mL (50 mcg/mL) infusion  0-150 mcg/hr IntraVENous TITRATE    insulin lispro (HUMALOG) injection   SubCUTAneous Q6H    glucose chewable tablet 16 g  4 Tablet Oral PRN    glucagon (GLUCAGEN) injection 1 mg  1 mg IntraMUSCular PRN    dextrose 10% infusion 0-250 mL  0-250 mL IntraVENous PRN    dexmedeTOMidine in 0.9 % NaCl (PRECEDEX) 400 mcg/100 mL (4 mcg/mL) infusion soln  0.1-1.5 mcg/kg/hr IntraVENous TITRATE    fentaNYL citrate (PF) injection 100 mcg  100 mcg IntraVENous Q2H PRN    midazolam (VERSED) injection 1 mg  1 mg IntraVENous Q2H PRN    NOREPINephrine (LEVOPHED) 32,000 mcg in dextrose 5% 250 mL (128 mcg/mL) infusion  0.5-30 mcg/min IntraVENous TITRATE    aspirin delayed-release tablet 81 mg  81 mg Oral DAILY    atorvastatin (LIPITOR) tablet 80 mg  80 mg Oral QHS    [Held by provider] clopidogreL (PLAVIX) tablet 75 mg  75 mg Oral DAILY    cyanocobalamin tablet 1,000 mcg  1,000 mcg Oral DAILY    [Held by provider] escitalopram oxalate (LEXAPRO) tablet 15 mg  15 mg Oral DAILY    ferrous sulfate tablet 325 mg  325 mg Oral ACB    memantine (NAMENDA) tablet 10 mg  10 mg Oral BID    [Held by provider] metoprolol tartrate (LOPRESSOR) tablet 25 mg  25 mg Oral TID    [Held by provider] lisinopriL (PRINIVIL, ZESTRIL) tablet 5 mg  5 mg Oral DAILY    albuterol-ipratropium (DUO-NEB) 2.5 MG-0.5 MG/3 ML  3 mL Nebulization Q4H RT    acetaminophen (TYLENOL) tablet 650 mg  650 mg Oral Q6H PRN    midodrine (PROAMATINE) tablet 10 mg  10 mg Oral TID    nitroglycerin (NITROBID) 2 % ointment 1 Inch  1 Inch Topical Q6H PRN    albuterol-ipratropium (DUO-NEB) 2.5 MG-0.5 MG/3 ML  3 mL Nebulization Q6H PRN     Facility-Administered Medications Ordered in Other Encounters Medication Dose Route Frequency    etomidate (AMIDATE) 2 mg/mL injection   IntraVENous PRN    succinylcholine (ANECTINE) 20 mg/mL syringe   IntraVENous PRN       Allergies: Patient has no known allergies. No family history on file. Social History     Socioeconomic History    Marital status:      Spouse name: Not on file    Number of children: Not on file    Years of education: Not on file    Highest education level: Not on file   Occupational History    Not on file   Tobacco Use    Smoking status: Former    Smokeless tobacco: Never   Substance and Sexual Activity    Alcohol use: No    Drug use: No    Sexual activity: Not on file   Other Topics Concern    Not on file   Social History Narrative    Not on file     Social Determinants of Health     Financial Resource Strain: Not on file   Food Insecurity: Not on file   Transportation Needs: Not on file   Physical Activity: Not on file   Stress: Not on file   Social Connections: Not on file   Intimate Partner Violence: Not on file   Housing Stability: Not on file     Social History     Tobacco Use   Smoking Status Former   Smokeless Tobacco Never        Temp (24hrs), Av.1 °F (36.7 °C), Min:97.5 °F (36.4 °C), Max:98.8 °F (37.1 °C)    Visit Vitals  BP (!) 100/53   Pulse (!) 114   Temp 97.7 °F (36.5 °C)   Resp 20   Ht 5' 3\" (1.6 m)   Wt 56.5 kg (124 lb 9 oz)   SpO2 92%   Breastfeeding No   BMI 22.06 kg/m²       ROS: unable to obtain    Physical Exam:    General: elderly lady laying on bed, intubated. HEENT:  Normocephalic, atraumatic, no scleral icterus or pallor; no conjunctival hemmohage;  nasal and oral mucous are moist and without evidence of lesions. ET tube in place. Neck supple, no bruits. Lymph Nodes:   Not examined   Lungs:   non-labored, bilateral chest movements equal   Heart:  RRR, s1 and s2; no  rubs or gallops, no edema   Abdomen:  soft, non-distended, active bowel sounds, no hepatomegaly, no splenomegaly. Non-tender   Genitourinary: Val[place] in place   Extremities:   no clubbing, cyanosis; trace edema; muscle mass appropriate for age   Neurologic:  sedated                        Skin:  Small pinpoint ulcer in the perianal region with some surrounding area of superficial pressure necrosis   Back:   no CVA tenderness     Psychiatric:  No suicidal or homicidal ideations, appropriate mood and affect         Labs: Results:   Chemistry Recent Labs     10/17/22  0400 10/17/22  0010 10/16/22  1917 10/16/22  1024 10/16/22  0605 10/15/22  1815 10/15/22  1048   * 151* 155*   < > 159*   < > 174*   * 135* 134*   < > 135*   < > 135*   K 4.5 4.5 4.9   < > 4.8   < > 4.7    103 103   < > 104   < > 104   CO2 26 24 24   < > 22   < > 23   BUN 19* 19* 18   < > 16   < > 20*   CREA 1.18 1.23 1.29   < > 1.25   < > 1.35*   CA 8.4* 8.4* 8.2*   < > 8.2*   < > 8.1*   AGAP 5 8 7   < > 9   < > 8   BUCR 16 15 14   < > 13   < > 15   *  --   --   --  306*  --  134*   TP 5.8*  --   --   --  6.1*  --  6.1*   ALB 3.0* 3.0* 3.1*   < > 3.2*   < > 2.8*   GLOB 2.8  --   --   --  2.9  --  3.3   AGRAT 1.1  --   --   --  1.1  --  0.8    < > = values in this interval not displayed. CBC w/Diff Recent Labs     10/17/22  0400 10/16/22  0605 10/15/22  1815 10/15/22  0204   WBC 13.9* 13.1  --  13.7*   RBC 2.31* 2.46*  --  2.26*   HGB 7.2* 7.7* 6.3* 7.3*   HCT 21.5* 23.3* 19.4* 23.5*   PLT 41* 55*  --  106*   GRANS 90* 91*  --  90*   LYMPH 3* 3*  --  4*   EOS 0 0  --  0      Microbiology Recent Labs     10/15/22  2114 10/14/22  2226   CULT NO GROWTH AFTER 19 HOURS NO GROWTH 2 DAYS  NO GROWTH 2 DAYS          RADIOLOGY:    All available imaging studies/reports in [facility] for this admission were reviewed      Disclaimer: Sections of this note are dictated utilizing voice recognition software, which may have resulted in some phonetic based errors in grammar and contents.  Even though attempts were made to correct all the mistakes, some may have been missed, and remained in the body of the document. If questions arise, please contact our department.     Dr. Vincenzo Zhang, Infectious Disease Specialist  [phone]  October 17, 2022  8:18 AM

## 2022-10-17 NOTE — ROUTINE PROCESS
Bedside and Verbal shift change report given to [address] (oncoming nurse) by Maira Casanova RN (offgoing nurse). Report included the following information SBAR, Kardex, Intake/Output, MAR, Recent Results, Cardiac Rhythm  , and Alarm Parameters .

## 2022-10-17 NOTE — PROGRESS NOTES
attended the interdisciplinary rounds for Miguel Musa, who is a 78 y.o.,female. Patients Primary Language is: [place]. According to the patients EMR Voodoo Affiliation is: Worship. The reason the Patient came to the hospital is:   Patient Active Problem List    Diagnosis Date Noted    UTI (urinary tract infection) 10/11/2022    Pneumonia 10/10/2022    Carotid artery disease ([facility] [address] 09/29/2022    Fluid overload 07/18/2022    Acute on chronic systolic and diastolic heart failure, NYHA class 4 ([facility] [address] 07/17/2022    CHF (congestive heart failure) ([facility] [address] 06/02/2022      Plan:  Chaplains will continue to follow and will provide pastoral care on an as needed/requested basis.  recommends bedside caregivers page  on duty if patient shows signs of acute spiritual or emotional distress.     [address] [facility]  Board Certified [facility]   [phone]

## 2022-10-17 NOTE — PROGRESS NOTES
Problem: Falls - Risk of  Goal: *Absence of Falls  Description: Document Ksenia Ground Fall Risk and appropriate interventions in the flowsheet. Outcome: Progressing Towards Goal  Note: Fall Risk Interventions:       Mentation Interventions: Toileting rounds    Medication Interventions: Assess postural VS orthostatic hypotension    Elimination Interventions: Toileting schedule/hourly rounds              Problem: Pressure Injury - Risk of  Goal: *Prevention of pressure injury  Description: Document Sushant Scale and appropriate interventions in the flowsheet.   Outcome: Progressing Towards Goal  Note: Pressure Injury Interventions:  Sensory Interventions: Sit a 90-degree angle/use footstool if needed, Avoid rigorous massage over bony prominences, Float heels, Pressure redistribution bed/mattress (bed type), Use 30-degree side-lying position    Moisture Interventions: Check for incontinence Q2 hours and as needed    Activity Interventions: Pressure redistribution bed/mattress(bed type)    Mobility Interventions: HOB 30 degrees or less, Float heels, Pressure redistribution bed/mattress (bed type)    Nutrition Interventions: Document food/fluid/supplement intake    Friction and Shear Interventions: Lift sheet, HOB 30 degrees or less                Problem: Ventilator Management  Goal: *Adequate oxygenation and ventilation  Outcome: Progressing Towards Goal  Goal: *Patient maintains clear airway/free of aspiration  Outcome: Progressing Towards Goal  Goal: *Absence of infection signs and symptoms  Outcome: Progressing Towards Goal

## 2022-10-17 NOTE — PROGRESS NOTES
[facility]. Pulmonary, Critical Care, and Sleep Medicine    Name: Yajaira Ruth MRN: [id number]   : 1940 Hospital: [facility]   Date: 10/17/2022  Admission Date: 10/10/2022     Chart and notes reviewed. Data reviewed. I have evaluated all findings. [x]I have reviewed the flowsheet and previous days notes. [x]The patient is unable to give any meaningful history or review of systems because the patient is:  [x]Intubated []Sedated   []Unresponsive      [x]The patient is critically ill on      [x]Mechanical ventilation [x]Pressors   []BiPAP []         Interval HPI:    Patient is a 78 y.o. female with PMH MI, CVA, systolic and diastolic CHF, COPD, A-fib, CAD admitted to [facility] on 10/10/22 via EMS c/o SOB and pedal edema. She was found to be hypoxic in CHF exacerbation and dx of CAP, started on ABX and admitted. She unfortunately was getting half of her home dose of lasix orally despite her CHF exacerbation with BNP greater than 20k. Per chart review, on 10/11 rapid response was called for acute respiratory distress with O2 sats 70s to low 80s, on NC and NRB. With no improvement in oxygenation she was switched to HFNC then BIPAP. The morning of 10/12 she was found to be tachypneic with  respiratory alkalosis and worsening SpO2. She was intubated and transferred to ICU. CXR consistent with pulmonary edema. She was also in Afib RVR and was on a diltiazem drip. This was stopped upon arrival to the ICU as her rate was well controlled at that time and she was becoming hypotensive. Started on levophed and central line placed on 10/12. Afib rhythm has resumed with rates staying in the 120s-130s despite amiodarone drip.      CT obtained 10/14/22 demonstrates infectious v inflammatory process in bilateral lower lungs, possible acalculous cholecystitis v edema secondary to volume overload, sigmoid diverticulosis and mesenteric edema    10/16 - On CRRT net even, requiring pressors for HD stability. Subjective 10/17/22  Hospital Day: 10/11  Vent Day: 10/12  Overnight events: Remains on CRRT, pressors requirements increasing. Pt became hypoxic, hypotensive and tachypneic on sedation holiday this AM.   Mentation/Activity: intubated, sedated  Respiratory/ Secretions: Increasing fio2 requirements. Hemodynamics: Remains on 10 levophed, midodrine, milrinone  Urine output, bowel: Minimal UOP  Diet: Tube feeds  Need for procedures:              ROS:Review of systems not obtained due to patient factors. Events and notes from last 24 hours reviewed.      Patient Active Problem List   Diagnosis Code    CHF (congestive heart failure) ([facility]) I50.9    Acute on chronic systolic and diastolic heart failure, NYHA class 4 ([facility]) I50.43    Fluid overload E87.70    Carotid artery disease ([facility]) I77.9    Pneumonia J18.9    UTI (urinary tract infection) N39.0       Vital Signs:  Visit Vitals  BP (!) 113/53   Pulse (!) 126   Temp 98.7 °F (37.1 °C)   Resp 11   Ht 5' 3\" (1.6 m)   Wt 56.5 kg (124 lb 9 oz)   SpO2 96%   Breastfeeding No   BMI 22.06 kg/m²       O2 Device: Endotracheal tube   O2 Flow Rate (L/min): 50 l/min   Temp (24hrs), Av.9 °F (36.6 °C), Min:97.5 °F (36.4 °C), Max:98.7 °F (37.1 °C)       Intake/Output:   Last shift:      10/17 0701 - 10/17 1900  In: 965.2 [I.V.:485.2]  Out: 940   Last 3 shifts: 10/15 1901 - 10/17 0700  In: 5335.5 [I.V.:2820.5]  Out: 9107     Intake/Output Summary (Last 24 hours) at 10/17/2022 1506  Last data filed at 10/17/2022 1400  Gross per 24 hour   Intake 2924.72 ml   Output 2888 ml   Net 36.72 ml            Current Facility-Administered Medications   Medication Dose Route Frequency    aspirin chewable tablet 81 mg  81 mg Per G Tube DAILY    docusate (COLACE) 50 mg/5 mL oral liquid 100 mg  100 mg Per G Tube DAILY    hydrocortisone Sod Succ (PF) (SOLU-CORTEF) injection 25 mg  25 mg IntraVENous Q6H    digoxin (LANOXIN) injection 62.5 mcg  0.0625 mg IntraVENous Q48H    [Held by provider] heparin (porcine) injection 5,000 Units  5,000 Units SubCUTAneous Q8H    vasopressin (VASOSTRICT) 20 Units in 0.9% sodium chloride 100 mL infusion  0.03 Units/min IntraVENous CONTINUOUS    anidulafungin (ERAXIS) 100 mg in 0.9% sodium chloride 130 mL IVPB  100 mg IntraVENous Q24H    meropenem (MERREM) 2 g in 0.9% sodium chloride 100 mL IVPB  2 g IntraVENous Q12H    milrinone (PRIMACOR) 20 MG/100 ML D5W infusion  0.375 mcg/kg/min IntraVENous CONTINUOUS    insulin glargine (LANTUS) injection 6 Units  6 Units SubCUTAneous DAILY    pantoprazole (PROTONIX) 40 mg in 0.9% sodium chloride 10 mL injection  40 mg IntraVENous DAILY    bicarbonate dialysis (PRISMASOL) BG K 4/Ca 2.5 5000 ml solution   Extracorporeal DIALYSIS CONTINUOUS    bicarbonate dialysis (PRISMASOL) BG K 4/Ca 2.5 5000 ml solution   Extracorporeal DIALYSIS CONTINUOUS    chlorhexidine (PERIDEX) 0.12 % mouthwash 10 mL  10 mL Oral Q12H    fentaNYL (PF) 1,500 mcg/30 mL (50 mcg/mL) infusion  0-150 mcg/hr IntraVENous TITRATE    insulin lispro (HUMALOG) injection   SubCUTAneous Q6H    dexmedeTOMidine in 0.9 % NaCl (PRECEDEX) 400 mcg/100 mL (4 mcg/mL) infusion soln  0.1-1.5 mcg/kg/hr IntraVENous TITRATE    NOREPINephrine (LEVOPHED) 32,000 mcg in dextrose 5% 250 mL (128 mcg/mL) infusion  0.5-30 mcg/min IntraVENous TITRATE    atorvastatin (LIPITOR) tablet 80 mg  80 mg Oral QHS    [Held by provider] clopidogreL (PLAVIX) tablet 75 mg  75 mg Oral DAILY    cyanocobalamin tablet 1,000 mcg  1,000 mcg Oral DAILY    [Held by provider] escitalopram oxalate (LEXAPRO) tablet 15 mg  15 mg Oral DAILY    ferrous sulfate tablet 325 mg  325 mg Oral ACB    memantine (NAMENDA) tablet 10 mg  10 mg Oral BID    [Held by provider] metoprolol tartrate (LOPRESSOR) tablet 25 mg  25 mg Oral TID    [Held by provider] lisinopriL (PRINIVIL, ZESTRIL) tablet 5 mg  5 mg Oral DAILY    albuterol-ipratropium (DUO-NEB) 2.5 MG-0.5 MG/3 ML  3 mL Nebulization Q4H RT    midodrine (PROAMATINE) tablet 10 mg  10 mg Oral TID         Telemetry: []Sinus []A-flutter []Paced    [x]A-fib []Multiple PVCs                  Physical Exam:      General: Intubated/sedated; HEENT:  Anicteric sclerae; pink palpebral conjunctivae; mucosa moist  Resp:  Symmetrical chest expansion, no accessory muscle use; rales yamil lung fields  CV: irregularly irregular   GI:  Abdomen soft, non-tender;  active bowel sounds  Extremities:  +2 pulses on all extremities; 2+ pitting edema LEs  Skin:  Warm; no rashes/ lesions noted, normal turgor/cap refill   Neurologic:  Non-focal  Devices:  OGT, Central line ETT      DATA:  MAR reviewed and pertinent medications noted or modified as needed    Labs:  Recent Labs     10/17/22  0400 10/16/22  0605 10/15/22  1815 10/15/22  0204   WBC 13.9* 13.1  --  13.7*   HGB 7.2* 7.7* 6.3* 7.3*   HCT 21.5* 23.3* 19.4* 23.5*   PLT 41* 55*  --  106*       Recent Labs     10/17/22  1300 10/17/22  0800 10/17/22  0400 10/16/22  1024 10/16/22  0605 10/15/22  1815 10/15/22  1310 10/15/22  1048   * 135* 134*   < > 135*   < >  --  135*   K 4.4 4.3 4.5   < > 4.8   < >  --  4.7    105 103   < > 104   < >  --  104   CO2 20* 24 26   < > 22   < >  --  23   * 133* 140*   < > 159*   < >  --  174*   BUN 19* 19* 19*   < > 16   < >  --  20*   CREA 1.14 1.16 1.18   < > 1.25   < >  --  1.35*   CA 7.4* 8.0* 8.4*   < > 8.2*   < >  --  8.1*   MG 2.4 2.6 2.6   < > 2.5   < >  --  2.4   PHOS 2.2* 2.3* 1.9*   < > 1.8*   < >  --  2.6   ALB 2.9* 2.9* 3.0*   < > 3.2*   < >  --  2.8*   ALT  --   --  249*  --  287*  --   --  251*   INR  --   --  1.2  --  1.2  --  1.2  --     < > = values in this interval not displayed. No results for input(s): PH, PCO2, PO2, HCO3, FIO2 in the last 72 hours.   Recent Labs     10/17/22  1302 10/17/22  0547 10/16/22  0307   FIO2I 70 50 50   HCO3I 20.7* 22.2 23.0   PCO2I 30.0* 32.7* 40.6   PHI 7.45 7.44 7.36   PO2I 65* 74* 82         Imaging:  [x]   I have personally reviewed the patients radiographs and reports  XR Results (most recent):  XR Results (most recent):  Results from Hospital Encounter encounter on 10/10/22    XR CHEST PORT    Narrative  Portable CXR:    HISTORY: Intubation. COMPARISON: October 16, 2022    ET tube tip 3 cm above the henna. NG tube tip in the gastric fundus. Right IJ  catheter tip at distal SVC. Slightly worse pulmonary edema. Stable small right  pleural effusion. No pneumothorax. Impression  As described     CT Results (most recent):  Results from Hospital Encounter encounter on 10/10/22    CT CHEST ABD PELV WO CONT    Narrative  CT CHEST ABDOMEN PELVIS UNENHANCED    CPT code: 44917, 57205 & 61625    INDICATION: Sepsis. TECHNIQUE: 5 mm collimation axial images obtained from the thoracic inlet to the  level of the pubic symphysis without intravenous or oral contrast..    All CT scans at this facility are performed using dose optimization technique as  appropriate to this specific exam, to include automated exposure control,  adjustment of the mA and/or KP according to patient size or use of iterative  reconstruction techniques. COMPARISON: Chest x-ray earlier same day    CHEST FINDINGS:  Lack of intravenous contrast renders this study suboptimal for evaluating  mediastinal structures. Endotracheal tube in place, tip terminating in the mid thoracic trachea. Nasogastric tube in place, tip terminating in the lateral body of the  nondistended stomach. Right jugular central line in place, tip to the distal SVC. Heart size top normal. There is a tiny pericardial effusion diffusely versus  smooth pericardial thickening. Diffuse bilateral coronary artery calcifications, left greater than right. No enlarged axillary lymph nodes. Borderline enlarged left anterior mediastinal  lymph nodes and precarinal nodes. Lung windows demonstrate no pneumothorax.  Geographic areas of subsegmental  groundglass attenuation type changes present in the bilateral upper lobes and  more extensively in the lingular segment. More extensive groundglass to partially confluent airspace opacities involving  all segments of the bilateral lower lobes with central air bronchograms. Findings suggest acute infectious or inflammatory process. Trace left and small right pleural effusions. .    ABDOMEN FINDINGS:  Lack of intravenous contrast renders this study suboptimal for evaluating the  vasculature and solid abdominal organs. Liver is nonenlarged. Several scattered calcification/granuloma in the right  lobe. Possible 8mm hypodensity in the dome of the right lobe, largely obscured  by streak artifact from spine. Gallbladder is borderline distended measuring up to 4.7 cm diameter. No  intraluminal stones are identified. Possible mild amount of adjacent stranding  in the pericholecystic fat. Pancreas severely motion degraded and not well assessed at all. Spleen nonenlarged. Mild thickening of the body of the left adrenal gland up to 12 mm. Right  unremarkable. Kidneys appear globally at least mildly atrophic. No hydronephrosis. Nonobstructing 4 mm lateral inferior right parenchymal calcification.  -Cannot exclude 1.8 cm low-attenuation lesion anterior mid left renal cortex on  image 21 series 3. There appears to be some adjacent scarring. Could simply be  normal parenchyma but mass not excluded given attenuation is not cystic. Abdominal aorta nonaneurysmal. Diffuse atherosclerotic wall calcification, to  involve the origin of the visceral vessels and renal arteries. Right femoral central line in place, extending to the infrarenal IVC. PELVIS FINDINGS:  Valverde catheter in place decompressing the urinary bladder. No small bowel distention to suggest obstruction. Mildly distended descending colon with mixture of fecal material and fluid. Sigmoid diverticulosis. No definite acute inflammatory change.  Cannot well  assess for colitis or ischemia or similar due to lack of contrast and  distention. Uterus not enlarged. Mild diffuse mesenteric edema. No overt ascites. Diffuse subcutaneous edema. Treated compression deformities of L2 and T8-T12. Mild superior endplate  compression deformity L1 and minimally L4 age indeterminate. At least mild  compression deformity T4 age indeterminate. Impression  1. Bilateral lower lobe multi segmental groundglass to partially consolidative  airspace opacities. Favor acute infectious or inflammatory process. Not densely  consolidative such as would be expected for aspiration. 2. Patchy groundglass airspace opacities bilateral upper lobes could reflect  component of edema. Trace left and small right effusions. 3. Very small diffuse pericardial effusion versus smooth pleural thickening. 4. Bilateral renal atrophy. No hydronephrosis. Nonobstructing small right renal  calcification.  -Suspect lobulation anterior left kidney versus less likely mass. Would consider  nonemergent dedicated renal ultrasound when clinically able for clarification. 5. Borderline gallbladder dilation without cholelithiasis. Minimal regional  mesenteric stranding. Unclear if this could be related to inflammation from  acalculus cholecystitis or could be related to the underlying body wall edema. Could correlate with ultrasound. 6. Sigmoid diverticulosis without any convincing evidence for acute  inflammation. No small bowel distention. 7. Mesenteric edema. Diffuse subcutaneous edema. 8. Multiple lumbar and thoracic compression deformities, several interval  treated with vertebroplasty. Several are not and are age indeterminant. 10/10/22    ECHO ADULT FOLLOW-UP OR LIMITED 10/12/2022 10/12/2022    Interpretation Summary    Left Ventricle: Severely reduced left ventricular systolic function with a visually estimated EF of 20 - 25%. Left ventricle size is normal. Mildly increased wall thickness. Severe global hypokinesis present.     Right Ventricle: Reduced systolic function. Aortic Valve: Mild regurgitation. Mitral Valve: Mild regurgitation. Left Atrium: Left atrium is dilated. Right Atrium: Right atrium is severely dilated. Pulmonary Arteries: Moderate pulmonary hypertension present. The estimated PASP is 53 mmHg. Signed by: Sebas Chou MD on 10/12/2022  4:16 PM       IMPRESSION:   Acute hypoxic respiratory failure requiring mechanical ventilation  CHF exacerbation - combined systolic and diastolic dysfunction, last EF  6/2022 was 20-25%  - Biventricular failure noted on Echo  Shock  - likely cardiogenic primarily, +/- sepsis  Severe hypervolemia requiring CRRT  Acute pulmonary edema  SIRS:  no current source of sepsis  Leukocytosis   Lactic acidosis  COPD  CAD - stents placed 6/2021  A-fib with RVR  Macrocytic anemia   Hyperlipidemia  UTI  H/o MI  H/o CVA - aphasia  H/o GERD  H/o anxiety     Patient Active Problem List   Diagnosis Code    CHF (congestive heart failure) ([facility]) I50.9    Acute on chronic systolic and diastolic heart failure, NYHA class 4 ([facility]) I50.43    Fluid overload E87.70    Carotid artery disease ([facility]) I77.9    Pneumonia J18.9    UTI (urinary tract infection) N39.0        RECOMMENDATIONS:   Neuro: Titrate sedation to RASS of 0 to -1. PRN for breakthrough sedation needs. H/o aphasia following CVA. Currently on precedex gtt and fentanyl gtt. Pulm: Titrate FiO2 for goal SPO2> 90%,VAP prevention bundle. Daily sedation holiday and assessment for weaning with SBT as tolerated. PRN duonebs. Aspiration precautions, Keep HOB >30 degrees. Pulmonary edema 2/2 hypervolemia. Worsening hypoxia, tachypneic with sedation holiday. CXR with pulmonary edema. CVS : Continuous cardiac monitoring, titrate levophed to maintain MAP >65mmHg. Echo 10/12 showed EF 20-25% (similar to 6/22). Heparin gtt for A-fib RVR. On milrinone and levophed gtt. GI: tube feeds     Renal: Strict I/Os.  ELY vs ATN, ?cardiorenal syndrome. CRRT with with net even. Hem/Onc: Monitor for s/o active bleeding. Hold heparin, decreased Hgb requring transfusion of 1 unit of PRBCs. Plts decreasing, heparin gtt held. LE dopplers pending. HIT panel ordered, but likely 2/2 CRRT. I/D:  persistent leukocytosis. Normothermic now. Repeat blood cx 10/14, CT 10/14. On Eraxis and merrem per ID. Endocrine: Q6 glucoses, SSI. Metabolic:  Daily BMP, mag, phos. Trend lytes, replace as needed. Calcium replaced. Musc/Skin: no acute issues, wound care     Full Code  Discussed during interdisciplinary rounds. Palliative care consult today. Overall very poor prognosis. Best practice :    Glycemic control  IHI ICU bundles:   Central Line Bundle Followed , Villeda Bundle Followed, and Vent Bundle Followed, Vent Day 10/12     [facility] Vent patients-    VAP bundle-[place] tube to suction at 20-30 cm Hg, Maintain [place] tube with 5-10ml air every 4 hours, Routine oral care every 4 hours, Elevation of head > 45 degree, Daily sedation holiday and SBT evaluation starting at 6.00am. and [facility] network Guidelines: Lung protective strategy and Plateau  Pressure goal < 30 cm H2O goals, Oxygenation Goals PaO2 55-80 mm Hg or SaO2 88-95%, PH goal 7.30-7.45  Stress ulcer prophylaxis. Protonix  DVT prophylaxis. Not indicated anemia  Need for Lines, villeda assessed. Palliative care evaluation. Restraints not need. 55 min CC time was spent on this patient. This care involved high complexity decision making to assess, manipulate, and support vital system functions, to treat this degreee vital organ system failure and to prevent further life threatening deterioration of the patients condition  The services I provided to this patient were to treat and/or prevent clinically significant deterioration that could result in the failure of one or more body systems and/or organ systems due to respiratory distress, hypoxia, cardiac dysrhythmia.        Hazel Reveles MD 10/17/22  Pulmonary, Critical Care Medicine  [facility]

## 2022-10-17 NOTE — INTERDISCIPLINARY ROUNDS
[facility]  Pulmonary, Critical Care, and Sleep Medicine  Interdisciplinary and Ventilator Weaning Rounds    Patient discussed in morning walking rounds and interdisciplinary rounds. ICU day: 10/12    Overnight events:   CRRT overnight, continuing     Assessments and best practice:  Ventilator  Ventilator day 10/12  Vent settings: FiO2 of 50 and PEEP of 10  VAP bundle, aim to keep peak plateau pressure 66-68FT H2O  Weaning assessed and documented   Patient does not meet criteria for SBT. Patient is not on sedation holiday. Plan to wean with PS N/A. Sedation  Precedex and Fentanyl   Other pertinent drips  Milrinone, Levophed  Lines noted  Villeda Catheter, RIJ central line, R fem temp dialysis cath, PIV, arterial line left radial, OGT   Critical labs assessed  Yes  Antibiotics  Merrem   Medications reviewed  Yes  Pending imaging  none  Pending send out labs  Yes  Pending Procedures  None   Glycemic control  Stress ulcer prophylaxis. Protonix  DVT prophylaxis. N/a  Need for Lines, villeda assessed.   Yes  Restraint Reevaluation   Restraints not needed    Family contact/MPOA: children, Marianna Corona and Maryam  Family updated yesterday by providers    Palliative consult within 3 days of admission to ICU-  Ethics Guidance: 21 days    Daily Plans:  Continue CRRT per nephrology , pull net even   Check EKG for Qtc prolongation, may expand abx coverage   Tachypnea and desat with decrease in sedation   Increase FIO2 to 60% from 50% due to desat   Palliative care consult   PVL's BUE/BLE  Wean steroids     HAYDEN time 15 minutes    Raynald Najjar, NP  10/17/22  Pulmonary, [address] [facility]

## 2022-10-17 NOTE — PROGRESS NOTES
Problem: Falls - Risk of  Goal: *Absence of Falls  Description: Document Ilana Villegas Fall Risk and appropriate interventions in the flowsheet. Outcome: Progressing Towards Goal  Note: Fall Risk Interventions:       Mentation Interventions: Adequate sleep, hydration, pain control, Door open when patient unattended    Medication Interventions: Evaluate medications/consider consulting pharmacy    Elimination Interventions: Toileting schedule/hourly rounds              Problem: Patient Education: Go to Patient Education Activity  Goal: Patient/Family Education  Outcome: Progressing Towards Goal     Problem: Pressure Injury - Risk of  Goal: *Prevention of pressure injury  Description: Document Sushant Scale and appropriate interventions in the flowsheet.   Outcome: Progressing Towards Goal  Note: Pressure Injury Interventions:  Sensory Interventions: Avoid rigorous massage over bony prominences, Minimize linen layers, Monitor skin under medical devices    Moisture Interventions: Apply protective barrier, creams and emollients, Absorbent underpads, Check for incontinence Q2 hours and as needed    Activity Interventions: Pressure redistribution bed/mattress(bed type)    Mobility Interventions: HOB 30 degrees or less, Pressure redistribution bed/mattress (bed type)    Nutrition Interventions: Document food/fluid/supplement intake    Friction and Shear Interventions: Lift team/patient mobility team, Minimize layers, Foam dressings/transparent film/skin sealants, HOB 30 degrees or less                Problem: Patient Education: Go to Patient Education Activity  Goal: Patient/Family Education  Outcome: Progressing Towards Goal     Problem: Ventilator Management  Goal: *Adequate oxygenation and ventilation  Outcome: Progressing Towards Goal  Goal: *Patient maintains clear airway/free of aspiration  Outcome: Progressing Towards Goal  Goal: *Absence of infection signs and symptoms  Outcome: Progressing Towards Goal  Goal: *Normal spontaneous ventilation  Outcome: Progressing Towards Goal     Problem: Patient Education: Go to Patient Education Activity  Goal: Patient/Family Education  Outcome: Progressing Towards Goal     Problem: Nutrition Deficit  Goal: *Optimize nutritional status  Outcome: Progressing Towards Goal     Problem: Injury - Risk of, Adverse Drug Event  Goal: *Absence of adverse drug events  Outcome: Progressing Towards Goal  Goal: *Absence of medication errors  Outcome: Progressing Towards Goal  Goal: *Knowledge of prescribed medications  Outcome: Progressing Towards Goal     Problem: Patient Education: Go to Patient Education Activity  Goal: Patient/Family Education  Outcome: Progressing Towards Goal     Problem: Pain  Goal: *Control of Pain  Outcome: Progressing Towards Goal  Goal: *PALLIATIVE CARE:  Alleviation of Pain  Outcome: Progressing Towards Goal     Problem: Patient Education: Go to Patient Education Activity  Goal: Patient/Family Education  Outcome: Progressing Towards Goal     Problem: Non-Violent Restraints  Goal: Removal from restraints as soon as assessed to be safe  Outcome: Progressing Towards Goal  Goal: No harm/injury to patient while restraints in use  Outcome: Progressing Towards Goal  Goal: Patient's dignity will be maintained  Outcome: Progressing Towards Goal  Goal: Patient Interventions  Outcome: Progressing Towards Goal

## 2022-10-17 NOTE — PROGRESS NOTES
0900: Notified Dr Yoshi Aguiar that pressor requirements have increased (levo up to 14 from 8 and had to restart vaso drip). Per his order, we will pull net even for the CRRT. Also notified Dr Yaneth Carlisle regarding increasing pressor requirements. Attempted sedation vacation this morning, patient became tachypneic and desated to low 80s. She did wake up enough to open eyes and localize with all four extremities. Placed back on original settings for sedation per md orders. 1300: notified letitia valdovinos of continuing increase in pressors, fiO2 on the vent, and slight increase in lactic.   1800: notified dr Devi Wilson and letitia valdovinos of pressors now Levo @21 and vaso @. 03. Lactic is rising. Providers aware.  Report given to saqib Cronin

## 2022-10-17 NOTE — PROGRESS NOTES
RENAL PROGRESS NOTE        Buster Raw         Assessment/Plan:     ELY (ischemic atn in a setting of the cardiogenic/septic shocks). Anuric. Continue crrt at a current regimen. Electrolytes are well controlled. Unable to pull fluid given increased pressor requiremehts, at this time just keep even given. ADHFrEF/cardiogenic shock/volume overload. Continue pressors. Septic shock. Source? On empiric abx. Res failure. Anemia/thrombocytopenia. Mgmt per primary team.                                                                                                                      Subjective:  Patient complaints off: intubated, sedated. On pressors.        Patient Active Problem List   Diagnosis Code    CHF (congestive heart failure) ([facility]) I50.9    Acute on chronic systolic and diastolic heart failure, NYHA class 4 ([facility]) I50.43    Fluid overload E87.70    Carotid artery disease ([facility]) I77.9    Pneumonia J18.9    UTI (urinary tract infection) N39.0       Current Facility-Administered Medications   Medication Dose Route Frequency Provider Last Rate Last Admin    aspirin chewable tablet 81 mg  81 mg Per G Tube DAILY Nadir Franklin T, DO        docusate (COLACE) 50 mg/5 mL oral liquid 100 mg  100 mg Per G Tube DAILY Nadir Franklin T, DO        polyethylene glycol (MIRALAX) packet 17 g  17 g Oral DAILY PRN Nadir Franklin, DO   17 g at 10/16/22 2120    digoxin (LANOXIN) injection 62.5 mcg  0.0625 mg IntraVENous Q48H Dennis Pacheco MD   62.5 mcg at 10/16/22 1636    [Held by provider] heparin (porcine) injection 5,000 Units  5,000 Units SubCUTAneous Q8H Dio Vera PA        vasopressin (VASOSTRICT) 20 Units in 0.9% sodium chloride 100 mL infusion  0.03 Units/min IntraVENous CONTINUOUS Nadir Franklin DO 9 mL/hr at 10/17/22 0912 0.03 Units/min at 10/17/22 0912    anidulafungin (ERAXIS) 100 mg in 0.9% sodium chloride 130 mL IVPB  100 mg IntraVENous Q24H Vladimir Garcia MD 83 mL/hr at 10/16/22 1329 100 mg at 10/16/22 1329    meropenem (MERREM) 2 g in 0.9% sodium chloride 100 mL IVPB  2 g IntraVENous Q12H Yolis GOMEZ MD 33.3 mL/hr at 10/16/22 2207 2 g at 10/16/22 2207    polyvinyl alcohol-povidon(PF) (REFRESH CLASSIC) 1.4-0.6 % ophthalmic solution 1 Drop  1 Drop Both Eyes PRN Susannah Vera PA   1 Drop at 10/17/22 0240    hydrocortisone Sod Succ (PF) (SOLU-CORTEF) injection 50 mg  50 mg IntraVENous Q6H Susannah Vera PA   50 mg at 10/17/22 0623    milrinone (PRIMACOR) 20 MG/100 ML D5W infusion  0.375 mcg/kg/min IntraVENous CONTINUOUS Lo Rivera MD 6.3 mL/hr at 10/16/22 1959 0.375 mcg/kg/min at 10/16/22 1959    0.9% sodium chloride infusion 250 mL  250 mL IntraVENous PRN Roseanne Gonzalez PA-C        insulin glargine (LANTUS) injection 6 Units  6 Units SubCUTAneous DAILY Jeff Zhong PA-C   6 Units at 10/17/22 0824    pantoprazole (PROTONIX) 40 mg in 0.9% sodium chloride 10 mL injection  40 mg IntraVENous DAILY Brian DUNHAM NP   40 mg at 10/17/22 8731    bicarbonate dialysis (PRISMASOL) BG K 4/Ca 2.5 5000 ml solution   Extracorporeal DIALYSIS CONTINUOUS Carolina Garvin  mL/hr at 10/17/22 0653 New Bag at 10/17/22 0653    bicarbonate dialysis (PRISMASOL) BG K 4/Ca 2.5 5000 ml solution   Extracorporeal DIALYSIS CONTINUOUS Carolina Garvin MD 1,000 mL/hr at 10/17/22 0728 New Bag at 10/17/22 0728    chlorhexidine (PERIDEX) 0.12 % mouthwash 10 mL  10 mL Oral Q12H Farzana Sullivan PA-C   10 mL at 10/17/22 5726    sodium chloride (NS) flush 5-40 mL  5-40 mL IntraVENous PRN Farzana Sullivan PA-C        fentaNYL (PF) 1,500 mcg/30 mL (50 mcg/mL) infusion  0-150 mcg/hr IntraVENous TITRATE Nadir Franklin T,  2 mL/hr at 10/17/22 0923 100 mcg/hr at 10/17/22 0923    insulin lispro (HUMALOG) injection   SubCUTAneous Q6H Farzana Sullivan PA-C   2 Units at 10/17/22 0605    glucose chewable tablet 16 g  4 Tablet Oral PRN Farzana Sullivan PA-C        glucagon (GLUCAGEN) injection 1 mg  1 mg IntraMUSCular PRN Rosa Brooks PA-C        dextrose 10% infusion 0-250 mL  0-250 mL IntraVENous PRN Farzana Sullivan PA-C        dexmedeTOMidine in 0.9 % NaCl (PRECEDEX) 400 mcg/100 mL (4 mcg/mL) infusion soln  0.1-1.5 mcg/kg/hr IntraVENous TITRATE Nilsa Patterson MD 16.9 mL/hr at 10/17/22 0822 1.2 mcg/kg/hr at 10/17/22 1748    fentaNYL citrate (PF) injection 100 mcg  100 mcg IntraVENous Q2H PRN Farzana Sullivan PA-C        midazolam (VERSED) injection 1 mg  1 mg IntraVENous Q2H PRN Farzana Sullivan PA-C   1 mg at 10/17/22 0228    NOREPINephrine (LEVOPHED) 32,000 mcg in dextrose 5% 250 mL (128 mcg/mL) infusion  0.5-30 mcg/min IntraVENous TITRATE Neeses Janelle DUNHAM NP 6.1 mL/hr at 10/17/22 0849 13 mcg/min at 10/17/22 0849    atorvastatin (LIPITOR) tablet 80 mg  80 mg Oral QHS Marva Barber MD   80 mg at 10/16/22 2120    [Held by provider] clopidogreL (PLAVIX) tablet 75 mg  75 mg Oral DAILY Neil Dallas MD        cyanocobalamin tablet 1,000 mcg  1,000 mcg Oral DAILY Marva Barber MD   1,000 mcg at 10/17/22 3201    [Held by provider] escitalopram oxalate (LEXAPRO) tablet 15 mg  15 mg Oral DAILY Neil Dallas MD   15 mg at 10/12/22 1155    ferrous sulfate tablet 325 mg  325 mg Oral ACB Neil Dallas MD   325 mg at 10/17/22 0824    memantine (NAMENDA) tablet 10 mg  10 mg Oral BID Marva Barber MD   10 mg at 10/17/22 3382    [Held by provider] metoprolol tartrate (LOPRESSOR) tablet 25 mg  25 mg Oral TID Marva Barber MD   25 mg at 10/11/22 1707    [Held by provider] lisinopriL (PRINIVIL, ZESTRIL) tablet 5 mg  5 mg Oral DAILY Maicol Dallas MD        albuterol-ipratropium (DUO-NEB) 2.5 MG-0.5 MG/3 ML  3 mL Nebulization Q4H RT Neil Dallas MD   3 mL at 10/17/22 0802    acetaminophen (TYLENOL) tablet 650 mg  650 mg Oral Q6H PRN Barbara Boas E, DO        midodrine (PROAMATINE) tablet 10 mg  10 mg Oral TID Barbarabie Boas E, DO   10 mg at 10/17/22 0823    nitroglycerin (NITROBID) 2 % ointment 1 Inch  1 Inch Topical Q6H PRN Nico Nunez, DO        albuterol-ipratropium (DUO-NEB) 2.5 MG-0.5 MG/3 ML  3 mL Nebulization Q6H PRN Javier Nunez DO         Facility-Administered Medications Ordered in Other Encounters   Medication Dose Route Frequency Provider Last Rate Last Admin    etomidate (AMIDATE) 2 mg/mL injection   IntraVENous PRN Jimenez Mehta CRNA   10 mg at 10/12/22 0738    succinylcholine (ANECTINE) 20 mg/mL syringe   IntraVENous PRN Jimenez Mehta CRNA   80 mg at 10/12/22 0738       Objective  Vitals:    10/17/22 0645 10/17/22 0700 10/17/22 0715 10/17/22 0809   BP: (!) 100/47 (!) 102/54 (!) 100/53    Pulse: (!) 104 (!) 107 99 (!) 114   Resp: 23 25 25 20   Temp:       SpO2: 95% 95% 95% 92%   Weight:       Height:             Intake/Output Summary (Last 24 hours) at 10/17/2022 0942  Last data filed at 10/17/2022 0900  Gross per 24 hour   Intake 3253.22 ml   Output 3266 ml   Net -12.78 ml           Admission weight: Weight: 59 kg (130 lb) (10/10/22 1734)  Last Weight Metrics:  Weight Loss Metrics 10/16/2022 9/29/2022 7/21/2022 6/5/2022 3/12/2022 2/14/2022 1/31/2022   Today's Wt 124 lb 9 oz 111 lb 15.9 oz 112 lb 1.6 oz 139 lb 8 oz 140 lb 140 lb 140 lb   BMI 22.06 kg/m2 19.84 kg/m2 19.86 kg/m2 25.51 kg/m2 25.61 kg/m2 24.03 kg/m2 24.03 kg/m2             Physical Assessment:     General: intubated, sedated. Eyes are open. ET in place. Neck: No jvd. LUNGS: diminished air entry, bl exp rhonchi. CVS EXM: S1, S2  RRR. Abdomen: soft, non tender. Lower Extremities:  1+ edema. Rt fem temporary dialysis catheter.        Lab    CBC w/Diff Recent Labs     10/17/22  0400 10/16/22  0605 10/15/22  1815 10/15/22  0204   WBC 13.9* 13.1  --  13.7*   RBC 2.31* 2.46*  --  2.26*   HGB 7.2* 7.7* 6.3* 7.3*   HCT 21.5* 23.3* 19.4* 23.5*   PLT 41* 55*  --  106*   GRANS 90* 91*  --  90*   LYMPH 3* 3*  --  4*   EOS 0 0  --  0        Chemistry Recent Labs     10/17/22  0800 10/17/22  0400 10/17/22  0010 10/16/22  1024 10/16/22  0605 10/15/22  1815 10/15/22  1048   * 140* 151*   < > 159*   < > 174*   * 134* 135*   < > 135*   < > 135*   K 4.3 4.5 4.5   < > 4.8   < > 4.7    103 103   < > 104   < > 104   CO2 24 26 24   < > 22   < > 23   BUN 19* 19* 19*   < > 16   < > 20*   CREA 1.16 1.18 1.23   < > 1.25   < > 1.35*   CA 8.0* 8.4* 8.4*   < > 8.2*   < > 8.1*   AGAP 6 5 8   < > 9   < > 8   BUCR 16 16 15   < > 13   < > 15   AP  --  306*  --   --  306*  --  134*   TP  --  5.8*  --   --  6.1*  --  6.1*   ALB 2.9* 3.0* 3.0*   < > 3.2*   < > 2.8*   GLOB  --  2.8  --   --  2.9  --  3.3   AGRAT  --  1.1  --   --  1.1  --  0.8   PHOS 2.3* 1.9* 1.7*   < > 1.8*   < > 2.6    < > = values in this interval not displayed. No results found for: IRON, FE, TIBC, IBCT, PSAT, FERR   Lab Results   Component Value Date/Time    Calcium 8.0 (L) 10/17/2022 08:00 AM    Phosphorus 2.3 (L) 10/17/2022 08:00 AM        Mo Greco M.D.   [facility]  Phone

## 2022-10-18 PROBLEM — N17.9 ENCOUNTER FOR CONTINUOUS RENAL REPLACEMENT THERAPY (CRRT) FOR ACUTE RENAL FAILURE (HCC): Status: ACTIVE | Noted: 2022-01-01

## 2022-10-18 PROBLEM — I48.0 PAROXYSMAL ATRIAL FIBRILLATION (HCC): Status: ACTIVE | Noted: 2022-01-01

## 2022-10-18 PROBLEM — R57.0 CARDIOGENIC SHOCK (HCC): Status: ACTIVE | Noted: 2022-01-01

## 2022-10-18 PROBLEM — D64.89 OTHER SPECIFIED ANEMIAS: Status: ACTIVE | Noted: 2022-01-01

## 2022-10-18 PROBLEM — E87.20 LACTIC ACIDOSIS: Status: ACTIVE | Noted: 2022-01-01

## 2022-10-18 PROBLEM — I50.1 PULMONARY EDEMA CARDIAC CAUSE (HCC): Status: ACTIVE | Noted: 2022-01-01

## 2022-10-18 NOTE — PROGRESS NOTES
responded to a death in room 304 this morning along with the patient 's family and . Prayer was offered and support given. Family to call us back with  home information. Card given for this purpose.   Sid Terrell   Board Certified [address] [place] [address]   [phone]

## 2022-10-18 NOTE — DEATH NOTE
Death Pronouncement Note    Patient lying in bed, mouth open, eyes closed. Pupils fixed and equal bilaterally. No response to verbal or painful stimuli. No heart or lung sounds auscultated. No carotid or peripheral pulses. Death officially pronounced at 08:21, 10/18/2022    Medical Examiner notified: No, does not meet criteria    Family Notified: YES - Family at bedside at time of death.      Alberto Bullock PA-C  10/18/22    Pulmonary Critical Care Medicine  [facility]

## 2022-10-18 NOTE — DISCHARGE SUMMARY
Death Discharge Summary    Patient: Ryanne Chaudhry               Sex: female          DOA: 10/10/2022         YOB: 1940      Age:  78 y.o.        LOS:  LOS: 8 days                Admit Date: 10/10/2022    Discharge Date: 10/18/2022    Admission Diagnoses: Pneumonia [J18.9]    Final Discharge Diagnoses:    Problem List as of 10/18/2022 Date Reviewed: 9/29/2022            Codes Class Noted - Resolved    Cardiogenic shock ([facility] ICD-10-CM: R57.0  ICD-9-CM: 785.51  10/12/2022 - Present        Encounter for continuous renal replacement therapy (CRRT) for acute renal failure ([facility] ICD-10-CM: N17.9  ICD-9-CM: V56.0, 584.9  10/12/2022 - Present        Pulmonary edema cardiac cause ([facility] ICD-10-CM: I50.1  ICD-9-CM: 514  10/12/2022 - Present        Lactic acidosis ICD-10-CM: E87.20  ICD-9-CM: 276.2  10/12/2022 - Present        Other specified anemias ICD-10-CM: D64.89  ICD-9-CM: 285.8  10/12/2022 - Present        UTI (urinary tract infection) ICD-10-CM: N39.0  ICD-9-CM: 599.0  10/11/2022 - Present        Pneumonia ICD-10-CM: J18.9  ICD-9-CM: 382  10/10/2022 - Present        Paroxysmal atrial fibrillation ([facility] ICD-10-CM: I48.0  ICD-9-CM: 427.31  10/10/2022 - Present        Carotid artery disease ([facility] ICD-10-CM: I77.9  ICD-9-CM: 447.9  9/29/2022 - Present        Fluid overload ICD-10-CM: E87.70  ICD-9-CM: 276.69  7/18/2022 - Present        Acute on chronic systolic and diastolic heart failure, NYHA class 4 ([facility] ICD-10-CM: I50.43  ICD-9-CM: 428.43  7/17/2022 - Present        CHF (congestive heart failure) ([facility]) ICD-10-CM: I50.9  ICD-9-CM: 428.0  6/2/2022 - Present           Hospital Course:  Pt is a 79 y/o F with PMH of CHF, A. fib, HTN, CAD, CVA who presents to [facility] ED on 10/11 with complaints of \"not feeling well\" and SOB with some chest discomfort and reported LE swelling. Upon ED evaluation, patient was found to be very hypoxic, thought to be from CHF exacerbation versus CAP.   Patient was started on ABX [facility] Regarding her respiratory status, she eventually decompensated requiring HFNC and then BiPAP. CXR showed extensive infiltrates due to pulmonary edema for which patient was diuresed with IV and p.o. Lasix and then with Bumex. RRT was ultimately called on 10/12 due to severe hypoxia and tachypnea, which required emergent intubation and upgraded status to ICU. Regarding her hemodynamics, she was initially started on Cardizem and BB for A. fib RVR which were subsequently stopped due to development of cardiogenic shock requiring vasopressor support. Course further complicated with anemia requiring blood transfusion without visible GI bleed. Additionally, patient developed ELY likely due to ATN vs cardiorenal syndrome for which Nephrology was consulted. She ultimately was placed on CRRT which was then stopped on 10/18 due to worsening overall hemodynamics. At this point, patient was on multiple vasopressors for likely combination of septic shock and cardiogenic shock. Milrinone had also been added for additional inotropic support. Patient also had acute encephalopathy likely toxic metabolic in nature. Unfortunately, patient's overall status status continued to decompensate despite maximal dose of vasopressors to include epinephrine gtt., vasopressin at shock dose, Levophed gtt. and milrinone gtt. On the morning of 10/19, patient's blood pressure continued to worsen with SBP now in the 30s on A-line with good pleth. Family was at bedside and updated with patient's impending cardiac arrest.  They are advised that patient would not survive this hospitalization given her current critical status. Ultimately, patient went into asystolic cardiac arrest and family opted to not pursue further resuscitative efforts. Patient was made a DNR and time of death was called at 8:21 AM on 10/18/22.       Significant Diagnostic Studies:   CT CHEST ABD PELV W CONT [10/14/22]:  CHEST FINDINGS:  Lack of intravenous contrast renders this study suboptimal for evaluating  mediastinal structures. Endotracheal tube in place, tip terminating in the mid thoracic trachea. Nasogastric tube in place, tip terminating in the lateral body of the  nondistended stomach. Right jugular central line in place, tip to the distal SVC. Heart size top normal. There is a tiny pericardial effusion diffusely versus  smooth pericardial thickening. Diffuse bilateral coronary artery calcifications, left greater than right. No enlarged axillary lymph nodes. Borderline enlarged left anterior mediastinal  lymph nodes and precarinal nodes. Lung windows demonstrate no pneumothorax. Geographic areas of subsegmental  groundglass attenuation type changes present in the bilateral upper lobes and  more extensively in the lingular segment. More extensive groundglass to partially confluent airspace opacities involving  all segments of the bilateral lower lobes with central air bronchograms. Findings suggest acute infectious or inflammatory process. Trace left and small right pleural effusions. .     ABDOMEN FINDINGS:   Lack of intravenous contrast renders this study suboptimal for evaluating the  vasculature and solid abdominal organs. Liver is nonenlarged. Several scattered calcification/granuloma in the right  lobe. Possible 8mm hypodensity in the dome of the right lobe, largely obscured  by streak artifact from spine. Gallbladder is borderline distended measuring up to 4.7 cm diameter. No  intraluminal stones are identified. Possible mild amount of adjacent stranding  in the pericholecystic fat. Pancreas severely motion degraded and not well assessed at all. Spleen nonenlarged. Mild thickening of the body of the left adrenal gland up to 12 mm. Right  unremarkable. Kidneys appear globally at least mildly atrophic. No hydronephrosis.   Nonobstructing 4 mm lateral inferior right parenchymal calcification.  -Cannot exclude 1.8 cm low-attenuation lesion anterior mid left renal cortex on  image 21 series 3. There appears to be some adjacent scarring. Could simply be  normal parenchyma but mass not excluded given attenuation is not cystic. Abdominal aorta nonaneurysmal. Diffuse atherosclerotic wall calcification, to  involve the origin of the visceral vessels and renal arteries. Right femoral central line in place, extending to the infrarenal IVC. PELVIS FINDINGS:   Valverde catheter in place decompressing the urinary bladder. No small bowel distention to suggest obstruction. Mildly distended descending colon with mixture of fecal material and fluid. Sigmoid diverticulosis. No definite acute inflammatory change. Cannot well  assess for colitis or ischemia or similar due to lack of contrast and  distention. Uterus not enlarged. Mild diffuse mesenteric edema. No overt ascites. Diffuse subcutaneous edema. Treated compression deformities of L2 and T8-T12. Mild superior endplate  compression deformity L1 and minimally L4 age indeterminate. At least mild  compression deformity T4 age indeterminate. IMPRESSION  1. Bilateral lower lobe multi segmental groundglass to partially consolidative  airspace opacities. Favor acute infectious or inflammatory process. Not densely  consolidative such as would be expected for aspiration. 2. Patchy groundglass airspace opacities bilateral upper lobes could reflect  component of edema. Trace left and small right effusions. 3. Very small diffuse pericardial effusion versus smooth pleural thickening. 4. Bilateral renal atrophy. No hydronephrosis. Nonobstructing small right renal  calcification.  -Suspect lobulation anterior left kidney versus less likely mass. Would consider  nonemergent dedicated renal ultrasound when clinically able for clarification. 5. Borderline gallbladder dilation without cholelithiasis. Minimal regional  mesenteric stranding.  Unclear if this could be related to inflammation from  acalculus cholecystitis or could be related to the underlying body wall edema. Could correlate with ultrasound. 6. Sigmoid diverticulosis without any convincing evidence for acute  inflammation. No small bowel distention. 7. Mesenteric edema. Diffuse subcutaneous edema. 8. Multiple lumbar and thoracic compression deformities, several interval  treated with vertebroplasty. Several are not and are age indeterminant. CT HEAD WO CONT [10/10/22]: FINDINGS:     There are no intra nor extra axial fluid collections. There is no hemorrhage. Periventricular and subcortical white matter hypodensities. Chronic small  watershed zone infarct at the left posterior frontal anterior parietal  junctional zone. Chronic lacunar infarct in the left basal ganglia and  subinsular cortex. The paranasal sinuses which are included on this exam are  well aerated and unremarkable. IMPRESSION     No acute finding. Atrophy. Chronic left watershed and lacunar infarcts    ECHO [10/12/22]: Left Ventricle: Severely reduced left ventricular systolic function with a visually estimated EF of 20 - 25%. Left ventricle size is normal. Mildly increased wall thickness. Severe global hypokinesis present. Right Ventricle: Reduced systolic function. Aortic Valve: Mild regurgitation. Mitral Valve: Mild regurgitation. Left Atrium: Left atrium is dilated. Right Atrium: Right atrium is severely dilated. Pulmonary Arteries: Moderate pulmonary hypertension present. The estimated PASP is 53 mmHg        Cause of death: Biventricular Heart Failure leading to Cardiogenic Shock  Immediate Cause: CHF Exacerbation with volume overload   Underlying Causes, contributors to death: Cardio-renal syndrome; CAP  Other significant conditions contributing to death: COPD, Afib RVR,     Was the ME contacted? No, does not meet criteria  Was the family notified: Yes.  Family at bedside at time of death  Name of notified: Son Rosina Duane, and daughter - Gladis Cuba  10/18/22    Pulmonary [address] [facility]

## 2022-10-18 NOTE — PROGRESS NOTES
Pt with continuing low blood pressure despite max dose on levo, epi, and vaso. CRRT unable to run net even or even just filtration. Team made decision to return blood and stop CRRT. Blood returned and HD cath locked.

## 2022-10-18 NOTE — PROGRESS NOTES
[facility] UPDATE:    ~07:40: Patient's family is currently at bedside. I met family at bedside, including pt's decision makers -  Daughter, Osito Patel and Son, Shaylee Friday, and several other family members, to discuss with them patient's current critical status and high risk for impending cardiac arrest.  Currently, patient's SBP on A-line with good pleth is in the 30s. I again emphasized that patient is critically ill and on several forms of life support at this time. I discussed how patient will not survive despite all aggressive care. Given patient's history of biventricular heart failure and current critical disease, I advised patient become a DNR as she would not survive a cardiac arrest. I advised patient's family that putting patient through CPR and resuscitation efforts would only cause her further suffering and would not change her overall outcome given her degree of heart failure. Family nodded in agreement with this but were unable to make any further decision at this time. Patient's son and daughter stated that they would speak with their  and render a decision regarding DNR status. Plan:   - Continue full aggressive support  - CODE STATUS remains full code  - Blood pressure in the 30s on A-line, impending cardiac arrest imminent      ~08:20: Patient went to the asystolic cardiac arrest on monitor. A-line was left. Family is currently at bedside. Family has decided to not pursue further resuscitation efforts. Patient's status was changed to DNR and patient was pronounced dead at 8:21AM. [place] services were called.      Jodie Daniels PA-C  10/18/22    Pulmonary Critical Care Medicine  [facility]

## 2022-10-18 NOTE — PROGRESS NOTES
Patient decompensating throughout the night. Called nephrology x2 to discuss recs for CRRT. Most of the night CRRT at neg 0 with escalating vasopressor requirement- levo, vaso, milrinone, and epinephrine. Patient's blood pressure continued to decline and lactic increasing. Blood returned when max'd on all 4 vasopressors + 200 mcg 1x push of abbey with no changes. 2 amps bicarb given for mixed resp/metabolic acidosis. No change in blood pressure. Patient unfortunately MOF with impending cardiac arrest.     Son- Callie Leone updated on impending cardiac arrest. Code status addressed, states he needs to talk to sisters.      Miguel Stevenson PA-C  10/18/22  Pulmonary, Critical Care Medicine  [facility]

## 2022-10-20 LAB — 1,3 BETA GLUCAN SER-MCNC: 106 PG/ML

## 2022-10-21 LAB
BACTERIA SPEC CULT: NORMAL
BACTERIA SPEC CULT: NORMAL
SERVICE CMNT-IMP: NORMAL
SERVICE CMNT-IMP: NORMAL

## 2023-03-29 DIAGNOSIS — I77.9 CAROTID ARTERY DISEASE, UNSPECIFIED LATERALITY, UNSPECIFIED TYPE (HCC): ICD-10-CM
